# Patient Record
Sex: MALE | Race: WHITE | Employment: OTHER | URBAN - METROPOLITAN AREA
[De-identification: names, ages, dates, MRNs, and addresses within clinical notes are randomized per-mention and may not be internally consistent; named-entity substitution may affect disease eponyms.]

---

## 2017-11-01 ENCOUNTER — HOSPITAL ENCOUNTER (OUTPATIENT)
Dept: OTHER | Age: 77
Discharge: HOME | End: 2017-11-30
Attending: INTERNAL MEDICINE | Admitting: INTERNAL MEDICINE

## 2017-11-15 PROBLEM — I50.20 SYSTOLIC CHF (HCC): Status: ACTIVE | Noted: 2017-11-15

## 2017-11-15 PROBLEM — E11.9 DM2 (DIABETES MELLITUS, TYPE 2) (HCC): Status: ACTIVE | Noted: 2017-11-15

## 2017-12-01 ENCOUNTER — HOSPITAL ENCOUNTER (OUTPATIENT)
Dept: OTHER | Age: 77
Discharge: HOME | End: 2017-12-31
Attending: INTERNAL MEDICINE | Admitting: INTERNAL MEDICINE

## 2017-12-01 ENCOUNTER — HOSPITAL ENCOUNTER (OUTPATIENT)
Dept: OTHER | Age: 77
Discharge: HOME | End: 2017-12-04
Attending: INTERNAL MEDICINE | Admitting: INTERNAL MEDICINE

## 2017-12-06 ENCOUNTER — OFFICE VISIT (OUTPATIENT)
Dept: CARDIOLOGY CLINIC | Age: 77
End: 2017-12-06

## 2017-12-06 VITALS
HEART RATE: 72 BPM | SYSTOLIC BLOOD PRESSURE: 120 MMHG | DIASTOLIC BLOOD PRESSURE: 72 MMHG | BODY MASS INDEX: 32.08 KG/M2 | WEIGHT: 250 LBS | HEIGHT: 74 IN | OXYGEN SATURATION: 94 %

## 2017-12-06 DIAGNOSIS — I10 ESSENTIAL HYPERTENSION: ICD-10-CM

## 2017-12-06 DIAGNOSIS — I25.118 CORONARY ARTERY DISEASE OF NATIVE ARTERY OF NATIVE HEART WITH STABLE ANGINA PECTORIS (HCC): Primary | ICD-10-CM

## 2017-12-06 DIAGNOSIS — I25.5 CARDIOMYOPATHY, ISCHEMIC: ICD-10-CM

## 2017-12-06 DIAGNOSIS — I50.22 CHRONIC SYSTOLIC CONGESTIVE HEART FAILURE (HCC): ICD-10-CM

## 2017-12-06 PROCEDURE — 1111F DSCHRG MED/CURRENT MED MERGE: CPT | Performed by: NURSE PRACTITIONER

## 2017-12-06 PROCEDURE — G8598 ASA/ANTIPLAT THER USED: HCPCS | Performed by: NURSE PRACTITIONER

## 2017-12-06 PROCEDURE — G8484 FLU IMMUNIZE NO ADMIN: HCPCS | Performed by: NURSE PRACTITIONER

## 2017-12-06 PROCEDURE — 4040F PNEUMOC VAC/ADMIN/RCVD: CPT | Performed by: NURSE PRACTITIONER

## 2017-12-06 PROCEDURE — 1036F TOBACCO NON-USER: CPT | Performed by: NURSE PRACTITIONER

## 2017-12-06 PROCEDURE — G8417 CALC BMI ABV UP PARAM F/U: HCPCS | Performed by: NURSE PRACTITIONER

## 2017-12-06 PROCEDURE — 3017F COLORECTAL CA SCREEN DOC REV: CPT | Performed by: NURSE PRACTITIONER

## 2017-12-06 PROCEDURE — 1123F ACP DISCUSS/DSCN MKR DOCD: CPT | Performed by: NURSE PRACTITIONER

## 2017-12-06 PROCEDURE — G8427 DOCREV CUR MEDS BY ELIG CLIN: HCPCS | Performed by: NURSE PRACTITIONER

## 2017-12-06 PROCEDURE — 99213 OFFICE O/P EST LOW 20 MIN: CPT | Performed by: NURSE PRACTITIONER

## 2017-12-06 RX ORDER — ATORVASTATIN CALCIUM 40 MG/1
40 TABLET, FILM COATED ORAL DAILY
Qty: 90 TABLET | Refills: 0 | Status: CANCELLED | OUTPATIENT
Start: 2017-12-06

## 2017-12-06 NOTE — PATIENT INSTRUCTIONS
1. No change in heart medicines  2. Follow up with Micha and the echocardiogram later this month  3. Follow up with Dr. Compa Vick in January  4. Consider a lower dose of Ranexa if have more chest pain instead of increasing amlodipine  5.  Okay to resume cardiac rehab phase III

## 2017-12-06 NOTE — PROGRESS NOTES
Matt Jacob   Cardiac Evaluation    Primary Care Doctor:  Kaleb Leslie MD    Chief Complaint   Patient presents with    Follow-Up from Hospital     chronic angina 11/15-11/16/2017, ekg 11/17/2017    Congestive Heart Failure    Results     cxr 11/15/2017    Discuss Labs     bmp, bnp & cbc 11/15/2017, lipids 07/03/2016    Fatigue     depending on the day        History of Present Illness:   I had the pleasure of seeing Stephon Doss in follow up for recent hospitalization. Stephon Doss presented with chest pain that was felt to be his chronic stable angina. Troponin's were mildly elevated but no rise or fall consistent with ACS. His symptoms improved with addition of calcium channel blocker for angina. He has a known hx of CAD with multiple prior interventions, followed by Dr. Cortez Harrington, as well as cardiomyopathy (ischemic vs hypertension). He remains on ARB, EB BB, ASA, Plavix, statin. Today he reports significant improvement in angina with the amlodipine. He also feels this may be on the \"brink\" of needing more. He wants to return to cardiac rehab but phase 3 maintenance plan. He admits to not being very active since hospitalization. He denies any shortness of breath, no orthopnea or PND. Appetite is good though is overall decreased over past year. His weight is down about 20 lbs in past several months. His insurance sent him a scale for telemonitoring. Stephon Doss describes symptoms including fatigue but denies chest pain, dyspnea, palpitations, orthopnea, PND, early saiety, edema, syncope. Past Medical History:   has a past medical history of Acute MI; Cancer ([address] CHF (congestive heart failure) ([address] Diabetes mellitus ([address] GERD (gastroesophageal reflux disease); Hyperlipidemia; Hypertension; and Neuropathy ([address] Surgical History:   has a past surgical history that includes Splenectomy (2002); Cholecystectomy; Appendectomy; Pilonidal cyst excision;  Tunneled venous port placement; Colonoscopy; Colonoscopy (10/7/13); and Coronary angioplasty with stent. Social History:   reports that he quit smoking about 43 years ago. He has never used smokeless tobacco. He reports that he does not drink alcohol or use drugs. Family History:   History reviewed. No pertinent family history. Home Medications:  Prior to Admission medications    Medication Sig Start Date End Date Taking? Authorizing Provider   amLODIPine (NORVASC) 2.5 MG tablet Take 1 tablet by mouth daily 11/16/17  Yes Lucía Bryant MD   furosemide (LASIX) 40 MG tablet Take 40 mg by mouth daily   Yes Historical Provider, MD   omeprazole (PRILOSEC) 20 MG delayed release capsule Take 20 mg by mouth Daily   Yes Historical Provider, MD   isosorbide mononitrate (IMDUR) 60 MG CR tablet Take 1 tablet by mouth daily 7/5/16  Yes Lucía Bryant MD   nitroGLYCERIN (NITROSTAT) 0.4 MG SL tablet Place 1 tablet under the tongue every 5 minutes as needed for Chest pain 7/5/16  Yes Lucía Bryant MD   clopidogrel (PLAVIX) 75 MG tablet Take 1 tablet by mouth daily 7/3/16  Yes Lucía Bryant MD   carvedilol (COREG) 6.25 MG tablet Take 6.25 mg by mouth 2 times daily (with meals). Yes Historical Provider, MD   citalopram (CELEXA) 40 MG tablet Take 40 mg by mouth daily. Yes Historical Provider, MD   pravastatin (PRAVACHOL) 40 MG tablet Take 40 mg by mouth daily. Yes Historical Provider, MD   losartan (COZAAR) 25 MG tablet Take 25 mg by mouth daily. Yes Historical Provider, MD   aspirin 81 MG tablet Take 81 mg by mouth daily    Yes Historical Provider, MD   metFORMIN (GLUCOPHAGE) 1000 MG tablet Take 1,000 mg by mouth 2 times daily (with meals). Yes Historical Provider, MD        Allergies:  Kiwi extract; Adhesive tape; Diclofenac sodium; Lipitor; Lisinopril; and Lorazepam     Review of Systems:   · Constitutional: there has been no unanticipated weight loss.      · Eyes: No vision changes  · ENT: No Headaches, no nasal congestion. No mouth sores or sore throat. · Cardiovascular: Reviewed in HPI  · Respiratory: No cough or wheezing, no sputum production. · Gastrointestinal: No abdominal pain, no constipation or diarrhea  · Genitourinary: No dysuria, trouble voiding, or hematuria. · Musculoskeletal:  No weakness or joint complaints. · Integumentary: No rash or pruritis. · Neurological: No numbness or tingling. No weakness. No tremor. · Psychiatric: No anxiety, no depression. · Endocrine:  No excessive thirst or urination. · Hematologic/Lymphatic: No abnormal bruising or bleeding, blood clots or swollen lymph nodes. Physical Examination:    Vitals:    12/06/17 1509   BP: 120/72   Pulse: 72   SpO2: 94%   Weight: 250 lb (113.4 kg)   Height: 6' 2\" (1.88 m)        Constitutional and General Appearance: Warm and dry, no apparent distress, normal coloration  HEENT:  Normocephalic, atraumatic  Respiratory:  · Normal excursion and expansion without use of accessory muscles  · Resp Auscultation: Normal breath sounds without dullness  Cardiovascular:  · The apical impulses not displaced  · Heart tones are crisp and normal  · JVP 8 cm H2O  · Regular rate and rhythm, normal S1S2, no m/g/r/c  · Peripheral pulses are symmetrical and full  · There is no clubbing, cyanosis of the extremities.   · No edema  · Pedal Pulses: 2+ and equal     Abdomen:  · No masses or tenderness  · Liver/Spleen: No Abnormalities Noted  Neurological/Psychiatric:  · Alert and oriented in all spheres  · Moves all extremities well  · Exhibits normal gait balance and coordination  · No abnormalities of mood, affect, memory, mentation, or behavior are noted    Lab Data:  CBC:   Lab Results   Component Value Date    WBC 11.1 11/15/2017    WBC 12.7 07/05/2016    WBC 10.0 07/03/2016    RBC 4.31 11/15/2017    RBC 4.30 07/05/2016    RBC 4.29 07/03/2016    HGB 13.0 11/15/2017    HGB 12.8 07/05/2016    HGB 12.7 07/03/2016    HCT 39.6 11/15/2017    HCT 39.4 07/05/2016

## 2017-12-06 NOTE — LETTER
[address]  [address] [facility] [address]  Phone: [phone]  Fax: [phone]    Jose Mcnair NP        December 6, 2017     Patient: Jovan Alvarez   YOB: 1940   Date of Visit: 12/6/2017       To Whom It May Concern: It is my medical opinion that Rebecca Beltrán may return to participation on 12/11/17 to phase III cardiac rehabilitation. 1. Coronary artery disease of native artery of native heart with stable angina pectoris ([address]    2. Chronic systolic congestive heart failure (HCC)    3. Cardiomyopathy, ischemic    4. Essential hypertension         If you have any questions or concerns, please don't hesitate to call.     Sincerely,        Jose Mcnair NP

## 2018-01-01 ENCOUNTER — HOSPITAL ENCOUNTER (OUTPATIENT)
Dept: OTHER | Age: 78
Discharge: HOME | End: 2018-01-31
Attending: INTERNAL MEDICINE | Admitting: INTERNAL MEDICINE

## 2018-02-01 ENCOUNTER — HOSPITAL ENCOUNTER (OUTPATIENT)
Dept: OTHER | Age: 78
Discharge: HOME | End: 2018-02-28
Attending: INTERNAL MEDICINE | Admitting: INTERNAL MEDICINE

## 2018-03-01 ENCOUNTER — HOSPITAL ENCOUNTER (OUTPATIENT)
Dept: OTHER | Age: 78
Discharge: HOME | End: 2018-03-31
Attending: INTERNAL MEDICINE | Admitting: INTERNAL MEDICINE

## 2018-04-01 ENCOUNTER — HOSPITAL ENCOUNTER (OUTPATIENT)
Dept: OTHER | Age: 78
Discharge: HOME | End: 2018-04-30
Attending: INTERNAL MEDICINE | Admitting: INTERNAL MEDICINE

## 2018-05-01 ENCOUNTER — HOSPITAL ENCOUNTER (OUTPATIENT)
Dept: OTHER | Age: 78
Discharge: HOME | End: 2018-05-31
Attending: INTERNAL MEDICINE | Admitting: INTERNAL MEDICINE

## 2018-06-01 ENCOUNTER — HOSPITAL ENCOUNTER (OUTPATIENT)
Dept: OTHER | Age: 78
Discharge: HOME | End: 2018-06-30
Attending: INTERNAL MEDICINE | Admitting: INTERNAL MEDICINE

## 2018-07-01 ENCOUNTER — HOSPITAL ENCOUNTER (OUTPATIENT)
Dept: OTHER | Age: 78
Discharge: HOME | End: 2018-07-01
Attending: INTERNAL MEDICINE | Admitting: INTERNAL MEDICINE

## 2018-08-03 ENCOUNTER — HOSPITAL ENCOUNTER (OUTPATIENT)
Age: 78
Discharge: HOME | End: 2018-08-03

## 2018-09-10 ENCOUNTER — HOSPITAL ENCOUNTER (OUTPATIENT)
Age: 78
Discharge: HOME | End: 2018-09-10
Payer: MEDICARE

## 2018-09-10 PROCEDURE — 9900000038 HC CARDIAC REHAB PHASE 3 - MONTHLY

## 2018-10-01 ENCOUNTER — HOSPITAL ENCOUNTER (OUTPATIENT)
Age: 78
Discharge: HOME | End: 2018-10-01
Payer: MEDICARE

## 2018-10-01 PROCEDURE — 9900000038 HC CARDIAC REHAB PHASE 3 - MONTHLY

## 2018-11-05 ENCOUNTER — HOSPITAL ENCOUNTER (OUTPATIENT)
Age: 78
Setting detail: THERAPIES SERIES
Discharge: HOME | End: 2018-11-05

## 2018-11-05 PROCEDURE — 9900000038 HC CARDIAC REHAB PHASE 3 - MONTHLY

## 2018-12-03 ENCOUNTER — HOSPITAL ENCOUNTER (OUTPATIENT)
Age: 78
Discharge: HOME | End: 2018-12-03
Payer: MEDICARE

## 2018-12-03 PROCEDURE — 9900000038 HC CARDIAC REHAB PHASE 3 - MONTHLY

## 2019-01-28 ENCOUNTER — TELEPHONE (OUTPATIENT)
Dept: PULMONOLOGY | Age: 79
End: 2019-01-28

## 2019-01-28 NOTE — TELEPHONE ENCOUNTER
Pt's wife states that these locations are too far from their home. They are going to check with his primary provider to see if there is somewhere closer to their home.

## 2019-02-15 ENCOUNTER — HOSPITAL ENCOUNTER (OUTPATIENT)
Age: 79
Discharge: HOME | End: 2019-02-15
Payer: MEDICARE

## 2019-02-15 PROCEDURE — 9900000038 HC CARDIAC REHAB PHASE 3 - MONTHLY

## 2019-03-13 ENCOUNTER — HOSPITAL ENCOUNTER (OUTPATIENT)
Age: 79
Discharge: HOME | End: 2019-03-13
Payer: MEDICARE

## 2019-03-13 PROCEDURE — 9900000038 HC CARDIAC REHAB PHASE 3 - MONTHLY

## 2019-07-01 PROCEDURE — 9900000038 HC CARDIAC REHAB PHASE 3 - MONTHLY

## 2019-07-29 ENCOUNTER — HOSPITAL ENCOUNTER (OUTPATIENT)
Dept: CARDIAC REHAB | Age: 79
Discharge: HOME | End: 2019-07-29

## 2019-08-07 ENCOUNTER — ANESTHESIA EVENT (OUTPATIENT)
Dept: OPERATING ROOM | Age: 79
End: 2019-08-07
Payer: MEDICARE

## 2019-08-07 ASSESSMENT — LIFESTYLE VARIABLES: SMOKING_STATUS: 0

## 2019-08-08 ENCOUNTER — ANESTHESIA (OUTPATIENT)
Dept: OPERATING ROOM | Age: 79
End: 2019-08-08
Payer: MEDICARE

## 2019-08-08 ENCOUNTER — HOSPITAL ENCOUNTER (OUTPATIENT)
Age: 79
Setting detail: OUTPATIENT SURGERY
Discharge: HOME | End: 2019-08-08
Attending: OPHTHALMOLOGY | Admitting: OPHTHALMOLOGY
Payer: MEDICARE

## 2019-08-08 VITALS — SYSTOLIC BLOOD PRESSURE: 106 MMHG | OXYGEN SATURATION: 99 % | DIASTOLIC BLOOD PRESSURE: 65 MMHG

## 2019-08-08 VITALS
HEIGHT: 74 IN | HEART RATE: 71 BPM | DIASTOLIC BLOOD PRESSURE: 83 MMHG | OXYGEN SATURATION: 99 % | BODY MASS INDEX: 32.08 KG/M2 | WEIGHT: 250 LBS | TEMPERATURE: 97.1 F | SYSTOLIC BLOOD PRESSURE: 143 MMHG | RESPIRATION RATE: 17 BRPM

## 2019-08-08 LAB
EKG ATRIAL RATE: 65 BPM
EKG DIAGNOSIS: NORMAL
EKG P AXIS: 90 DEGREES
EKG P-R INTERVAL: 176 MS
EKG Q-T INTERVAL: 504 MS
EKG QRS DURATION: 150 MS
EKG QTC CALCULATION (BAZETT): 524 MS
EKG R AXIS: 258 DEGREES
EKG T AXIS: 79 DEGREES
EKG VENTRICULAR RATE: 65 BPM
GLUCOSE BLD-MCNC: 116 MG/DL (ref 70–99)
GLUCOSE BLD-MCNC: 126 MG/DL (ref 70–99)
PERFORMED ON: ABNORMAL
PERFORMED ON: ABNORMAL

## 2019-08-08 PROCEDURE — 6370000000 HC RX 637 (ALT 250 FOR IP): Performed by: OPHTHALMOLOGY

## 2019-08-08 PROCEDURE — 3700000001 HC ADD 15 MINUTES (ANESTHESIA): Performed by: OPHTHALMOLOGY

## 2019-08-08 PROCEDURE — 2500000003 HC RX 250 WO HCPCS: Performed by: ANESTHESIOLOGY

## 2019-08-08 PROCEDURE — 93005 ELECTROCARDIOGRAM TRACING: CPT | Performed by: ANESTHESIOLOGY

## 2019-08-08 PROCEDURE — 2500000003 HC RX 250 WO HCPCS: Performed by: OPHTHALMOLOGY

## 2019-08-08 PROCEDURE — 6360000002 HC RX W HCPCS: Performed by: NURSE ANESTHETIST, CERTIFIED REGISTERED

## 2019-08-08 PROCEDURE — 93010 ELECTROCARDIOGRAM REPORT: CPT | Performed by: INTERNAL MEDICINE

## 2019-08-08 PROCEDURE — 7100000001 HC PACU RECOVERY - ADDTL 15 MIN: Performed by: OPHTHALMOLOGY

## 2019-08-08 PROCEDURE — 2709999900 HC NON-CHARGEABLE SUPPLY: Performed by: OPHTHALMOLOGY

## 2019-08-08 PROCEDURE — 2500000003 HC RX 250 WO HCPCS: Performed by: NURSE ANESTHETIST, CERTIFIED REGISTERED

## 2019-08-08 PROCEDURE — 3700000000 HC ANESTHESIA ATTENDED CARE: Performed by: OPHTHALMOLOGY

## 2019-08-08 PROCEDURE — 2580000003 HC RX 258: Performed by: ANESTHESIOLOGY

## 2019-08-08 PROCEDURE — 7100000011 HC PHASE II RECOVERY - ADDTL 15 MIN: Performed by: OPHTHALMOLOGY

## 2019-08-08 PROCEDURE — 3600000013 HC SURGERY LEVEL 3 ADDTL 15MIN: Performed by: OPHTHALMOLOGY

## 2019-08-08 PROCEDURE — V2632 POST CHMBR INTRAOCULAR LENS: HCPCS | Performed by: OPHTHALMOLOGY

## 2019-08-08 PROCEDURE — 3600000003 HC SURGERY LEVEL 3 BASE: Performed by: OPHTHALMOLOGY

## 2019-08-08 PROCEDURE — 7100000000 HC PACU RECOVERY - FIRST 15 MIN: Performed by: OPHTHALMOLOGY

## 2019-08-08 PROCEDURE — 6360000002 HC RX W HCPCS: Performed by: OPHTHALMOLOGY

## 2019-08-08 PROCEDURE — 7100000010 HC PHASE II RECOVERY - FIRST 15 MIN: Performed by: OPHTHALMOLOGY

## 2019-08-08 DEVICE — ACRYSOF(R) SINGLE-PIECE ACRYLIC FOLDABLE IOL,UV-ABSORBING POSTERIOR CHAMBER LENS (IOL/PC),13.0MM LENGTH, 6.0MM BICONVEX OPTIC, PLANARHAPTICS.
Type: IMPLANTABLE DEVICE | Site: EYE | Status: FUNCTIONAL
Brand: ACRYSOF®

## 2019-08-08 RX ORDER — MORPHINE SULFATE 10 MG/ML
1 INJECTION, SOLUTION INTRAMUSCULAR; INTRAVENOUS EVERY 5 MIN PRN
Status: DISCONTINUED | OUTPATIENT
Start: 2019-08-08 | End: 2019-08-08 | Stop reason: HOSPADM

## 2019-08-08 RX ORDER — OXYCODONE HYDROCHLORIDE AND ACETAMINOPHEN 5; 325 MG/1; MG/1
2 TABLET ORAL PRN
Status: DISCONTINUED | OUTPATIENT
Start: 2019-08-08 | End: 2019-08-08 | Stop reason: HOSPADM

## 2019-08-08 RX ORDER — SODIUM CHLORIDE 0.9 % (FLUSH) 0.9 %
10 SYRINGE (ML) INJECTION PRN
Status: DISCONTINUED | OUTPATIENT
Start: 2019-08-08 | End: 2019-08-08 | Stop reason: HOSPADM

## 2019-08-08 RX ORDER — OXYCODONE HYDROCHLORIDE AND ACETAMINOPHEN 5; 325 MG/1; MG/1
1 TABLET ORAL PRN
Status: DISCONTINUED | OUTPATIENT
Start: 2019-08-08 | End: 2019-08-08 | Stop reason: HOSPADM

## 2019-08-08 RX ORDER — PROPOFOL 10 MG/ML
INJECTION, EMULSION INTRAVENOUS PRN
Status: DISCONTINUED | OUTPATIENT
Start: 2019-08-08 | End: 2019-08-08 | Stop reason: SDUPTHER

## 2019-08-08 RX ORDER — SODIUM CHLORIDE, POTASSIUM CHLORIDE, CALCIUM CHLORIDE, MAGNESIUM CHLORIDE, SODIUM ACETATE, AND SODIUM CITRATE 6.4; .75; .48; .3; 3.9; 1.7 MG/ML; MG/ML; MG/ML; MG/ML; MG/ML; MG/ML
SOLUTION IRRIGATION PRN
Status: DISCONTINUED | OUTPATIENT
Start: 2019-08-08 | End: 2019-08-08 | Stop reason: ALTCHOICE

## 2019-08-08 RX ORDER — FENTANYL CITRATE 50 UG/ML
50 INJECTION, SOLUTION INTRAMUSCULAR; INTRAVENOUS EVERY 5 MIN PRN
Status: DISCONTINUED | OUTPATIENT
Start: 2019-08-08 | End: 2019-08-08 | Stop reason: HOSPADM

## 2019-08-08 RX ORDER — PREDNISOLONE ACETATE 10 MG/ML
SUSPENSION/ DROPS OPHTHALMIC PRN
Status: DISCONTINUED | OUTPATIENT
Start: 2019-08-08 | End: 2019-08-08 | Stop reason: ALTCHOICE

## 2019-08-08 RX ORDER — SODIUM CHLORIDE, SODIUM LACTATE, POTASSIUM CHLORIDE, CALCIUM CHLORIDE 600; 310; 30; 20 MG/100ML; MG/100ML; MG/100ML; MG/100ML
INJECTION, SOLUTION INTRAVENOUS CONTINUOUS
Status: DISCONTINUED | OUTPATIENT
Start: 2019-08-08 | End: 2019-08-08 | Stop reason: HOSPADM

## 2019-08-08 RX ORDER — ONDANSETRON 2 MG/ML
4 INJECTION INTRAMUSCULAR; INTRAVENOUS
Status: DISCONTINUED | OUTPATIENT
Start: 2019-08-08 | End: 2019-08-08 | Stop reason: HOSPADM

## 2019-08-08 RX ORDER — EPHEDRINE SULFATE 50 MG/ML
INJECTION INTRAVENOUS PRN
Status: DISCONTINUED | OUTPATIENT
Start: 2019-08-08 | End: 2019-08-08 | Stop reason: SDUPTHER

## 2019-08-08 RX ORDER — TOBRAMYCIN AND DEXAMETHASONE 3; 1 MG/ML; MG/ML
SUSPENSION/ DROPS OPHTHALMIC PRN
Status: DISCONTINUED | OUTPATIENT
Start: 2019-08-08 | End: 2019-08-08 | Stop reason: ALTCHOICE

## 2019-08-08 RX ORDER — LIDOCAINE HYDROCHLORIDE 10 MG/ML
0.3 INJECTION, SOLUTION EPIDURAL; INFILTRATION; INTRACAUDAL; PERINEURAL
Status: COMPLETED | OUTPATIENT
Start: 2019-08-08 | End: 2019-08-08

## 2019-08-08 RX ORDER — SODIUM CHLORIDE 0.9 % (FLUSH) 0.9 %
10 SYRINGE (ML) INJECTION EVERY 12 HOURS SCHEDULED
Status: DISCONTINUED | OUTPATIENT
Start: 2019-08-08 | End: 2019-08-08 | Stop reason: HOSPADM

## 2019-08-08 RX ORDER — PREDNISOLONE ACETATE 10 MG/ML
1 SUSPENSION/ DROPS OPHTHALMIC SEE ADMIN INSTRUCTIONS
Status: DISCONTINUED | OUTPATIENT
Start: 2019-08-08 | End: 2019-08-08 | Stop reason: HOSPADM

## 2019-08-08 RX ORDER — FENTANYL CITRATE 50 UG/ML
25 INJECTION, SOLUTION INTRAMUSCULAR; INTRAVENOUS EVERY 5 MIN PRN
Status: DISCONTINUED | OUTPATIENT
Start: 2019-08-08 | End: 2019-08-08 | Stop reason: HOSPADM

## 2019-08-08 RX ORDER — TETRACAINE HYDROCHLORIDE 5 MG/ML
SOLUTION OPHTHALMIC PRN
Status: DISCONTINUED | OUTPATIENT
Start: 2019-08-08 | End: 2019-08-08 | Stop reason: ALTCHOICE

## 2019-08-08 RX ORDER — MORPHINE SULFATE 10 MG/ML
2 INJECTION, SOLUTION INTRAMUSCULAR; INTRAVENOUS EVERY 5 MIN PRN
Status: DISCONTINUED | OUTPATIENT
Start: 2019-08-08 | End: 2019-08-08 | Stop reason: HOSPADM

## 2019-08-08 RX ORDER — PILOCARPINE HYDROCHLORIDE 20 MG/ML
SOLUTION/ DROPS OPHTHALMIC PRN
Status: DISCONTINUED | OUTPATIENT
Start: 2019-08-08 | End: 2019-08-08 | Stop reason: ALTCHOICE

## 2019-08-08 RX ORDER — TOBRAMYCIN AND DEXAMETHASONE 3; 1 MG/ML; MG/ML
1 SUSPENSION/ DROPS OPHTHALMIC SEE ADMIN INSTRUCTIONS
Status: DISCONTINUED | OUTPATIENT
Start: 2019-08-08 | End: 2019-08-08 | Stop reason: HOSPADM

## 2019-08-08 RX ADMIN — Medication 0.3 ML: at 09:41

## 2019-08-08 RX ADMIN — Medication 0.3 ML: at 09:30

## 2019-08-08 RX ADMIN — SODIUM CHLORIDE, POTASSIUM CHLORIDE, SODIUM LACTATE AND CALCIUM CHLORIDE: 600; 310; 30; 20 INJECTION, SOLUTION INTRAVENOUS at 09:46

## 2019-08-08 RX ADMIN — EPHEDRINE SULFATE 15 MG: 50 INJECTION INTRAVENOUS at 10:49

## 2019-08-08 RX ADMIN — Medication 0.3 ML: at 09:45

## 2019-08-08 RX ADMIN — BROMFENAC SODIUM 1 DROP: 0.7 SOLUTION/ DROPS OPHTHALMIC at 09:41

## 2019-08-08 RX ADMIN — LIDOCAINE HYDROCHLORIDE 0.3 ML: 10 INJECTION, SOLUTION EPIDURAL; INFILTRATION; INTRACAUDAL; PERINEURAL at 09:46

## 2019-08-08 RX ADMIN — PROPOFOL 150 MG: 10 INJECTION, EMULSION INTRAVENOUS at 10:30

## 2019-08-08 ASSESSMENT — PULMONARY FUNCTION TESTS
PIF_VALUE: 11
PIF_VALUE: 1
PIF_VALUE: 11
PIF_VALUE: 0
PIF_VALUE: 11
PIF_VALUE: 1
PIF_VALUE: 13
PIF_VALUE: 0
PIF_VALUE: 11
PIF_VALUE: 11
PIF_VALUE: 0
PIF_VALUE: 5
PIF_VALUE: 3
PIF_VALUE: 2
PIF_VALUE: 11
PIF_VALUE: 0
PIF_VALUE: 18
PIF_VALUE: 11
PIF_VALUE: 20
PIF_VALUE: 22
PIF_VALUE: 11
PIF_VALUE: 0

## 2019-08-08 ASSESSMENT — PAIN - FUNCTIONAL ASSESSMENT: PAIN_FUNCTIONAL_ASSESSMENT: 0-10

## 2019-08-08 ASSESSMENT — ENCOUNTER SYMPTOMS: SHORTNESS OF BREATH: 0

## 2019-08-08 ASSESSMENT — PAIN SCALES - GENERAL
PAINLEVEL_OUTOF10: 0
PAINLEVEL_OUTOF10: 0

## 2019-08-08 NOTE — ANESTHESIA POSTPROCEDURE EVALUATION
Department of Anesthesiology  Postprocedure Note    Patient: Marcela Allen  MRN: [id number]  YOB: 1940  Date of evaluation: 8/8/2019  Time:  11:49 AM     Procedure Summary     Date:  08/08/19 Room / Location:  [place] [address] / [facility] OR    Anesthesia Start:  1027 Anesthesia Stop:  1107    Procedure:  PHACO EMULSIFICATION OF CATARACT WITH INTRAOCULAR LENS IMPLANT EYE (Left Eye) Diagnosis:  (CATARACT LEFT EYE)    Surgeon:  Caleb Downing MD Responsible Provider:  Soni Wallace MD    Anesthesia Type:  general ASA Status:  3          Anesthesia Type: general    Yasmine Phase I: Yasmine Score: 10    Yasmine Phase II: Yasmine Score: 10    Last vitals: Reviewed and per EMR flowsheets.    Vitals:    08/08/19 1112 08/08/19 1116 08/08/19 1121 08/08/19 1135   BP: (!) 139/96 (!) 140/87 (!) 140/81 (!) 147/85   Pulse: 74 75 72 71   Resp: 14 21 14 18   Temp:   97 °F (36.1 °C) 97.8 °F (36.6 °C)   TempSrc:   Temporal Temporal   SpO2: 96% 96% 96% 97%   Weight:       Height:           Anesthesia Post Evaluation    Patient location during evaluation: bedside  Patient participation: complete - patient participated  Level of consciousness: awake and alert  Airway patency: patent  Nausea & Vomiting: no nausea  Complications: no  Cardiovascular status: hemodynamically stable  Respiratory status: acceptable  Hydration status: euvolemic

## 2019-08-08 NOTE — PROGRESS NOTES
Veronica Downing in to see patient in post-op wants eye drops started at 1:00 PM and may have a drop prior to discharge. Shaan Samson

## 2019-09-03 ENCOUNTER — ANESTHESIA EVENT (OUTPATIENT)
Dept: OPERATING ROOM | Age: 79
End: 2019-09-03
Payer: MEDICARE

## 2019-09-05 ENCOUNTER — ANESTHESIA (OUTPATIENT)
Dept: OPERATING ROOM | Age: 79
End: 2019-09-05
Payer: MEDICARE

## 2019-09-05 ENCOUNTER — HOSPITAL ENCOUNTER (OUTPATIENT)
Age: 79
Setting detail: OUTPATIENT SURGERY
Discharge: HOME | End: 2019-09-05
Attending: OPHTHALMOLOGY | Admitting: OPHTHALMOLOGY
Payer: MEDICARE

## 2019-09-05 VITALS
BODY MASS INDEX: 32.08 KG/M2 | WEIGHT: 250 LBS | HEART RATE: 72 BPM | RESPIRATION RATE: 22 BRPM | TEMPERATURE: 97.5 F | DIASTOLIC BLOOD PRESSURE: 73 MMHG | HEIGHT: 74 IN | SYSTOLIC BLOOD PRESSURE: 121 MMHG | OXYGEN SATURATION: 94 %

## 2019-09-05 VITALS
DIASTOLIC BLOOD PRESSURE: 63 MMHG | SYSTOLIC BLOOD PRESSURE: 97 MMHG | OXYGEN SATURATION: 99 % | RESPIRATION RATE: 15 BRPM

## 2019-09-05 LAB
GLUCOSE BLD-MCNC: 123 MG/DL (ref 70–99)
GLUCOSE BLD-MCNC: 129 MG/DL (ref 70–99)
PERFORMED ON: ABNORMAL
PERFORMED ON: ABNORMAL

## 2019-09-05 PROCEDURE — 3600000013 HC SURGERY LEVEL 3 ADDTL 15MIN: Performed by: OPHTHALMOLOGY

## 2019-09-05 PROCEDURE — 2500000003 HC RX 250 WO HCPCS: Performed by: ANESTHESIOLOGY

## 2019-09-05 PROCEDURE — 3600000003 HC SURGERY LEVEL 3 BASE: Performed by: OPHTHALMOLOGY

## 2019-09-05 PROCEDURE — 2580000003 HC RX 258: Performed by: ANESTHESIOLOGY

## 2019-09-05 PROCEDURE — 6360000002 HC RX W HCPCS: Performed by: NURSE ANESTHETIST, CERTIFIED REGISTERED

## 2019-09-05 PROCEDURE — 7100000011 HC PHASE II RECOVERY - ADDTL 15 MIN: Performed by: OPHTHALMOLOGY

## 2019-09-05 PROCEDURE — 3700000000 HC ANESTHESIA ATTENDED CARE: Performed by: OPHTHALMOLOGY

## 2019-09-05 PROCEDURE — 2709999900 HC NON-CHARGEABLE SUPPLY: Performed by: OPHTHALMOLOGY

## 2019-09-05 PROCEDURE — 6370000000 HC RX 637 (ALT 250 FOR IP): Performed by: OPHTHALMOLOGY

## 2019-09-05 PROCEDURE — 7100000010 HC PHASE II RECOVERY - FIRST 15 MIN: Performed by: OPHTHALMOLOGY

## 2019-09-05 PROCEDURE — 6360000002 HC RX W HCPCS: Performed by: OPHTHALMOLOGY

## 2019-09-05 PROCEDURE — 7100000000 HC PACU RECOVERY - FIRST 15 MIN: Performed by: OPHTHALMOLOGY

## 2019-09-05 PROCEDURE — 2500000003 HC RX 250 WO HCPCS: Performed by: NURSE ANESTHETIST, CERTIFIED REGISTERED

## 2019-09-05 PROCEDURE — V2632 POST CHMBR INTRAOCULAR LENS: HCPCS | Performed by: OPHTHALMOLOGY

## 2019-09-05 PROCEDURE — 3700000001 HC ADD 15 MINUTES (ANESTHESIA): Performed by: OPHTHALMOLOGY

## 2019-09-05 PROCEDURE — 2500000003 HC RX 250 WO HCPCS: Performed by: OPHTHALMOLOGY

## 2019-09-05 PROCEDURE — 7100000001 HC PACU RECOVERY - ADDTL 15 MIN: Performed by: OPHTHALMOLOGY

## 2019-09-05 DEVICE — ACRYSOF(R) SINGLE-PIECE ACRYLIC FOLDABLE IOL,UV-ABSORBING POSTERIOR CHAMBER LENS (IOL/PC),13.0MM LENGTH, 6.0MM BICONVEX OPTIC, PLANARHAPTICS.
Type: IMPLANTABLE DEVICE | Site: EYE | Status: FUNCTIONAL
Brand: ACRYSOF®

## 2019-09-05 RX ORDER — LIDOCAINE HYDROCHLORIDE 20 MG/ML
INJECTION, SOLUTION INFILTRATION; PERINEURAL PRN
Status: DISCONTINUED | OUTPATIENT
Start: 2019-09-05 | End: 2019-09-05 | Stop reason: SDUPTHER

## 2019-09-05 RX ORDER — TOBRAMYCIN AND DEXAMETHASONE 3; 1 MG/ML; MG/ML
SUSPENSION/ DROPS OPHTHALMIC PRN
Status: DISCONTINUED | OUTPATIENT
Start: 2019-09-05 | End: 2019-09-05 | Stop reason: ALTCHOICE

## 2019-09-05 RX ORDER — TETRACAINE HYDROCHLORIDE 5 MG/ML
SOLUTION OPHTHALMIC PRN
Status: DISCONTINUED | OUTPATIENT
Start: 2019-09-05 | End: 2019-09-05 | Stop reason: ALTCHOICE

## 2019-09-05 RX ORDER — SODIUM CHLORIDE 0.9 % (FLUSH) 0.9 %
10 SYRINGE (ML) INJECTION PRN
Status: DISCONTINUED | OUTPATIENT
Start: 2019-09-05 | End: 2019-09-05 | Stop reason: HOSPADM

## 2019-09-05 RX ORDER — LIDOCAINE HYDROCHLORIDE 10 MG/ML
0.3 INJECTION, SOLUTION EPIDURAL; INFILTRATION; INTRACAUDAL; PERINEURAL
Status: COMPLETED | OUTPATIENT
Start: 2019-09-05 | End: 2019-09-05

## 2019-09-05 RX ORDER — SODIUM CHLORIDE, SODIUM LACTATE, POTASSIUM CHLORIDE, CALCIUM CHLORIDE 600; 310; 30; 20 MG/100ML; MG/100ML; MG/100ML; MG/100ML
INJECTION, SOLUTION INTRAVENOUS CONTINUOUS
Status: DISCONTINUED | OUTPATIENT
Start: 2019-09-05 | End: 2019-09-05 | Stop reason: HOSPADM

## 2019-09-05 RX ORDER — SODIUM CHLORIDE 0.9 % (FLUSH) 0.9 %
10 SYRINGE (ML) INJECTION EVERY 12 HOURS SCHEDULED
Status: DISCONTINUED | OUTPATIENT
Start: 2019-09-05 | End: 2019-09-05 | Stop reason: HOSPADM

## 2019-09-05 RX ORDER — ETOMIDATE 2 MG/ML
INJECTION INTRAVENOUS PRN
Status: DISCONTINUED | OUTPATIENT
Start: 2019-09-05 | End: 2019-09-05 | Stop reason: SDUPTHER

## 2019-09-05 RX ORDER — PROPOFOL 10 MG/ML
INJECTION, EMULSION INTRAVENOUS PRN
Status: DISCONTINUED | OUTPATIENT
Start: 2019-09-05 | End: 2019-09-05 | Stop reason: SDUPTHER

## 2019-09-05 RX ORDER — ONDANSETRON 2 MG/ML
INJECTION INTRAMUSCULAR; INTRAVENOUS PRN
Status: DISCONTINUED | OUTPATIENT
Start: 2019-09-05 | End: 2019-09-05 | Stop reason: SDUPTHER

## 2019-09-05 RX ORDER — PILOCARPINE HYDROCHLORIDE 20 MG/ML
SOLUTION/ DROPS OPHTHALMIC PRN
Status: DISCONTINUED | OUTPATIENT
Start: 2019-09-05 | End: 2019-09-05 | Stop reason: ALTCHOICE

## 2019-09-05 RX ORDER — FENTANYL CITRATE 50 UG/ML
INJECTION, SOLUTION INTRAMUSCULAR; INTRAVENOUS PRN
Status: DISCONTINUED | OUTPATIENT
Start: 2019-09-05 | End: 2019-09-05 | Stop reason: SDUPTHER

## 2019-09-05 RX ORDER — SODIUM CHLORIDE, POTASSIUM CHLORIDE, CALCIUM CHLORIDE, MAGNESIUM CHLORIDE, SODIUM ACETATE, AND SODIUM CITRATE 6.4; .75; .48; .3; 3.9; 1.7 MG/ML; MG/ML; MG/ML; MG/ML; MG/ML; MG/ML
SOLUTION IRRIGATION PRN
Status: DISCONTINUED | OUTPATIENT
Start: 2019-09-05 | End: 2019-09-05 | Stop reason: ALTCHOICE

## 2019-09-05 RX ORDER — PREDNISOLONE ACETATE 10 MG/ML
SUSPENSION/ DROPS OPHTHALMIC PRN
Status: DISCONTINUED | OUTPATIENT
Start: 2019-09-05 | End: 2019-09-05 | Stop reason: ALTCHOICE

## 2019-09-05 RX ORDER — TOBRAMYCIN AND DEXAMETHASONE 3; 1 MG/ML; MG/ML
1 SUSPENSION/ DROPS OPHTHALMIC SEE ADMIN INSTRUCTIONS
Status: DISCONTINUED | OUTPATIENT
Start: 2019-09-05 | End: 2019-09-05 | Stop reason: HOSPADM

## 2019-09-05 RX ORDER — PREDNISOLONE ACETATE 10 MG/ML
1 SUSPENSION/ DROPS OPHTHALMIC SEE ADMIN INSTRUCTIONS
Status: DISCONTINUED | OUTPATIENT
Start: 2019-09-05 | End: 2019-09-05 | Stop reason: HOSPADM

## 2019-09-05 RX ADMIN — BROMFENAC SODIUM 1 DROP: 0.7 SOLUTION/ DROPS OPHTHALMIC at 07:12

## 2019-09-05 RX ADMIN — SODIUM CHLORIDE, POTASSIUM CHLORIDE, SODIUM LACTATE AND CALCIUM CHLORIDE: 600; 310; 30; 20 INJECTION, SOLUTION INTRAVENOUS at 08:31

## 2019-09-05 RX ADMIN — PROPOFOL 30 MG: 10 INJECTION, EMULSION INTRAVENOUS at 08:31

## 2019-09-05 RX ADMIN — LIDOCAINE HYDROCHLORIDE 0.1 ML: 10 INJECTION, SOLUTION EPIDURAL; INFILTRATION; INTRACAUDAL; PERINEURAL at 07:16

## 2019-09-05 RX ADMIN — Medication 0.3 ML: at 07:19

## 2019-09-05 RX ADMIN — Medication 0.3 ML: at 07:12

## 2019-09-05 RX ADMIN — FENTANYL CITRATE 25 MCG: 50 INJECTION INTRAMUSCULAR; INTRAVENOUS at 08:31

## 2019-09-05 RX ADMIN — ONDANSETRON 4 MG: 2 INJECTION, SOLUTION INTRAMUSCULAR; INTRAVENOUS at 08:37

## 2019-09-05 RX ADMIN — Medication 0.3 ML: at 07:44

## 2019-09-05 RX ADMIN — PHENYLEPHRINE HYDROCHLORIDE 100 MCG: 10 INJECTION INTRAVENOUS at 08:48

## 2019-09-05 RX ADMIN — TOBRAMYCIN AND DEXAMETHASONE 1 DROP: 3; 1 SUSPENSION/ DROPS OPHTHALMIC at 10:15

## 2019-09-05 RX ADMIN — SODIUM CHLORIDE, POTASSIUM CHLORIDE, SODIUM LACTATE AND CALCIUM CHLORIDE: 600; 310; 30; 20 INJECTION, SOLUTION INTRAVENOUS at 07:15

## 2019-09-05 RX ADMIN — LIDOCAINE HYDROCHLORIDE 40 MG: 20 INJECTION, SOLUTION INFILTRATION; PERINEURAL at 08:31

## 2019-09-05 RX ADMIN — ETOMIDATE 20 MG: 2 INJECTION, SOLUTION INTRAVENOUS at 08:31

## 2019-09-05 ASSESSMENT — PULMONARY FUNCTION TESTS
PIF_VALUE: 2
PIF_VALUE: 3
PIF_VALUE: 2
PIF_VALUE: 3
PIF_VALUE: 2
PIF_VALUE: 0
PIF_VALUE: 0
PIF_VALUE: 2
PIF_VALUE: 2
PIF_VALUE: 1
PIF_VALUE: 2
PIF_VALUE: 3
PIF_VALUE: 2
PIF_VALUE: 2
PIF_VALUE: 0
PIF_VALUE: 4
PIF_VALUE: 0
PIF_VALUE: 2
PIF_VALUE: 0
PIF_VALUE: 1
PIF_VALUE: 4

## 2019-09-05 ASSESSMENT — PAIN SCALES - GENERAL
PAINLEVEL_OUTOF10: 0

## 2019-09-05 ASSESSMENT — PAIN - FUNCTIONAL ASSESSMENT: PAIN_FUNCTIONAL_ASSESSMENT: 0-10

## 2019-09-05 NOTE — PROGRESS NOTES
Pt is alert and oriented, pt's wife is at bedside, pt is stable for phase2 of care, after he is seen by dr Jeremiah Le
Pt is alert and oriented. Eye drops have been started for post op care, per Dr Evelyn Moore orders. Pt and family member received printed and verbal education for post op care r/t Dr Evelyn Moore cataract removal with implant surgery both verbalized understanding and now further questions at this time. Took iv site out, applied pressure and dressed with dry gauze, pt tolerated well. Pt was taken out of department via wheelchair and family is taking pt home. Pt is stable for discharge.
Signed in. Side rails up x 2.
you have a Living Will and Durable Power of  for Healthcare, please bring in a copy. 13.  Notify your Surgeon if you develop any illness between now and surgery time; cough, cold, fever, sore throat, nausea, vomiting, etc.  Please notify your surgeon if you experience dizziness, shortness of breath or blurred vision between now and the time of your surgery. 16.  DO NOT shave your operative site 96 hours (4 days) prior to surgery. For face and neck surgery, men may use an electric razor 48 hours (2 days) prior to surgery. 17. Shower the night before surgery and the morning of surgery with  an antibacterial soap   or  Chlorhexidine gluconate (for total joint replacement). To provide excellent care, visitors will be limited to two in a room at any given time. Please no children under the age of 15 in the surgical department.

## 2019-09-09 NOTE — ANESTHESIA POSTPROCEDURE EVALUATION
Department of Anesthesiology  Postprocedure Note    Patient: Dominique Atkins  MRN: [id number]  YOB: 1940  Date of evaluation: 9/9/2019  Time:  10:32 AM     Procedure Summary     Date:  09/05/19 Room / Location:  [place] 01 / [facility] OR    Anesthesia Start:  0826 Anesthesia Stop:  6933    Procedure:  PHACO EMULSIFICATION OF CATARACT WITH INTRAOCULAR LENS IMPLANT EYE (Right Eye) Diagnosis:  (CATARACT RIGHT EYE)    Surgeon:  Matthew Wing MD Responsible Provider:  Charles Christopher MD    Anesthesia Type:  general ASA Status:  3          Anesthesia Type: general    Yasmine Phase I: Yasmine Score: 10    Yasmine Phase II: Yasmine Score: 10    Last vitals: Reviewed and per EMR flowsheets. Anesthesia Post Evaluation    Comments: Postoperative Anesthesia Note    Name:    Dominique Atkins  MRN:      [id number]    Empty flowsheet group.        LABS:    CBC  Lab Results       Component                Value               Date/Time                  WBC                      8.8                 05/25/2018 11:01 AM        HGB                      13.0 (L)            05/25/2018 11:01 AM        HCT                      39.1 (L)            05/25/2018 11:01 AM        PLT                      269                 05/25/2018 11:01 AM   RENAL  Lab Results       Component                Value               Date/Time                  NA                       139                 05/25/2018 11:01 AM        K                        5.2 (H)             05/25/2018 11:01 AM        CL                       102                 05/25/2018 11:01 AM        CO2                      27                  05/25/2018 11:01 AM        BUN                      26 (H)              05/25/2018 11:01 AM        CREATININE               1.0                 05/25/2018 11:01 AM        GLUCOSE                  122 (H)             05/25/2018 11:01 AM   COAGS  Lab Results       Component                Value               Date/Time

## 2019-10-07 PROCEDURE — 9900000038 HC CARDIAC REHAB PHASE 3 - MONTHLY

## 2019-10-28 ENCOUNTER — HOSPITAL ENCOUNTER (OUTPATIENT)
Dept: CARDIAC REHAB | Age: 79
Discharge: HOME | End: 2019-10-28

## 2019-12-02 PROCEDURE — 9900000038 HC CARDIAC REHAB PHASE 3 - MONTHLY

## 2019-12-30 ENCOUNTER — HOSPITAL ENCOUNTER (OUTPATIENT)
Dept: CARDIAC REHAB | Age: 79
Discharge: HOME | End: 2019-12-30

## 2020-01-06 PROCEDURE — 9900000038 HC CARDIAC REHAB PHASE 3 - MONTHLY

## 2020-01-27 ENCOUNTER — HOSPITAL ENCOUNTER (OUTPATIENT)
Dept: CARDIAC REHAB | Age: 80
Discharge: HOME | End: 2020-01-27

## 2020-02-03 PROCEDURE — 9900000038 HC CARDIAC REHAB PHASE 3 - MONTHLY

## 2020-02-24 ENCOUNTER — HOSPITAL ENCOUNTER (OUTPATIENT)
Dept: CARDIAC REHAB | Age: 80
Discharge: HOME | End: 2020-02-24

## 2020-03-13 PROCEDURE — 9900000038 HC CARDIAC REHAB PHASE 3 - MONTHLY

## 2020-03-30 ENCOUNTER — HOSPITAL ENCOUNTER (OUTPATIENT)
Dept: CARDIAC REHAB | Age: 80
Discharge: HOME | End: 2020-03-30

## 2021-06-02 ENCOUNTER — CLINICAL DOCUMENTATION (OUTPATIENT)
Dept: OTHER | Age: 81
End: 2021-06-02

## 2021-09-01 ENCOUNTER — HOSPITAL ENCOUNTER (INPATIENT)
Age: 81
LOS: 3 days | Discharge: HOME HEALTH | DRG: 291 | End: 2021-09-04
Attending: EMERGENCY MEDICINE | Admitting: HOSPITALIST
Payer: MEDICARE

## 2021-09-01 ENCOUNTER — APPOINTMENT (OUTPATIENT)
Dept: GENERAL RADIOLOGY | Age: 81
DRG: 291 | End: 2021-09-01
Payer: MEDICARE

## 2021-09-01 DIAGNOSIS — R07.9 CHEST PAIN, UNSPECIFIED TYPE: Primary | ICD-10-CM

## 2021-09-01 DIAGNOSIS — I50.23 ACUTE ON CHRONIC SYSTOLIC CONGESTIVE HEART FAILURE (HCC): ICD-10-CM

## 2021-09-01 PROBLEM — N17.9 ACUTE KIDNEY INJURY SUPERIMPOSED ON CKD (HCC): Status: ACTIVE | Noted: 2021-09-01

## 2021-09-01 PROBLEM — R09.89 PULMONARY VASCULAR CONGESTION: Status: ACTIVE | Noted: 2021-09-01

## 2021-09-01 PROBLEM — N18.9 ACUTE KIDNEY INJURY SUPERIMPOSED ON CKD (HCC): Status: ACTIVE | Noted: 2021-09-01

## 2021-09-01 PROBLEM — I48.0 PAF (PAROXYSMAL ATRIAL FIBRILLATION) (HCC): Status: ACTIVE | Noted: 2021-09-01

## 2021-09-01 PROBLEM — I10 HTN (HYPERTENSION): Status: ACTIVE | Noted: 2021-09-01

## 2021-09-01 PROBLEM — I50.9 ACUTE DECOMPENSATED HEART FAILURE (HCC): Status: ACTIVE | Noted: 2021-09-01

## 2021-09-01 LAB
A/G RATIO: 1.1 (ref 1.1–2.2)
ALBUMIN SERPL-MCNC: 4.1 G/DL (ref 3.4–5)
ALP BLD-CCNC: 49 U/L (ref 40–129)
ALT SERPL-CCNC: 6 U/L (ref 10–40)
ANION GAP SERPL CALCULATED.3IONS-SCNC: 13 MMOL/L (ref 3–16)
APTT: 35.5 SEC (ref 26.2–38.6)
AST SERPL-CCNC: 18 U/L (ref 15–37)
BASOPHILS ABSOLUTE: 0.2 K/UL (ref 0–0.2)
BASOPHILS RELATIVE PERCENT: 2.5 %
BILIRUB SERPL-MCNC: 0.9 MG/DL (ref 0–1)
BUN BLDV-MCNC: 36 MG/DL (ref 7–20)
CALCIUM SERPL-MCNC: 9.8 MG/DL (ref 8.3–10.6)
CHLORIDE BLD-SCNC: 98 MMOL/L (ref 99–110)
CO2: 27 MMOL/L (ref 21–32)
CREAT SERPL-MCNC: 1.6 MG/DL (ref 0.8–1.3)
EKG ATRIAL RATE: 50 BPM
EKG DIAGNOSIS: NORMAL
EKG Q-T INTERVAL: 454 MS
EKG QRS DURATION: 156 MS
EKG QTC CALCULATION (BAZETT): 503 MS
EKG R AXIS: 243 DEGREES
EKG T AXIS: 74 DEGREES
EKG VENTRICULAR RATE: 74 BPM
EOSINOPHILS ABSOLUTE: 0.4 K/UL (ref 0–0.6)
EOSINOPHILS RELATIVE PERCENT: 5.1 %
GFR AFRICAN AMERICAN: 51
GFR NON-AFRICAN AMERICAN: 42
GLOBULIN: 3.8 G/DL
GLUCOSE BLD-MCNC: 103 MG/DL (ref 70–99)
HCT VFR BLD CALC: 37.7 % (ref 40.5–52.5)
HEMOGLOBIN: 11.8 G/DL (ref 13.5–17.5)
INR BLD: 2.26 (ref 0.88–1.12)
LYMPHOCYTES ABSOLUTE: 1.9 K/UL (ref 1–5.1)
LYMPHOCYTES RELATIVE PERCENT: 23.7 %
MCH RBC QN AUTO: 26 PG (ref 26–34)
MCHC RBC AUTO-ENTMCNC: 31.1 G/DL (ref 31–36)
MCV RBC AUTO: 83.4 FL (ref 80–100)
MONOCYTES ABSOLUTE: 0.5 K/UL (ref 0–1.3)
MONOCYTES RELATIVE PERCENT: 6 %
NEUTROPHILS ABSOLUTE: 5 K/UL (ref 1.7–7.7)
NEUTROPHILS RELATIVE PERCENT: 62.7 %
PDW BLD-RTO: 16.8 % (ref 12.4–15.4)
PLATELET # BLD: 322 K/UL (ref 135–450)
PMV BLD AUTO: 8.7 FL (ref 5–10.5)
POTASSIUM REFLEX MAGNESIUM: 4.3 MMOL/L (ref 3.5–5.1)
PRO-BNP: 9814 PG/ML (ref 0–449)
PROTHROMBIN TIME: 26.4 SEC (ref 9.9–12.7)
RBC # BLD: 4.52 M/UL (ref 4.2–5.9)
SODIUM BLD-SCNC: 138 MMOL/L (ref 136–145)
TOTAL PROTEIN: 7.9 G/DL (ref 6.4–8.2)
TROPONIN: 0.06 NG/ML
TROPONIN: 0.06 NG/ML
WBC # BLD: 8 K/UL (ref 4–11)

## 2021-09-01 PROCEDURE — 84443 ASSAY THYROID STIM HORMONE: CPT

## 2021-09-01 PROCEDURE — 93010 ELECTROCARDIOGRAM REPORT: CPT | Performed by: INTERNAL MEDICINE

## 2021-09-01 PROCEDURE — 96374 THER/PROPH/DIAG INJ IV PUSH: CPT

## 2021-09-01 PROCEDURE — 80053 COMPREHEN METABOLIC PANEL: CPT

## 2021-09-01 PROCEDURE — 2700000000 HC OXYGEN THERAPY PER DAY

## 2021-09-01 PROCEDURE — 83880 ASSAY OF NATRIURETIC PEPTIDE: CPT

## 2021-09-01 PROCEDURE — 85730 THROMBOPLASTIN TIME PARTIAL: CPT

## 2021-09-01 PROCEDURE — 2060000000 HC ICU INTERMEDIATE R&B

## 2021-09-01 PROCEDURE — 80162 ASSAY OF DIGOXIN TOTAL: CPT

## 2021-09-01 PROCEDURE — 84484 ASSAY OF TROPONIN QUANT: CPT

## 2021-09-01 PROCEDURE — 85610 PROTHROMBIN TIME: CPT

## 2021-09-01 PROCEDURE — 83036 HEMOGLOBIN GLYCOSYLATED A1C: CPT

## 2021-09-01 PROCEDURE — 2580000003 HC RX 258: Performed by: HOSPITALIST

## 2021-09-01 PROCEDURE — 6360000002 HC RX W HCPCS: Performed by: EMERGENCY MEDICINE

## 2021-09-01 PROCEDURE — 99285 EMERGENCY DEPT VISIT HI MDM: CPT

## 2021-09-01 PROCEDURE — 84439 ASSAY OF FREE THYROXINE: CPT

## 2021-09-01 PROCEDURE — 6370000000 HC RX 637 (ALT 250 FOR IP): Performed by: EMERGENCY MEDICINE

## 2021-09-01 PROCEDURE — 94761 N-INVAS EAR/PLS OXIMETRY MLT: CPT

## 2021-09-01 PROCEDURE — 6370000000 HC RX 637 (ALT 250 FOR IP): Performed by: HOSPITALIST

## 2021-09-01 PROCEDURE — 93005 ELECTROCARDIOGRAM TRACING: CPT | Performed by: EMERGENCY MEDICINE

## 2021-09-01 PROCEDURE — 71045 X-RAY EXAM CHEST 1 VIEW: CPT

## 2021-09-01 PROCEDURE — 85025 COMPLETE CBC W/AUTO DIFF WBC: CPT

## 2021-09-01 PROCEDURE — 83550 IRON BINDING TEST: CPT

## 2021-09-01 PROCEDURE — 83540 ASSAY OF IRON: CPT

## 2021-09-01 PROCEDURE — 6360000002 HC RX W HCPCS: Performed by: HOSPITALIST

## 2021-09-01 RX ORDER — CARVEDILOL 12.5 MG/1
TABLET ORAL
Status: ON HOLD | COMMUNITY
Start: 2020-11-18 | End: 2021-09-04 | Stop reason: HOSPADM

## 2021-09-01 RX ORDER — DEXTROSE MONOHYDRATE 50 MG/ML
100 INJECTION, SOLUTION INTRAVENOUS PRN
Status: DISCONTINUED | OUTPATIENT
Start: 2021-09-01 | End: 2021-09-04 | Stop reason: HOSPADM

## 2021-09-01 RX ORDER — PRAVASTATIN SODIUM 40 MG
40 TABLET ORAL NIGHTLY
Status: DISCONTINUED | OUTPATIENT
Start: 2021-09-01 | End: 2021-09-04 | Stop reason: HOSPADM

## 2021-09-01 RX ORDER — SPIRONOLACTONE 25 MG/1
25 TABLET ORAL DAILY
Status: DISCONTINUED | OUTPATIENT
Start: 2021-09-01 | End: 2021-09-02

## 2021-09-01 RX ORDER — MAGNESIUM SULFATE IN WATER 40 MG/ML
2000 INJECTION, SOLUTION INTRAVENOUS PRN
Status: DISCONTINUED | OUTPATIENT
Start: 2021-09-01 | End: 2021-09-04 | Stop reason: HOSPADM

## 2021-09-01 RX ORDER — LOSARTAN POTASSIUM 25 MG/1
TABLET ORAL
COMMUNITY
Start: 2020-11-20

## 2021-09-01 RX ORDER — ASPIRIN 81 MG/1
324 TABLET, CHEWABLE ORAL ONCE
Status: COMPLETED | OUTPATIENT
Start: 2021-09-01 | End: 2021-09-01

## 2021-09-01 RX ORDER — DEXTROSE MONOHYDRATE 25 G/50ML
12.5 INJECTION, SOLUTION INTRAVENOUS PRN
Status: DISCONTINUED | OUTPATIENT
Start: 2021-09-01 | End: 2021-09-04 | Stop reason: HOSPADM

## 2021-09-01 RX ORDER — PRAVASTATIN SODIUM 40 MG
TABLET ORAL
COMMUNITY
Start: 2021-01-29

## 2021-09-01 RX ORDER — ACETAMINOPHEN 325 MG/1
650 TABLET ORAL EVERY 6 HOURS PRN
Status: DISCONTINUED | OUTPATIENT
Start: 2021-09-01 | End: 2021-09-04 | Stop reason: HOSPADM

## 2021-09-01 RX ORDER — FUROSEMIDE 10 MG/ML
40 INJECTION INTRAMUSCULAR; INTRAVENOUS ONCE
Status: COMPLETED | OUTPATIENT
Start: 2021-09-01 | End: 2021-09-01

## 2021-09-01 RX ORDER — ACETAMINOPHEN 650 MG/1
650 SUPPOSITORY RECTAL EVERY 6 HOURS PRN
Status: DISCONTINUED | OUTPATIENT
Start: 2021-09-01 | End: 2021-09-04 | Stop reason: HOSPADM

## 2021-09-01 RX ORDER — LOSARTAN POTASSIUM 25 MG/1
25 TABLET ORAL DAILY
Status: DISCONTINUED | OUTPATIENT
Start: 2021-09-02 | End: 2021-09-02

## 2021-09-01 RX ORDER — POTASSIUM CHLORIDE 20 MEQ/1
40 TABLET, EXTENDED RELEASE ORAL PRN
Status: DISCONTINUED | OUTPATIENT
Start: 2021-09-01 | End: 2021-09-04 | Stop reason: HOSPADM

## 2021-09-01 RX ORDER — ASPIRIN 81 MG/1
81 TABLET ORAL DAILY
Status: DISCONTINUED | OUTPATIENT
Start: 2021-09-02 | End: 2021-09-04 | Stop reason: HOSPADM

## 2021-09-01 RX ORDER — CITALOPRAM 20 MG/1
20 TABLET ORAL DAILY
Status: DISCONTINUED | OUTPATIENT
Start: 2021-09-02 | End: 2021-09-04 | Stop reason: HOSPADM

## 2021-09-01 RX ORDER — SPIRONOLACTONE 25 MG/1
TABLET ORAL
Status: ON HOLD | COMMUNITY
Start: 2021-03-30 | End: 2021-09-04 | Stop reason: HOSPADM

## 2021-09-01 RX ORDER — WARFARIN SODIUM 5 MG/1
5 TABLET ORAL ONCE
Status: COMPLETED | OUTPATIENT
Start: 2021-09-01 | End: 2021-09-01

## 2021-09-01 RX ORDER — POTASSIUM CHLORIDE 7.45 MG/ML
10 INJECTION INTRAVENOUS PRN
Status: DISCONTINUED | OUTPATIENT
Start: 2021-09-01 | End: 2021-09-04 | Stop reason: HOSPADM

## 2021-09-01 RX ORDER — FUROSEMIDE 40 MG/1
TABLET ORAL
Status: ON HOLD | COMMUNITY
Start: 2020-11-17 | End: 2021-09-04 | Stop reason: HOSPADM

## 2021-09-01 RX ORDER — ONDANSETRON 4 MG/1
4 TABLET, ORALLY DISINTEGRATING ORAL EVERY 8 HOURS PRN
Status: DISCONTINUED | OUTPATIENT
Start: 2021-09-01 | End: 2021-09-04 | Stop reason: HOSPADM

## 2021-09-01 RX ORDER — ONDANSETRON 2 MG/ML
4 INJECTION INTRAMUSCULAR; INTRAVENOUS EVERY 6 HOURS PRN
Status: DISCONTINUED | OUTPATIENT
Start: 2021-09-01 | End: 2021-09-04 | Stop reason: HOSPADM

## 2021-09-01 RX ORDER — PANTOPRAZOLE SODIUM 40 MG/1
40 TABLET, DELAYED RELEASE ORAL
Status: DISCONTINUED | OUTPATIENT
Start: 2021-09-02 | End: 2021-09-04 | Stop reason: HOSPADM

## 2021-09-01 RX ORDER — SENNA PLUS 8.6 MG/1
1 TABLET ORAL DAILY PRN
Status: DISCONTINUED | OUTPATIENT
Start: 2021-09-01 | End: 2021-09-04 | Stop reason: HOSPADM

## 2021-09-01 RX ORDER — CARVEDILOL 3.12 MG/1
6.25 TABLET ORAL 2 TIMES DAILY WITH MEALS
Status: DISCONTINUED | OUTPATIENT
Start: 2021-09-01 | End: 2021-09-04 | Stop reason: HOSPADM

## 2021-09-01 RX ORDER — SODIUM CHLORIDE 9 MG/ML
25 INJECTION, SOLUTION INTRAVENOUS PRN
Status: DISCONTINUED | OUTPATIENT
Start: 2021-09-01 | End: 2021-09-04 | Stop reason: HOSPADM

## 2021-09-01 RX ORDER — NICOTINE POLACRILEX 4 MG
15 LOZENGE BUCCAL PRN
Status: DISCONTINUED | OUTPATIENT
Start: 2021-09-01 | End: 2021-09-04 | Stop reason: HOSPADM

## 2021-09-01 RX ORDER — SODIUM CHLORIDE 0.9 % (FLUSH) 0.9 %
10 SYRINGE (ML) INJECTION PRN
Status: DISCONTINUED | OUTPATIENT
Start: 2021-09-01 | End: 2021-09-04 | Stop reason: HOSPADM

## 2021-09-01 RX ORDER — CARVEDILOL 3.12 MG/1
6.25 TABLET ORAL 2 TIMES DAILY WITH MEALS
Status: DISCONTINUED | OUTPATIENT
Start: 2021-09-02 | End: 2021-09-01

## 2021-09-01 RX ORDER — SODIUM CHLORIDE 0.9 % (FLUSH) 0.9 %
10 SYRINGE (ML) INJECTION EVERY 12 HOURS SCHEDULED
Status: DISCONTINUED | OUTPATIENT
Start: 2021-09-01 | End: 2021-09-04 | Stop reason: HOSPADM

## 2021-09-01 RX ORDER — OMEPRAZOLE 20 MG/1
CAPSULE, DELAYED RELEASE ORAL
COMMUNITY
Start: 2020-11-17

## 2021-09-01 RX ORDER — CITALOPRAM 40 MG/1
TABLET ORAL
COMMUNITY
Start: 2020-11-17 | End: 2021-09-01

## 2021-09-01 RX ORDER — INSULIN GLARGINE 100 [IU]/ML
0.15 INJECTION, SOLUTION SUBCUTANEOUS NIGHTLY
Status: DISCONTINUED | OUTPATIENT
Start: 2021-09-02 | End: 2021-09-03

## 2021-09-01 RX ORDER — WARFARIN SODIUM 2.5 MG/1
TABLET ORAL
COMMUNITY
Start: 2021-04-01

## 2021-09-01 RX ORDER — DIGOXIN 125 MCG
125 TABLET ORAL DAILY
Status: DISCONTINUED | OUTPATIENT
Start: 2021-09-02 | End: 2021-09-04 | Stop reason: HOSPADM

## 2021-09-01 RX ORDER — DIGOXIN 125 MCG
TABLET ORAL
COMMUNITY
Start: 2021-05-13

## 2021-09-01 RX ORDER — WARFARIN SODIUM 2.5 MG/1
TABLET ORAL
COMMUNITY
Start: 2021-06-24 | End: 2021-09-01

## 2021-09-01 RX ADMIN — ASPIRIN 324 MG: 81 TABLET, CHEWABLE ORAL at 14:55

## 2021-09-01 RX ADMIN — SPIRONOLACTONE 25 MG: 25 TABLET ORAL at 22:55

## 2021-09-01 RX ADMIN — FUROSEMIDE 5 MG/HR: 10 INJECTION, SOLUTION INTRAMUSCULAR; INTRAVENOUS at 19:03

## 2021-09-01 RX ADMIN — CARVEDILOL 6.25 MG: 3.12 TABLET, FILM COATED ORAL at 22:54

## 2021-09-01 RX ADMIN — PRAVASTATIN SODIUM 40 MG: 40 TABLET ORAL at 22:55

## 2021-09-01 RX ADMIN — WARFARIN SODIUM 5 MG: 5 TABLET ORAL at 22:55

## 2021-09-01 RX ADMIN — FUROSEMIDE 40 MG: 10 INJECTION, SOLUTION INTRAMUSCULAR; INTRAVENOUS at 14:26

## 2021-09-01 NOTE — H&P
HOSPITALISTS HISTORY AND PHYSICAL    9/1/2021 4:18 PM    Patient Information:  Nancy Greenfield is a 80 y.o. male [id number]  PCP:  Hao Miller MD (Tel: [phone] )    Chief complaint:    Chief Complaint   Patient presents with    Shortness of Breath     started a couple days ago,     Chest Pain     started a couple days ago, took two of nitro, hx of chf,         History of Present Illness:  Jamilah Dee is a 80 y.o. male who presented to the ED to be evaluated for a 2-day history of increased dyspnea accompanied with intermittent chest pain. He endorses an 11 pound weight gain, orthopnea, and hypoxia on room air at 85%. Patient has an extensive cardiac history including ischemic cardiomyopathy s/p Biotronik BiV ICD implantation, PAF, CAD s/p BMS to D1 and ELICIA to P RCA, HTN, HLD. Review of epic chart reveals that patient was initiated on digoxin therapy 2 months earlier, and he reports that his Aldactone was discontinued 1 month ago. Upon arrival to the ED EKG was obtained with a rate of 74, electronic ventricular pacemaker with QTC prolongation. CXR revealed cardiomegaly with pulmonary vascular congestion; no evidence of pneumonia. Notable labs include OLIVIER with BUN/CR of 36/1.6, BNP 9814, troponin 0.06, and hemoglobin 11.8. Patient received ASA and IV Lasix 40 mg x 1 in ED. Hospitalist consulted to admit for decompensated CHF, as well as ACS rule out. History obtained from patient and review of Epic chart:    ECHO 05/26/2020  - Left ventricle: The cavity size is mildly dilated. Wall thickness was increased in a pattern of     mild LVH. Systolic function was moderately to severely reduced. The estimated ejection fraction     was in the range of 30% to 35%. Diffuse hypokinesis.  Features are consistent with a pseudonormal     left ventricular filling pattern, with concomitant abnormal relaxation and increased filling     pressure (grade 2 diastolic dysfunction). - Regional wall motion abnormality: Severe hypokinesis of the basal inferior and basal inferolateral     myocardium.   - Aortic valve: Mild thickening. Mild calcification.   - Mitral valve: Mild regurgitation.   - Right ventricle: The cavity size is normal. Pacer wire in right ventricle. Systolic function was     normal by objective interpretation. TAPSE: 1.7cm.   - Right atrium: Pacer wire or catheter noted in right atrium.   - Pulmonary arteries: Systolic pressure could not be accurately estimated. - Inferior vena cava: Poorly visualized. REVIEW OF SYSTEMS:   Constitutional: Negative for fever,chills; +11 pound weight gain and and generalized weakness  ENT: Negative for headache, rhinorrhea, and sore throat. Respiratory: Positive for exertional dyspnea and cough  Cardiovascular: Positive for substernal chest pressure with increased peripheral edema; positive orthopnea  Gastrointestinal: Negative for N/V/D and abdominal pain; no hematemesis, hematochezia, or melena; no anorexia  Genitourinary: Negative for dysuria, frequency, retention; no incontinence  Hematologic/Lymphatic: Positive for bleeding tendency/excessive bruising on chronic Coumadin anticoagulation  Musculoskeletal: Negative for myalgias and arthalgias; able to ambulate without difficulty  Neurologic: Negative for LOC, seizure activity, paresthesias, dysarthria, vertigo, and gait disturbance  Skin: Negative for itching,rash, decubitus  Psychiatric: Negative for depression,anxiety, and agitation; no hallucinations; denies SI/HI  Endocrine: Negative for polyuria/polydipsia/polyphagia; no heat/cold intolerance    Past Medical History:   has a past medical history of Acute MI ([facility] Cancer ([facility] CHF (congestive heart failure) ([facility] Diabetes mellitus ([facility] GERD (gastroesophageal reflux disease), Hyperlipidemia, Hypertension, and Neuropathy.      Past Surgical History:   has a past surgical history that includes Splenectomy (2002); Cholecystectomy; Appendectomy; Pilonidal cyst excision; Tunneled venous port placement; Colonoscopy; Colonoscopy (10/7/13); Coronary angioplasty with stent; other surgical history (Left, 04/05/2019); Cataract removal with implant (Left, 08/08/2019); Intracapsular cataract extraction (Left, 8/8/2019); Cataract removal with implant (Right, 09/05/2019); and Intracapsular cataract extraction (Right, 9/5/2019). Medications:  No current facility-administered medications on file prior to encounter. Current Outpatient Medications on File Prior to Encounter   Medication Sig Dispense Refill    warfarin (COUMADIN) 2.5 MG tablet       furosemide (LASIX) 40 MG tablet Take 40 mg by mouth as needed       omeprazole (PRILOSEC) 20 MG delayed release capsule Take 20 mg by mouth Daily      nitroGLYCERIN (NITROSTAT) 0.4 MG SL tablet Place 1 tablet under the tongue every 5 minutes as needed for Chest pain 25 tablet 1    carvedilol (COREG) 6.25 MG tablet Take 6.25 mg by mouth 2 times daily (with meals).  citalopram (CELEXA) 40 MG tablet Take 40 mg by mouth daily.  pravastatin (PRAVACHOL) 40 MG tablet Take 40 mg by mouth daily.  losartan (COZAAR) 25 MG tablet Take 25 mg by mouth daily.  aspirin 81 MG tablet Take 81 mg by mouth daily          Allergies: Allergies   Allergen Reactions    Diclofenac Sodium Anaphylaxis     Anaphylaxis entered at [facility], date unknown. Patient currently takes Aspirin, tolerates oral Naproxen (2016) and BromSite (bromfenac) eye drops (2019).  Kiwi Extract Anaphylaxis    Adhesive Tape     Lipitor     Lisinopril     Lorazepam     Zocor [Simvastatin]         Social History:   reports that he quit smoking about 46 years ago. He has never used smokeless tobacco. He reports that he does not drink alcohol and does not use drugs. Family History:  family history is not on file.      Physical Exam:  /88   Pulse 70   Temp 98 °F (36.7 °C) (Oral)   Resp 21   Ht 6' 2\" (1.88 m)   Wt 236 lb (107 kg)   SpO2 94%   BMI 30.30 kg/m²     General appearance: Elderly male with tachypnea and abdominal breathing  Eyes: Sclera clear without conjunctival injection; PERRLA; EOMI  ENT: Mucous membranes moist without thrush; normal dentition  Neck: Supple without meningismus; no goiter; no carotid bruit bilaterally  Cardiovascular: Regular rhythm without ectopy; normal S1-S2 with no murmurs; 2+ pitting BLE peripheral edema; no JVD  Respiratory: Positive tachypnea; bibasilar rales without wheezing or rhonchi  Gastrointestinal: Abdomen soft, non-tender, not distended; bowel sounds normal; no masses/organomegaly appreciated  Musculoskeletal: FROM spine and extremities x4; no gross deformity  Neurology: A&O x3; cranial nerves 2-12 grossly intact; motor 5/5  BUE/BLE; no seizure activity; finger-to-nose/heel-to-shin intact; no pronator drift  Psychiatry: Well-groomed with good eye contact; appropriate affect; no visual/auditory hallucination  Skin: Warm, dry, normal turgor, no rash  PV: 2/4 radial and dorsalis pedis bilaterally; brisk capillary refill    Labs:  CBC:   Lab Results   Component Value Date    WBC 8.0 09/01/2021    RBC 4.52 09/01/2021    HGB 11.8 09/01/2021    HCT 37.7 09/01/2021    MCV 83.4 09/01/2021    MCH 26.0 09/01/2021    MCHC 31.1 09/01/2021    RDW 16.8 09/01/2021     09/01/2021    MPV 8.7 09/01/2021     BMP:    Lab Results   Component Value Date     09/01/2021    K 4.3 09/01/2021    CL 98 09/01/2021    CO2 27 09/01/2021    BUN 36 09/01/2021    CREATININE 1.6 09/01/2021    CALCIUM 9.8 09/01/2021    GFRAA 51 09/01/2021    LABGLOM 42 09/01/2021    GLUCOSE 103 09/01/2021     XR CHEST PORTABLE   Final Result   Cardiomegaly with pulmonary vascular congestion. No evidence of pulmonary   edema. No radiographic findings of pneumonia               EKG:     Ventricular Rate 74 BPM QTc Calculation (Bazett) 503 ms   Atrial Rate 50 BPM R Axis 243 degrees   QRS Duration 156 ms T Axis 74 degrees   Q-T Interval 454 ms Diagnosis Electronic ventricular pacemakerConfirmed        I visualized CXR images and EKG strips personally and agree with documented interpretation    Discussed case  with ED provider    Problem List  Active Problems:    Chest pain with moderate risk of acute coronary syndrome    Heart failure, systolic, with acute decompensation ([facility])    Pulmonary vascular congestion    Elevated troponin I level    DM2 (diabetes mellitus, type 2) (HCC)    Acute kidney injury superimposed on CKD (HCC)    HTN (hypertension)    PAF (paroxysmal atrial fibrillation) ([facility]  Resolved Problems:    * No resolved hospital problems.  *        Assessment/Plan:     Acute chest pain with CAD  -Admit to telemetry floor with serial cardiac enzymes to be obtained overnight  -ASA administered in ED and to be continued at 81 mg PO QAM  -High dose statin therapy initiated overnight; obtain FLP in a.m.  -Nitropaste applied to chest wall (BP permitting)  -ECHO scheduled for a.m.  -Exercise treadmill vs Lexiscan nuclear stress testing scheduled for a.m.  -Therapeutic anticoagulation not indicated at this time  -Consultation placed to cardiologist for further recommendations    Decompensated systolic heart failure  -Admit to floor for continuous telemetry monitoring and strict I's & O's  -Lasix 40 mg IV x1 followed by Lasix GTT at 5 cc/H overnight  -Continue home doses of digoxin, Coreg, Aldactone, Cozaar, and ASA  -ECHO scheduled to further assess cardiac structure and function  -2G Na and fluid restriction dietary modifications in place  -Consult placed to Cardiology for further recommendations    PAF  -Patient currently rate controlled on Coreg and digoxin  -Continue Coumadin anticoagulation with consultation to pharmacy for dosing recommendations    OLIVIER with CKD  -Concern for cardiorenal syndrome  -Patient admitted to telemetry floor with strict I's and O's during stay  -Straight cath for urinary retention greater than 300 cc  -Urine spot protein, microalbumin, FENa studies pending  -Complete renal ultrasound scheduled in a.m. to assess for renal atrophy/LUTS  -Consultation placed to [facility] for further recommendations    Type II DM  -A1c ordered with results pending at time of dictation  -Home hypoglycemic medications placed on temporary hold  -Weight-based basal insulin initiated QHS  -Humalog medium dose SSI scheduled before meals and at bedtime based on POC glucose  -Carbohydrate restriction placed on diet    DVT prophylaxis-continue Coumadin with pharmacy consulted for dosage  Code status-full code  Diet-cardiac 2 g sodium with fluid and carb restriction  IV access-PIV established in ED      Admit as inpatient. I anticipate hospitalization spanning more than two midnights for investigation and treatment of the above medically necessary diagnoses. Please note that some part of this chart was generated using Dragon dictation software. Although every effort was made to ensure the accuracy of this automated transcription, some errors in transcription may have occurred inadvertently. If you may need any clarification, please do not hesitate to contact me through [facility].        Melissa Coleman MD    9/1/2021 4:18 PM

## 2021-09-02 ENCOUNTER — APPOINTMENT (OUTPATIENT)
Dept: ULTRASOUND IMAGING | Age: 81
DRG: 291 | End: 2021-09-02
Payer: MEDICARE

## 2021-09-02 LAB
ANION GAP SERPL CALCULATED.3IONS-SCNC: 12 MMOL/L (ref 3–16)
BASOPHILS ABSOLUTE: 0 K/UL (ref 0–0.2)
BASOPHILS RELATIVE PERCENT: 0 %
BILIRUBIN URINE: NEGATIVE
BLOOD, URINE: NEGATIVE
BUN BLDV-MCNC: 34 MG/DL (ref 7–20)
CALCIUM SERPL-MCNC: 9.8 MG/DL (ref 8.3–10.6)
CHLORIDE BLD-SCNC: 96 MMOL/L (ref 99–110)
CHOLESTEROL, TOTAL: 113 MG/DL (ref 0–199)
CLARITY: CLEAR
CO2: 32 MMOL/L (ref 21–32)
COLOR: YELLOW
CREAT SERPL-MCNC: 1.6 MG/DL (ref 0.8–1.3)
CREATININE URINE: 19.5 MG/DL (ref 39–259)
DIGOXIN LEVEL: 0.7 NG/ML (ref 0.8–2)
EOSINOPHILS ABSOLUTE: 0.3 K/UL (ref 0–0.6)
EOSINOPHILS RELATIVE PERCENT: 3.8 %
ESTIMATED AVERAGE GLUCOSE: 139.9 MG/DL
ESTIMATED AVERAGE GLUCOSE: 142.7 MG/DL
GFR AFRICAN AMERICAN: 51
GFR NON-AFRICAN AMERICAN: 42
GLUCOSE BLD-MCNC: 111 MG/DL (ref 70–99)
GLUCOSE BLD-MCNC: 111 MG/DL (ref 70–99)
GLUCOSE BLD-MCNC: 119 MG/DL (ref 70–99)
GLUCOSE BLD-MCNC: 134 MG/DL (ref 70–99)
GLUCOSE BLD-MCNC: 140 MG/DL (ref 70–99)
GLUCOSE URINE: NEGATIVE MG/DL
HBA1C MFR BLD: 6.5 %
HBA1C MFR BLD: 6.6 %
HCT VFR BLD CALC: 36.2 % (ref 40.5–52.5)
HDLC SERPL-MCNC: 34 MG/DL (ref 40–60)
HEMOGLOBIN: 11.4 G/DL (ref 13.5–17.5)
INR BLD: 2.28 (ref 0.88–1.12)
IRON SATURATION: 8 % (ref 20–50)
IRON: 37 UG/DL (ref 59–158)
KETONES, URINE: NEGATIVE MG/DL
LDL CHOLESTEROL CALCULATED: 56 MG/DL
LEUKOCYTE ESTERASE, URINE: NEGATIVE
LV EF: 28 %
LVEF MODALITY: NORMAL
LYMPHOCYTES ABSOLUTE: 1.5 K/UL (ref 1–5.1)
LYMPHOCYTES RELATIVE PERCENT: 18.6 %
MAGNESIUM: 1.8 MG/DL (ref 1.8–2.4)
MCH RBC QN AUTO: 25.9 PG (ref 26–34)
MCHC RBC AUTO-ENTMCNC: 31.5 G/DL (ref 31–36)
MCV RBC AUTO: 82.2 FL (ref 80–100)
MICROALBUMIN UR-MCNC: 3.7 MG/DL
MICROALBUMIN/CREAT UR-RTO: 189.7 MG/G (ref 0–30)
MICROSCOPIC EXAMINATION: NORMAL
MONOCYTES ABSOLUTE: 0.4 K/UL (ref 0–1.3)
MONOCYTES RELATIVE PERCENT: 4.4 %
NEUTROPHILS ABSOLUTE: 6 K/UL (ref 1.7–7.7)
NEUTROPHILS RELATIVE PERCENT: 73.2 %
NITRITE, URINE: NEGATIVE
PDW BLD-RTO: 17.1 % (ref 12.4–15.4)
PERFORMED ON: ABNORMAL
PH UA: 7.5 (ref 5–8)
PLATELET # BLD: 318 K/UL (ref 135–450)
PMV BLD AUTO: 7.9 FL (ref 5–10.5)
POTASSIUM SERPL-SCNC: 4.6 MMOL/L (ref 3.5–5.1)
PROTEIN PROTEIN: 7 MG/DL
PROTEIN UA: NEGATIVE MG/DL
PROTHROMBIN TIME: 26.7 SEC (ref 9.9–12.7)
RBC # BLD: 4.4 M/UL (ref 4.2–5.9)
SODIUM BLD-SCNC: 140 MMOL/L (ref 136–145)
SPECIFIC GRAVITY UA: 1.01 (ref 1–1.03)
T4 FREE: 1.4 NG/DL (ref 0.9–1.8)
TOTAL IRON BINDING CAPACITY: 439 UG/DL (ref 260–445)
TRIGL SERPL-MCNC: 115 MG/DL (ref 0–150)
TROPONIN: 0.06 NG/ML
TROPONIN: 0.06 NG/ML
TSH SERPL DL<=0.05 MIU/L-ACNC: 1.68 UIU/ML (ref 0.27–4.2)
URINE TYPE: NORMAL
UROBILINOGEN, URINE: 1 E.U./DL
VLDLC SERPL CALC-MCNC: 23 MG/DL
WBC # BLD: 8.2 K/UL (ref 4–11)

## 2021-09-02 PROCEDURE — 83036 HEMOGLOBIN GLYCOSYLATED A1C: CPT

## 2021-09-02 PROCEDURE — 97161 PT EVAL LOW COMPLEX 20 MIN: CPT

## 2021-09-02 PROCEDURE — 2700000000 HC OXYGEN THERAPY PER DAY

## 2021-09-02 PROCEDURE — 81003 URINALYSIS AUTO W/O SCOPE: CPT

## 2021-09-02 PROCEDURE — 97530 THERAPEUTIC ACTIVITIES: CPT

## 2021-09-02 PROCEDURE — 84484 ASSAY OF TROPONIN QUANT: CPT

## 2021-09-02 PROCEDURE — 80048 BASIC METABOLIC PNL TOTAL CA: CPT

## 2021-09-02 PROCEDURE — 97116 GAIT TRAINING THERAPY: CPT

## 2021-09-02 PROCEDURE — 97166 OT EVAL MOD COMPLEX 45 MIN: CPT

## 2021-09-02 PROCEDURE — 2580000003 HC RX 258: Performed by: HOSPITALIST

## 2021-09-02 PROCEDURE — 94761 N-INVAS EAR/PLS OXIMETRY MLT: CPT

## 2021-09-02 PROCEDURE — C8929 TTE W OR WO FOL WCON,DOPPLER: HCPCS

## 2021-09-02 PROCEDURE — 76770 US EXAM ABDO BACK WALL COMP: CPT

## 2021-09-02 PROCEDURE — 2060000000 HC ICU INTERMEDIATE R&B

## 2021-09-02 PROCEDURE — 85025 COMPLETE CBC W/AUTO DIFF WBC: CPT

## 2021-09-02 PROCEDURE — 6370000000 HC RX 637 (ALT 250 FOR IP): Performed by: INTERNAL MEDICINE

## 2021-09-02 PROCEDURE — 6370000000 HC RX 637 (ALT 250 FOR IP): Performed by: HOSPITALIST

## 2021-09-02 PROCEDURE — 83735 ASSAY OF MAGNESIUM: CPT

## 2021-09-02 PROCEDURE — 36415 COLL VENOUS BLD VENIPUNCTURE: CPT

## 2021-09-02 PROCEDURE — 97535 SELF CARE MNGMENT TRAINING: CPT

## 2021-09-02 PROCEDURE — 99222 1ST HOSP IP/OBS MODERATE 55: CPT | Performed by: INTERNAL MEDICINE

## 2021-09-02 PROCEDURE — 97110 THERAPEUTIC EXERCISES: CPT

## 2021-09-02 PROCEDURE — 80061 LIPID PANEL: CPT

## 2021-09-02 PROCEDURE — 82570 ASSAY OF URINE CREATININE: CPT

## 2021-09-02 PROCEDURE — 82043 UR ALBUMIN QUANTITATIVE: CPT

## 2021-09-02 PROCEDURE — 6360000002 HC RX W HCPCS: Performed by: HOSPITALIST

## 2021-09-02 PROCEDURE — 85610 PROTHROMBIN TIME: CPT

## 2021-09-02 PROCEDURE — 84156 ASSAY OF PROTEIN URINE: CPT

## 2021-09-02 RX ORDER — WARFARIN SODIUM 2.5 MG/1
2.5 TABLET ORAL
Status: COMPLETED | OUTPATIENT
Start: 2021-09-02 | End: 2021-09-02

## 2021-09-02 RX ADMIN — PRAVASTATIN SODIUM 40 MG: 40 TABLET ORAL at 21:10

## 2021-09-02 RX ADMIN — ASPIRIN 81 MG: 81 TABLET, COATED ORAL at 10:04

## 2021-09-02 RX ADMIN — FUROSEMIDE 5 MG/HR: 10 INJECTION, SOLUTION INTRAMUSCULAR; INTRAVENOUS at 10:03

## 2021-09-02 RX ADMIN — CITALOPRAM HYDROBROMIDE 20 MG: 20 TABLET ORAL at 10:03

## 2021-09-02 RX ADMIN — PANTOPRAZOLE SODIUM 40 MG: 40 TABLET, DELAYED RELEASE ORAL at 06:05

## 2021-09-02 RX ADMIN — CARVEDILOL 6.25 MG: 3.12 TABLET, FILM COATED ORAL at 18:25

## 2021-09-02 RX ADMIN — DIGOXIN 125 MCG: 125 TABLET ORAL at 10:03

## 2021-09-02 RX ADMIN — WARFARIN SODIUM 2.5 MG: 2.5 TABLET ORAL at 18:25

## 2021-09-02 RX ADMIN — CARVEDILOL 6.25 MG: 3.12 TABLET, FILM COATED ORAL at 10:05

## 2021-09-02 ASSESSMENT — ENCOUNTER SYMPTOMS
NAUSEA: 0
SHORTNESS OF BREATH: 1
SORE THROAT: 0
COUGH: 0
RHINORRHEA: 0
CONSTIPATION: 0
BLOOD IN STOOL: 0
DIARRHEA: 0
ABDOMINAL PAIN: 0
VOMITING: 0
EYE PAIN: 0
PHOTOPHOBIA: 0

## 2021-09-02 ASSESSMENT — PAIN SCALES - GENERAL: PAINLEVEL_OUTOF10: 0

## 2021-09-02 NOTE — CONSULTS
[address] [place] [address]  [phone]      Attending Physician: Gerardo Monae MD  Reason for Consultation/Chief Complaint: Acute heart failure, chest pain    Subjective   History of Present Illness:  Arnol Goff is a 72-year-old male with a history of CAD s/p BMS to D1 in 4/2016 and ELICIA to proximal RCA in 7/2017, chronic LV systolic heart failure likely secondary to mixed ischemic and non-ischemic etiologies s/p Biotronik BiV-ICD, persistent atrial fibrillation, DVT/PE, Klinefelter's syndrome, type II DM, essential hypertension, hyperlipidemia, and GERD admitted with acute on chronic LV systolic heart failure. The patient reports that over the last several weeks, he has had progressively worsening congestion and shortness of breath with relatively minimal exertion. He initially thought that his symptoms may be due to allergies, but he did not experience any improvement after taking antihistamines. He also describes chest discomfort that feels like congestion or having a weight on his chest at times. He has also had worsening lower extremity edema and believes that he has gained 11 lbs. His troponins appear to be chronically mildly elevated and have trended 0.06-->0.06-->0.06 since admission. ECG shows atrial fibrillation with a V-paced rhythm. Pro-BNP was 9,814. Chest x-ray showed pulmonary vascular congestion. Creatinine was elevated at 1.6 from previously normal baseline. Past Medical History:   has a past medical history of Acute MI ([facility] [address] Cancer ([facility] [address] CHF (congestive heart failure) ([facility] [address] Diabetes mellitus ([facility] [address] GERD (gastroesophageal reflux disease), Hyperlipidemia, Hypertension, and Neuropathy. Surgical History:   has a past surgical history that includes Splenectomy (2002); Cholecystectomy; Appendectomy; Pilonidal cyst excision; Tunneled venous port placement; Colonoscopy; Colonoscopy (10/7/13);  Coronary angioplasty with stent; other surgical history (Left, 04/05/2019); Cataract removal with implant (Left, 08/08/2019); Intracapsular cataract extraction (Left, 8/8/2019); Cataract removal with implant (Right, 09/05/2019); and Intracapsular cataract extraction (Right, 9/5/2019). Social History:   reports that he quit smoking about 46 years ago. He has never used smokeless tobacco. He reports that he does not drink alcohol and does not use drugs. Family History:  No family history of early onset CAD or sudden cardiac death. Home Medications:  Were reviewed and are listed in nursing record and/or below  Prior to Admission medications    Medication Sig Start Date End Date Taking? Authorizing Provider   digoxin (LANOXIN) 125 MCG tablet TAKE 1 TABLET BY MOUTH EVERY DAY 5/13/21  Yes Historical Provider, MD   metFORMIN (GLUCOPHAGE) 1000 MG tablet  8/3/21  Yes Historical Provider, MD   carvedilol (COREG) 12.5 MG tablet TAKE 1 TABLET BY MOUTH  TWICE DAILY WITH MEALS 11/18/20  Yes Historical Provider, MD   spironolactone (ALDACTONE) 25 MG tablet TAKE ONE-HALF TABLET BY  MOUTH DAILY FOR CHRONIC  HEART FAILURE 3/30/21  Yes Historical Provider, MD   furosemide (LASIX) 40 MG tablet TAKE 1 TABLET BY MOUTH  DAILY 11/17/20  Yes Historical Provider, MD   losartan (COZAAR) 25 MG tablet TAKE 1 TABLET BY MOUTH  DAILY FOR CHRONIC HEART  FAILURE, HYPERTENSION 11/20/20  Yes Historical Provider, MD   omeprazole (PRILOSEC) 20 MG delayed release capsule TAKE 1 CAPSULE BY MOUTH  DAILY FOR GASTROESOPHAGEAL  REFLUX DISEASE 11/17/20  Yes Historical Provider, MD   pravastatin (PRAVACHOL) 40 MG tablet TAKE 1 TABLET BY MOUTH  DAILY 1/29/21  Yes Historical Provider, MD   warfarin (COUMADIN) 2.5 MG tablet Take as directed by [facility]. Current warfarin dose 5 mg Mon/Wed/Sat; 2.5 mg all other days of the week.  4/1/21  Yes Historical Provider, MD   vitamin D (CHOLECALCIFEROL) 25 MCG (1000 UT) TABS tablet Take 1,000 Units by mouth daily   Yes Historical Provider, MD   Omega-3 tablet 6.25 mg, BID         Allergies:  Diclofenac sodium, Kiwi extract, Adhesive tape, Lipitor, Lisinopril, Lorazepam, and Zocor [simvastatin]     Review of Systems:   Review of Systems   Constitutional: Negative for chills and fever. HENT: Positive for congestion. Negative for rhinorrhea and sore throat. Eyes: Negative for photophobia, pain and visual disturbance. Respiratory: Positive for shortness of breath. Negative for cough. Cardiovascular: Positive for chest pain and leg swelling. Negative for palpitations. Gastrointestinal: Negative for abdominal pain, blood in stool, constipation, diarrhea, nausea and vomiting. Endocrine: Negative for cold intolerance and heat intolerance. Genitourinary: Negative for difficulty urinating, dysuria and hematuria. Musculoskeletal: Negative for arthralgias and myalgias. Skin: Negative for rash and wound. Allergic/Immunologic: Positive for environmental allergies. Negative for food allergies. Neurological: Negative for dizziness, syncope and light-headedness. Hematological: Negative for adenopathy. Does not bruise/bleed easily. Psychiatric/Behavioral: Negative for dysphoric mood. The patient is not nervous/anxious. Objective   PHYSICAL EXAM:    Vitals:    09/02/21 0428   BP: 119/77   Pulse: 70   Resp: 16   Temp: 98 °F (36.7 °C)   SpO2: 96%    Weight: 229 lb 3.2 oz (104 kg)       General: Elderly male resting in bed in no acute distress. Pleasant and interactive on exam.  HEENT: Normocephalic, atraumatic, non-icteric, hearing intact, nares normal, mucous membranes moist.  Neck: Supple, trachea midline. No adenopathy. No thyromegaly. No carotid bruits. JVP elevated. Heart: Regular rate and rhythm. Normal S1 and S2. Grade II/VI holosystolic murmur. No rubs or gallops. Lungs: Normal respiratory effort. Breath sounds diminished bilaterally. Faint bibasilar crackles. No wheezes or rhonchi. Abdomen: Soft, non-tender. Normoactive bowel sounds. No masses or organomegaly. Skin: No rashes, wounds, or lesions. Pulses: 2+ and symmetric. Extremities: 1+ bilateral pitting LE edema. No clubbing or cyanosis. Musculoskeletal: Spontaneously moves all four extremities. Psych: Normal mood and affect. Neuro: Alert and oriented to person, place, and time. No focal deficits noted. Labs   CBC:   Lab Results   Component Value Date    WBC 8.2 09/02/2021    RBC 4.40 09/02/2021    HGB 11.4 09/02/2021    HCT 36.2 09/02/2021    MCV 82.2 09/02/2021    RDW 17.1 09/02/2021     09/02/2021     CMP:  Lab Results   Component Value Date     09/02/2021    K 4.6 09/02/2021    K 4.3 09/01/2021    CL 96 09/02/2021    CO2 32 09/02/2021    BUN 34 09/02/2021    CREATININE 1.6 09/02/2021    GFRAA 51 09/02/2021    AGRATIO 1.1 09/01/2021    LABGLOM 42 09/02/2021    GLUCOSE 111 09/02/2021    PROT 7.9 09/01/2021    CALCIUM 9.8 09/02/2021    BILITOT 0.9 09/01/2021    ALKPHOS 49 09/01/2021    AST 18 09/01/2021    ALT 6 09/01/2021     PT/INR:  No results found for: PTINR  HgBA1c:No results found for: LABA1C  Lab Results   Component Value Date    TROPONINI 0.06 (H) 09/02/2021         Cardiac Data     Last EKG: Atrial fibrillation, V-paced rhythm. Echo:  TTE 12/20/17:  Study Conclusions   - Left ventricle: The cavity size was normal. Wall thickness was     normal. Systolic function was moderately reduced. The estimated     ejection fraction was in the range of 35% to 40%. Moderate diffuse     hypokinesis with regional variations and a small akinetic segment     in the apex. - Right ventricle: Systolic function was mildly reduced by objective     interpretation. TAPSE: 1.5cm. Tricuspid annular systolic velocity:     7CU/X.   - Atrial septum: Agitated saline contrast study showed no     right-to-left atrial level shunt. Impressions:  Technically difficult study, IV contrast was used,   likely no change from previous report.      TTE 12/13/18:  Study Conclusions   - Left interpretation. TAPSE: 1.7cm.   - Right atrium: Pacer wire or catheter noted in right atrium.   - Pulmonary arteries: Systolic pressure could not be accurately estimated. - Inferior vena cava: Poorly visualized. Stress Test:  Nuclear PET Stress 4/113/17:  FINAL INTERPRETATION   Abnormal study with evidence of ischemia and scar. There is moderate left ventricular enlargement with severely reduced global left ventricular wall motion at stress. The right ventricle is dilated.  Overall study quality is good.  There is study artifact related to subdiaphragmatic activity. Scan significance indicates high cardiac risk. Findings were conveyed to Dr. Lauro Call via [facility] at 12 00. PERFUSION FINDINGS   There is a medium size area of mildly reduced perfusion in the mid inferolateral, apical lateral, apical inferior and true apical segments at stress with partial improvement at rest, consistent with predominant ischemia. There is a second, small area of mildly reduced perfusion in the mid anterior and apical anterior segments at stress with partial improvement at rest.   The sum stress score is 4 (normal: less than 4, mildly abnormal: 4-8, severely abnormal: greater than 8). CORONARY FLOW RESERVE   There is moderate, globally impaired coronary flow reserve with severe inferior impairment. Cath:  [facility] 10/27/17: IMPRESSIONS:  The previously placed stents in the ostial RCA and D1   are patent. The prox-mid LAD lesion is not hemodynamically significan   t by FFR (0.86). The LV gram showed low EF (30%) with diffuse hypokin   esis and mild MR.   SUMMARY:     1. Coronary arteries: The coronary circulation is right dominant.      The left main bifurcates normally into the LAD and circumflex.      The left anterior descending gives rise to 1 diagonal. The left      circumflex gives rise to 3 obtuse marginals. The right coronary      gives rise to the posterior descending artery and 2      posterolaterals. 2. Left main: Patent. 3. LAD: Mid-vessel lesion: There is a 50% stenosis. 4. 1st diagonal: Well visualized. Medium-large. Prior intervention:      stent in the mid D1. The stented segment is patent. 5. 1st septal: Lesion: There is a 90% stenosis. 6. Left circumflex: Mid-vessel lesion: There is a 30% stenosis. 7. Right coronary: Prior intervention: stent in the proximal RCA.    Proximal vessel lesion: There is a 30% in-stent restenosis. 8. RCA posterolateral extension: Lesion: There is a 20% stenosis. RECOMMENDATIONS:     1. Intensive medical therapy for CAD. 2. Since the degree of cardiomyopathy is disproportionate to the      CAD, further evaluation for non-ischemic etiologies is      recommended. Other Studies:   Chest X-ray 9/1/21:  Cardiomegaly with pulmonary vascular congestion.  No evidence of pulmonary   edema.  No radiographic findings of pneumonia         Assessment:      Jocelynn Dumont is a 79-year-old male with the above past medical history admitted with acute on chronic LV systolic heart failure and acute kidney injury likely secondary to venous congestion and cardiorenal syndrome. He appears to have a mild chronic troponin elevation with recent slight increase from baseline likely attributable to his OLIVIER. His overall presentation and troponin trend do not appear to be consistent with acute coronary syndrome and it is suspected that his atypical chest pain, which he describes as much different from his previous angina, is likely due to his acute heart failure elevated cardiac filling pressures. However, if his symptoms persist after he has been adequately diuresed and his kidney function has stabilized, we can consider risks/benefits of repeat invasive coronary angiography at that time. 1. Acute on chronic LV systolic heart failure  2. Mild troponin T elevation  3. Atypical chest pain  4. Acute kidney injury, likely secondary to cardiorenal syndrome  5.  CAD s/p BMS to D1 in 4/2016 and ELICIA to proximal RCA in 7/2017  6. 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 S/p Biotronik BiV-ICD  8. Persistent atrial fibrillation  9. H/o DVT/PE  10. Klinefelter's syndrome  11. Type II DM  12. Essential hypertension  13. Hyperlipidemia  14. GERD        Plan:     1. Will continue to diurese with IV lasix infusion at 5 mg/hr and aim to make patient goal net negative 2-3L over the next 24 hours. 2. Hold losartan and aldactone in setting of OLIVIER. 3. Continue aspirin 81 mg daily, pravastatin 40 mg daily, coreg 6.25 mg BID, and digoxin 125 mcg daily (dig level 0.7). 4. Continue coumadin for goal INR 2-3.  5. Obtain TTE to re-evaluate cardiac structure and function. 6. Will arrange for patient's ICD to be interrogated. 7. Monitor strict I/Os, obtain daily standing weights. 8. 2L per day fluid restriction, low sodium diet. 9. Trend renal panels and and magnesium levels and replete electrolytes as needed to maintain goal K>4 and Mg>2.  10. Wean supplemental O2 as tolerated to maintain oxygen saturation >90%. 11. Monitor on telemetry. 12. If continues to have shortness of breath or develops recurrent chest pain after he has been adequately diuresed and kidney function has stabilized, can re-evaluate risks/benefits of repeat invasive coronary angiography at that time. Thank you for allowing us to participate in the care of Josiah Culver. Please call me with any questions [phone]. Vinny Murray, DO  [facility]  [phone] [facility]  [phone] [address] [place] [address]  9/2/2021 8:02 AM      I will address the patient's cardiac risk factors and adjusted pharmacologic treatment as needed. In addition, I have reinforced the need for patient directed risk factor modification. All questions and concerns were addressed to the patient/family. Alternatives to my treatment were discussed. The note was completed using EMR.  Every effort was made to

## 2021-09-02 NOTE — CONSULTS
Nutrition Education    Pt seen per [facility] for CHF diet education. Provided pt with written and verbal instruction on HF nutrition therapy. Discussed low sodium diet, daily weights, and fluid restriction. Pt voiced understanding. Pt likely consuming greater than 64 ounces daily. Discussed what is considered a fluid. Time spent: 10 minutes    Pt and family deny questions on carb control diet. Pt with stable blood sugars this admission and A1c of 6.6% on 9/1/21. RD modified diet to carb control, 2 gm sodium. Will continue to monitor. · Verbally reviewed information with Patient and Family  · Educated on CHF  · Written educational materials provided. · Contact name and number provided. · Refer to Patient Education activity for more details.     Electronically signed by Chetan Tucker MS, RD, LD on 9/2/21 at 2:11 PM EDT    Contact: [phone]

## 2021-09-02 NOTE — PROGRESS NOTES
4 Eyes Skin Assessment     NAME:  Ozzie Cao OF BIRTH:  1940  MEDICAL RECORD NUMBER:  [id number]    The patient is being assess for  Admission    I agree that 2 RN's have performed a thorough Head to Toe Skin Assessment on the patient. ALL assessment sites listed below have been assessed. Areas assessed by both nurses:    Head, Face, Ears, Shoulders, Back, Chest, Arms, Elbows, Hands, Sacrum. Buttock, Coccyx, Ischium and Legs. Feet and Heels        Does the Patient have a Wound?  No noted wound(s)       [place] Prevention initiated:  Yes   Wound Care Orders initiated:  No    Pressure Injury (Stage 3,4, Unstageable, DTI, NWPT, and Complex wounds) if present place consult order under [de-identified] No    New and Established Ostomies if present place consult order under : No      Nurse 1 eSignature: Electronically signed by Tamica Ho RN on 9/2/21 at 4:55 AM EDT    **SHARE this note so that the co-signing nurse is able to place an eSignature**    Nurse 2 eSignature: {Esignature:[id number]}

## 2021-09-02 NOTE — PROGRESS NOTES
Hospitalist Progress Note      PCP: Natividad Rees MD    Date of Admission: 9/1/2021    Chief Complaint: SOB , Chest Pain     Hospital Course: Reviewed H&P     Subjective: Patient admitted overnight with acute decompensated CHF and placed on IV Lasix gtt. Patient seen and examined this morning. Reports feeling slightly better. Denies any chest pain, palpitations, nausea, vomiting, diarrhea. Cardiology consulted-we will follow recommendations.    -D/w Patient , RN and with SW during interdisciplinary rounds      Medications:  Reviewed    Infusion Medications    dextrose      sodium chloride       Scheduled Medications    warfarin  2.5 mg Oral Once    citalopram  20 mg Oral Daily    digoxin  125 mcg Oral Daily    pantoprazole  40 mg Oral QAM AC    pravastatin  40 mg Oral Nightly    aspirin  81 mg Oral Daily    insulin glargine  0.15 Units/kg SubCUTAneous Nightly    insulin lispro  0-12 Units SubCUTAneous TID WC    insulin lispro  0-6 Units SubCUTAneous Nightly    sodium chloride flush  10 mL IntraVENous 2 times per day    warfarin (COUMADIN) daily dosing (placeholder)   Other RX Placeholder    carvedilol  6.25 mg Oral BID WC     PRN Meds: glucose, dextrose, glucagon (rDNA), dextrose, perflutren lipid microspheres, sodium chloride flush, sodium chloride, senna, acetaminophen **OR** acetaminophen, potassium chloride **OR** potassium alternative oral replacement **OR** potassium chloride, magnesium sulfate, ondansetron **OR** ondansetron      Intake/Output Summary (Last 24 hours) at 9/2/2021 1539  Last data filed at 9/2/2021 1215  Gross per 24 hour   Intake 237.74 ml   Output 2195 ml   Net -1957.26 ml       Physical Exam Performed:  /75   Pulse 67   Temp 98.4 °F (36.9 °C) (Oral)   Resp 20   Ht 6' 3\" (1.905 m)   Wt 229 lb 3.2 oz (104 kg)   SpO2 95%   BMI 28.65 kg/m²     General appearance: Elderly male in no apparent distress, stated age, cooperative  Eyes: Sclera clear without conjunctival injection; PERRLA; EOMI  ENT: Mucous membranes moist without thrush; normal dentition  Neck: Supple; no goiter; no carotid bruit bilaterally  Cardiovascular: Regular rhythm without ectopy; normal S1-S2 with no murmurs; 2+ pitting BLE peripheral edema; no JVD  Respiratory:  Normal respiratory rate; bibasilar rales without wheezing or rhonchi; diminished breath sounds at bases  Gastrointestinal: Abdomen soft, non-tender, not distended; bowel sounds normal; no masses/organomegaly appreciated  Musculoskeletal: FROM spine and extremities x4; no gross deformity  Neurology: A&O x3; cranial nerves 2-12 grossly intact; motor 5/5  BUE/BLE; finger-to-nose/heel-to-shin intact; no pronator drift  Psychiatry: Well-groomed with good eye contact; appropriate affect; no visual/auditory hallucination  Skin: Warm, dry, normal turgor, no rash  PV: 2/4 radial and dorsalis pedis bilaterally; brisk capillary refill         Labs:   Recent Labs     09/01/21  1257 09/02/21  0451   WBC 8.0 8.2   HGB 11.8* 11.4*   HCT 37.7* 36.2*    318     Recent Labs     09/01/21  1257 09/02/21  0451    140   K 4.3 4.6   CL 98* 96*   CO2 27 32   BUN 36* 34*   CREATININE 1.6* 1.6*   CALCIUM 9.8 9.8     Recent Labs     09/01/21  1257   AST 18   ALT 6*   BILITOT 0.9   ALKPHOS 49     Recent Labs     09/01/21  1452 09/02/21  0451   INR 2.26* 2.28*     Recent Labs     09/01/21  2310 09/02/21  0451 09/02/21  1035   TROPONINI 0.06* 0.06* 0.06*       Urinalysis:   Lab Results   Component Value Date    NITRU Negative 09/02/2021    BLOODU Negative 09/02/2021    SPECGRAV 1.010 09/02/2021    GLUCOSEU Negative 09/02/2021       Radiology:  US RENAL COMPLETE   Final Result   No gross hydronephrosis. XR CHEST PORTABLE   Final Result   Cardiomegaly with pulmonary vascular congestion. No evidence of pulmonary   edema.   No radiographic findings of pneumonia                 Assessment/Plan:    Active Hospital Problems    Diagnosis Date Noted  Acute kidney injury superimposed on CKD ([facility] [N17.9, N18.9] 09/01/2021    Pulmonary vascular congestion [R09.89] 09/01/2021    HTN (hypertension) [I10] 09/01/2021    PAF (paroxysmal atrial fibrillation) ([facility] [I48.0] 09/01/2021    Acute decompensated heart failure ([place] [address].) [I50.9] 09/01/2021    DM2 (diabetes mellitus, type 2) ([place] [address] [E11.9] 11/15/2017    Elevated troponin I level [R77.8]     Chest pain with moderate risk of acute coronary syndrome [R07.9] 07/02/2016     1. Acute chest pain with Hx of CAD  -Admitted to [facility] with telemetry; serial troponin trended -flat  -Patient denies any chest pain currently. Continued home dose of aspirin, statin  -Cardiology consulted to assist with management. Echocardiogram ordered and pending  -Defer further ischemic work-up to cardiology. 2.  Acute on chronic combined systolic and diastolic CHF  -Initiated on IV Lasix GTT at 5 cc/h. -Reviewed last echocardiogram dated 5/28/2020 which showed LVEF 30 to 35% with diffuse hypokinesis. Grade 2 diastolic dysfunction.    -Strict I's/O monitoring and daily weights  -Follow on echocardiogram.    3.  Hypertension -optimal control on home medication; continue    4. OLIVIER-creatinine 1.6 on admission; baseline within normal limits.  -Concern for cardiorenal syndrome. -Nephrology consulted to assist with management especially in setting of IV diuresis. Monitor BMP  -Hold ARB in the setting of OLIVIER. 5.  Type 2 diabetes mellitus --check HbA1c; sliding scale insulin and diabetic diet. 6. PAF  -Patient currently rate controlled on Coreg and digoxin  -Continue Coumadin anticoagulation with consultation to pharmacy for dosing recommendations       DVT Prophylaxis: Coumadin   diet: ADULT DIET; Regular; 5 carb choices (75 gm/meal); Low Sodium (2 gm); 1800 ml  Code Status: Full Code    PT/OT Eval Status: Not yet ordered    Dispo -likely 2 to 3 days pending clinical improvement.        The note was completed using Dragon -speech recognition software & EMR  . Every effort was made to ensure accuracy; however, inadvertent computerized transcription errors may be present.     Robert Garduno MD

## 2021-09-02 NOTE — PLAN OF CARE
Up to Toilet/Shower, Up in Room, and Up in [address] [facility]? Nurse, PCA, PT, OT, and Self                 Level of Difficulty/Assistance: 2x Assist     Pt sitting in bed at this time on  2 L O2. Pt denies shortness of breath. Pt with pitting lower extremity edema.      Patient and/or Family's stated Goal of Care this Admission: reduce shortness of breath, increase activity tolerance, better understand heart failure and disease management, be more comfortable, and reduce lower extremity edema prior to discharge        :

## 2021-09-02 NOTE — CONSULTS
[address] FAILURE PROGRAM      Nicolle Forbes 1940    History:  Past Medical History:   Diagnosis Date    Acute MI ([facility] 2016    Cancer ([facility] 2002    non-hodgkins lymphoma    CHF (congestive heart failure) ([facility]     Diabetes mellitus ([facility]     GERD (gastroesophageal reflux disease)     Hyperlipidemia     Hypertension     Neuropathy        ECHO: 9-2-21  EF 25-30%    ACE/ARB: Losartan 25 mg daily-discontinued    Spironolactone 25mg daily-discontinued. BB: Carvedilol 6.25 mg BID    Last Hospital Admission: 9/5/2019      Discharge plans: plans to return home with wife. Family Present: wife  Advanced Directives: patient full code  Patient's stated goal of care: reduce lower extremity edema prior to discharge; long term goals include symptom management, sleep study  Lifestyle goal discussed: low sodium diet, fluid restrictions    Patient up in chair at this time on room air. Pt without oxygen for entirety of visit. SpO2 93% on room air. Pt denies shortness of breath; no complaints of chest pain. Pt states he lives at home with wife. Mentioned his diet largely consists of home cooked meals from wife, no salt. Pt states he drinks more than 60 ounces  of fluid per day, including water and coffee. States he takes all his home medications as prescribed. Patient has a scale at home. Patient denies concerns paying for medications. Patient recent weights and intake/output reviewed:    Patient Vitals for the past 96 hrs (Last 3 readings):   Weight   09/02/21 0428 229 lb 3.2 oz (104 kg)   09/01/21 1249 236 lb (107 kg)         Intake/Output Summary (Last 24 hours) at 9/2/2021 1541  Last data filed at 9/2/2021 1215  Gross per 24 hour   Intake 237.74 ml   Output 2195 ml   Net -1957.26 ml       Notified patient to call the doctor post discharge if he experiences shortness of breath, chest pain, swelling, cough, or weight gain of 2-3 pounds in a day / 5 pounds in a week.  Also notified patient to call the doctor if he feels dizzy, increased fatigue, decreased or difficulty urinating. Reviewed the red, yellow, green zones of heart failure self management. Encouraged patient to call the MD with early signs of an acute heart failure exacerbation or when in the yellow zone. Patient provided with both written and verbal education on CHF signs/symptoms, causes, discharge medications, daily weights, low sodium diet, activity, fluid restriction, and follow-up. Pt refuses teaching. No additional questions at this time. Stated he will alert his nurse with any questions. PATIENT/CAREGIVER TEACHING:    Level of patient/caregiver understanding able to:   [place]e ] Verbalize understanding [ ] Demonstrate understanding [ ] Teach back   [ ] Needs reinforcement [ ] Other:     Education Time: 15 min    Recommendations:   1. Encourage follow-up appointment compliance. Will need follow up appt in 7 days from discharge  2. Educate further on fluid restriction 48 oz - 64 oz during inpatient stay so he can understand how to measure intake at home. 3. Review sodium restrictions. Encourage patient to not add table salt to his foods and avoid foods that are high in sodium. 4. Continue to educate on S/S. Stress the importance of calling the MD with the earliest signs of an acute exacerbation. 5. Emphasize daily weights - instruct patient to call the MD if he gains 2-3 lb in a day or 5 lb in a week. 6. Provided patient with CHF Resource Line for questions and concerns.      Wade Chavez RN     9/2/2021 3:41 PM

## 2021-09-02 NOTE — CONSULTS
Nephrology Consult Note  [phone]   [email or web address].        Reason for Consult:  OLIVIER    HISTORY OF PRESENT ILLNESS:                The patient is a 80 y.o. male with significant past medical history of CAD, CHF, DMII and HTN who presents with SOB. SOB worse with exertion. Positive for orthopnea. SOB for last 3-4 weeks. Holding more water in abdomen. Some swelling in LE. He was having some CP but now better. On lasix drip. UO much better. SOB getting better. Denies nausea, vomiting, chest pain or headache. Past Medical History:        Diagnosis Date    Acute MI ([address] 2016    Cancer ([facility] 2002    non-hodgkins lymphoma    [facility]congestive [facility] failure) [facility]     Diabetes mellitus ([facility]     GERD (gastroesophageal reflux disease)     Hyperlipidemia     Hypertension     Neuropathy        Past Surgical History:        Procedure Laterality Date    APPENDECTOMY      CATARACT REMOVAL WITH IMPLANT Left 08/08/2019    CATARACT REMOVAL WITH IMPLANT Right 09/05/2019    [facility] EMULSIFICATION OF CATARACT WITH INTRAOCULAR LENS IMPLANT EYE (Right Eye) 9/5/19 w. Dr Poli Stark  10/7/13    polypectomy    CORONARY ANGIOPLASTY WITH STENT PLACEMENT      INTRACAPSULAR CATARACT EXTRACTION Left 8/8/2019    [facility] EMULSIFICATION OF CATARACT WITH INTRAOCULAR LENS IMPLANT EYE performed by Anson Merino MD at [address] Right 9/5/2019    [facility] EMULSIFICATION OF CATARACT WITH INTRAOCULAR LENS IMPLANT EYE performed by Anson Merino MD at [address] [facility] Left 04/05/2019    ICD with defib placed left side    PILONIDAL CYST EXCISION      SPLENECTOMY  2002    TUNNELED VENOUS PORT PLACEMENT      insertion and removal       Current Medications:    No current facility-administered medications on file prior to encounter.      Current Outpatient Medications on File Prior to Encounter   Medication Sig Dispense Refill     Not on file   Social History Narrative    Not on file     Social Determinants of Health     Financial Resource Strain:     Difficulty of Paying Living Expenses:    Food Insecurity:     Worried About Running Out of Food in the Last Year:     [address] in the Last Year:    Transportation Needs:     Lack of Transportation (Medical):  Lack of Transportation (Non-Medical):    Physical Activity:     Days of Exercise per Week:     Minutes of Exercise per Session:    Stress:     Feeling of Stress :    Social Connections:     Frequency of Communication with Friends and Family:     Frequency of Social Gatherings with Friends and Family:     Attends [employer] Services:     Active Member of Clubs or Organizations:     Attends Club or Organization Meetings:     Marital Status:    Intimate Partner Violence:     Fear of Current or Ex-Partner:     Emotionally Abused:     Physically Abused:     Sexually Abused:        Family History:   History reviewed. No pertinent family history. No one has renal disease    REVIEW OF SYSTEMS:    All other 12 ROS were negative except in HPI. PHYSICAL EXAM:    Vitals:    /85   Pulse 70   Temp 97.7 °F (36.5 °C) (Oral)   Resp 20   Ht 6' 3\" (1.905 m)   Wt 229 lb 3.2 oz (104 kg)   SpO2 96%   BMI 28.65 kg/m²   I/O last 3 completed shifts:  In: -   Out: 700 [Urine:700]  I/O this shift:  In: 37.7 [I.V.:37.7]  Out: 920 [Urine:920]    Physical Exam:  Gen: Resting in bed, NAD. HEENT: MMM, OP clear. CV: RRR no m/r/g. No S3.  Lungs: CTA-B  Abd: S/NT +BS  Ext: No edema, no cyanosis  Skin: Warm. No rashes appreciated. : No TTP over bladder, nondistended. Neuro: Alert and oriented x 3, nonfocal.  Joints: No erythema noted over joints.     DATA:      CBC:   Lab Results   Component Value Date    WBC 8.2 09/02/2021    RBC 4.40 09/02/2021    HGB 11.4 09/02/2021    HCT 36.2 09/02/2021    MCV 82.2 09/02/2021    MCH 25.9 09/02/2021    MCHC 31.5 09/02/2021    RDW 17.1 09/02/2021     09/02/2021    MPV 7.9 09/02/2021     CMP:    Lab Results   Component Value Date     09/02/2021    K 4.6 09/02/2021    K 4.3 09/01/2021    CL 96 09/02/2021    CO2 32 09/02/2021    BUN 34 09/02/2021    CREATININE 1.6 09/02/2021    GFRAA 51 09/02/2021    AGRATIO 1.1 09/01/2021    LABGLOM 42 09/02/2021    GLUCOSE 111 09/02/2021    PROT 7.9 09/01/2021    LABALBU 4.1 09/01/2021    CALCIUM 9.8 09/02/2021    BILITOT 0.9 09/01/2021    ALKPHOS 49 09/01/2021    AST 18 09/01/2021    ALT 6 09/01/2021     Magnesium:    Lab Results   Component Value Date    MG 1.80 09/02/2021     Phosphorus:  No results found for: PHOS    IMPRESSION/RECOMMENDATIONS:      - OLIVIER: most likely cardiorenal + losartan. Will do urine studies. Will do renal US. Agree with lasix drip. Will consider changing lasix drip to torsemide 20 mg daily tomorrow. Monitor closely. Baseline sr cr 1.0. Sr cr 1.6-> 1.6.     - Acute on chronic systolic CHF (EF 02%): on lasix drip. See above. Card consulted. Need daily weight. Will discuss titration of diuretic at the time of discharge.    - DMII: as per primary    - Dyspnea: due to pulmonary congestion + CHF. Reviewed chest x ray. Getting better with diuresis    - HTN: fairly controlled. - A fib    Thank you for allowing me to participate in the care of this patient. I will continue to follow along. Please call with questions.     Orlin Woodruff MD, MD, FASN, [address], [address]  9/2/2021

## 2021-09-02 NOTE — PROGRESS NOTES
Physical Therapy    Facility/Department: [facility] A2 CARD TELEMETRY  Initial Assessment    NAME: Brian Fry  : 1940  MRN: [id number]    Date of Service: 2021    Discharge Recommendations:  Home with Home health PT, 24 hour supervision or assist   PT Equipment Recommendations  Equipment Needed: No    Assessment   Body structures, Functions, Activity limitations: Decreased functional mobility ; Decreased balance;Decreased posture;Decreased endurance  Assessment: Pt is 81 yo male who presents with diagnosis of acute on chronic CHF. Pt mod I with RW at baseline. Grossly SBA to CGA for mobility with RW this date. Minor posterior LOB that pt able to self correct. Tolerated therapy well. Pt would benefit from continued skilled therapy to address deficits. Recommend home with 24-hr sup and home PT at d/c. Treatment Diagnosis: impaired functional mobility  Specific instructions for Next Treatment: progress mobility as tolerated  Prognosis: Good  Decision Making: Low Complexity  PT Education: Goals; General Safety;Gait Training;PT Role;Disease Specific Education;Plan of Care; Functional Mobility Training;Home Exercise Program;Transfer Training  Disease Specific Education: Pt educated on importance of OOB mobility, prevention of complications of bedrest, and general safety during hospitalization. Pt verbalized understanding  Barriers to Learning: none  REQUIRES PT FOLLOW UP: Yes  Activity Tolerance  Activity Tolerance: Patient Tolerated treatment well;Patient limited by endurance  Activity Tolerance: /85, HR 78; SpO2 98% on 2L       Patient Diagnosis(es): The primary encounter diagnosis was Chest pain, unspecified type. A diagnosis of Acute on chronic systolic congestive heart failure (HCC) was also pertinent to this visit.      has a past medical history of Acute MI ([facility] Cancer ([facility] CHF (congestive heart failure) ([facility] Diabetes mellitus ([facility] GERD (gastroesophageal reflux disease), Hyperlipidemia, Hypertension, and Neuropathy. has a past surgical history that includes Splenectomy (2002); Cholecystectomy; Appendectomy; Pilonidal cyst excision; Tunneled venous port placement; Colonoscopy; Colonoscopy (10/7/13); Coronary angioplasty with stent; other surgical history (Left, 04/05/2019); Cataract removal with implant (Left, 08/08/2019); Intracapsular cataract extraction (Left, 8/8/2019); Cataract removal with implant (Right, 09/05/2019); and Intracapsular cataract extraction (Right, 9/5/2019).     Restrictions  Restrictions/Precautions  Restrictions/Precautions: General Precautions, Fall Risk  Position Activity Restriction  Other position/activity restrictions: BR with BSC, IV, tele  Vision/Hearing  Vision: Impaired  Vision Exceptions: Wears glasses for reading  Hearing: Exceptions to [facility]  menuvox Exceptions: Hard of hearing/hearing concerns     Subjective  General  Chart Reviewed: Yes  Patient assessed for rehabilitation services?: Yes  Response To Previous Treatment: Not applicable  Family / Caregiver Present: No  Referring Practitioner: Dr. Arun Garcia MD  Referral Date : 09/02/21  Diagnosis: Acute on Chronic systolic CHF  Follows Commands: Within Functional Limits  General Comment  Comments: Pt resting in bed on approach; RN cleared pt for therapy  Subjective  Subjective: pt agreeable to therapy  Pain Screening  Patient Currently in Pain: Denies    Orientation  Orientation  Overall Orientation Status: Within Functional Limits  Social/Functional History  Social/Functional History  Lives With: Spouse  Type of Home:  (condo)  Home Layout: One level  Home Access: Level entry  Bathroom Shower/Tub: Walk-in shower (small threshold into shower)  Bathroom Toilet: Handicap height  Bathroom Equipment: Grab bars in shower, Shower chair  Home Equipment: 4 wheeled walker, Rolling walker  ADL Assistance: Independent  Homemaking Responsibilities: No (wife completes)  Ambulation Assistance: Independent (with RW)  Transfer Assistance: Independent  Active : No  Patient's  Info: wife  Occupation: Retired  Type of occupation: computers  Leisure & Hobbies: watch TV  Additional Comments: 1 fall in past year d/t legs giving out    Objective     RLE AROM: WFL  LLE AROM : WFL  Strength RLE: WFL  Strength LLE: WFL     Sensation  Overall Sensation Status: Impaired (baseline neutropathy BLE)     Bed mobility  Supine to Sit: Stand by assistance (HOB elevated, use of rails)  Sit to Supine: Unable to assess (pt up in chair at end of session)     Transfers  Sit to Stand: Contact guard assistance (from EOB x 2 reps to RW with cues for safe hand placement)  Stand to sit: Contact guard assistance     Ambulation  Ambulation?: Yes  Ambulation 1  Surface: level tile  Device: Rolling Walker  Assistance: Contact guard assistance  Quality of Gait: Cues for posture and sequencing. Cues to maintain CHRISTIANO within RW. Mild LOB posteriorly walking up to sink. Able to self correct. Gait Deviations: Shuffles; Slow Juliana;Decreased step length;Decreased step height  Distance: 15 ft + 25 ft     Balance  Sitting - Static: Good  Sitting - Dynamic: Good  Standing - Static: Fair;+  Standing - Dynamic: Fair  Comments: Posterior LOB standing at sink, able to self correct     Exercises  Gluteal Sets: x 5 BLE  Hip Flexion: Seated marches x 5 BLE  Hip Abduction: Seated clamshell x 5 BLE  Knee Long Arc Quad: x 5 BLE  Ankle Pumps: x 10 BLE  Comments: cues for technique     Plan   Plan  Times per week: 3-5x/wk  Times per day: Daily  Specific instructions for Next Treatment: progress mobility as tolerated  Current Treatment Recommendations: Strengthening, Neuromuscular Re-education, Home Exercise Program, Safety Education & Training, Balance Training, Endurance Training, Functional Mobility Training, Transfer Training, Cognitive Reorientation, Equipment Evaluation, Education, & procurement, Patient/Caregiver Education & Training  Safety Devices  Type of devices:  All

## 2021-09-02 NOTE — ED PROVIDER NOTES
[facility] Emergency Department      CHIEF COMPLAINT  Shortness of Breath (started a couple days ago, ) and Chest Pain (started a couple days ago, took two of nitro, hx of chf, )      HISTORY OF PRESENT ILLNESS  Michael Hernandez is a 80 y.o. male with a history of coronary artery disease and congestive heart failure presents with shortness of breath on exertion and when supine. He also has some mild chest tightness. He states this has been ongoing for the past several days. He states about a month ago his potassium level was too elevated so he was taken off of his Aldactone. He is on Lasix but states he feels like that is not helping. He takes 40 mg once daily but actually doubled up on this dose a couple days but it did not help. His wife states that at home she has checked his oxygen sats after he is up walking around and this morning his oxygen dropped to 85%. No other complaints, modifying factors or associated symptoms. I have reviewed the following from the nursing documentation. Past Medical History:   Diagnosis Date    Acute MI ([address] 2016    Cancer ([address] 2002    non-hodgkins lymphoma    CHF (congestive heart failure) ([address]     Diabetes mellitus ([address]     GERD (gastroesophageal reflux disease)     Hyperlipidemia     Hypertension     Neuropathy      Past Surgical History:   Procedure Laterality Date    APPENDECTOMY      CATARACT REMOVAL WITH IMPLANT Left 08/08/2019    CATARACT REMOVAL WITH IMPLANT Right 09/05/2019    [facility] EMULSIFICATION OF CATARACT WITH INTRAOCULAR LENS IMPLANT EYE (Right Eye) 9/5/19 w.  Dr [place] Carmona  10/7/13    polypectomy    CORONARY ANGIOPLASTY WITH STENT PLACEMENT      INTRACAPSULAR CATARACT EXTRACTION Left 8/8/2019    [facility] EMULSIFICATION OF CATARACT WITH INTRAOCULAR LENS IMPLANT EYE performed by Cuco Rodriguez MD at [place] [address] Right 9/5/2019    [facility] EMULSIFICATION OF CATARACT WITH INTRAOCULAR LENS IMPLANT EYE performed by Jon Moeller MD at [facility] Left 2019    ICD with defib placed left side    PILONIDAL CYST EXCISION      SPLENECTOMY  2002    TUNNELED VENOUS PORT PLACEMENT      insertion and removal     History reviewed. No pertinent family history. Social History     Socioeconomic History    Marital status:      Spouse name: Not on file    Number of children: Not on file    Years of education: Not on file    Highest education level: Not on file   Occupational History    Not on file   Tobacco Use    Smoking status: Former Smoker     Quit date: 10/7/1974     Years since quittin.9    Smokeless tobacco: Never Used   Vaping Use    Vaping Use: Never assessed   Substance and Sexual Activity    Alcohol use: No    Drug use: No    Sexual activity: Not on file   Other Topics Concern    Not on file   Social History Narrative    Not on file     Social Determinants of Health     Financial Resource Strain:     Difficulty of Paying Living Expenses:    Food Insecurity:     Worried About [address] in the Last Year:     [address] [place] in the Last Year:    Transportation Needs:     Lack of Transportation (Medical):      Lack of Transportation (Non-Medical):    Physical Activity:     Days of Exercise per Week:     Minutes of Exercise per Session:    Stress:     Feeling of Stress :    Social Connections:     Frequency of Communication with Friends and Family:     Frequency of Social Gatherings with Friends and Family:     Attends Rastafarian Services:     Active Member of Clubs or Organizations:     Attends Club or Organization Meetings:     Marital Status:    Intimate Partner Violence:     Fear of Current or Ex-Partner:     Emotionally Abused:     Physically Abused:     Sexually Abused:      Current Facility-Administered Medications   Medication Dose Route Frequency Provider Last Rate Last Admin    warfarin (COUMADIN) tablet 1.5 mg  1.5 mg Oral Once Chris Mayers MD        torsemide [facility]) tablet 20 mg  20 mg Oral Daily Tala Chapin MD   20 mg at 09/03/21 1045    citalopram (CELEXA) tablet 20 mg  20 mg Oral Daily Chris Mayers MD   20 mg at 09/03/21 0853    digoxin (LANOXIN) tablet 125 mcg  125 mcg Oral Daily Chris Mayers MD   125 mcg at 09/03/21 0853    pantoprazole (PROTONIX) tablet 40 mg  40 mg Oral QAM AC Chris Mayers MD   40 mg at 09/03/21 0616    pravastatin (PRAVACHOL) tablet 40 mg  40 mg Oral Nightly Chris Mayers MD   40 mg at 09/02/21 2110    aspirin EC tablet 81 mg  81 mg Oral Daily Chris Mayers MD   81 mg at 09/03/21 0853    insulin lispro (HUMALOG) injection vial 0-12 Units  0-12 Units SubCUTAneous TID WC Chris Mayers MD   2 Units at 09/03/21 1152    insulin lispro (HUMALOG) injection vial 0-6 Units  0-6 Units SubCUTAneous Nightly Chris Mayers MD        glucose (GLUTOSE) 40 % oral gel 15 g  15 g Oral PRN Chris Mayers MD        dextrose 50 % IV solution  12.5 g IntraVENous PRN Chris Mayers MD        glucagon (rDNA) injection 1 mg  1 mg IntraMUSCular PRN Chris Mayers MD        dextrose 5 % solution  100 mL/hr IntraVENous PRN Chris Mayers MD        perflutren lipid microspheres (DEFINITY) injection 1.65 mg  1.5 mL IntraVENous ONCE PRN Chris Mayers MD        sodium chloride flush 0.9 % injection 10 mL  10 mL IntraVENous 2 times per day Chris Mayers MD        sodium chloride flush 0.9 % injection 10 mL  10 mL IntraVENous PRN Chris Mayers MD        0.9 % sodium chloride infusion  25 mL IntraVENous PRN Chris Mayers MD        senna (SENOKOT) tablet 8.6 mg  1 tablet Oral Daily PRN Chris Mayers MD        acetaminophen (TYLENOL) tablet 650 mg  650 mg Oral Q6H PRN Chris Mayers MD        Or   Vasquez acetaminophen (TYLENOL) suppository 650 mg  650 mg Rectal Q6H PRN Chris Mayers MD        potassium chloride (KLOR-CON M) extended release tablet 40 mEq  40 mEq Oral PRN Tim Stubbs MD        Or    potassium bicarb-citric acid (EFFER-K) effervescent tablet 40 mEq  40 mEq Oral PRN Tim Stubbs MD        Or    potassium chloride 10 mEq/100 mL IVPB (Peripheral Line)  10 mEq IntraVENous PRN Tim Stubbs MD        magnesium sulfate 2000 mg in 50 mL IVPB premix  2,000 mg IntraVENous PRN Tim Stubbs MD        ondansetron (ZOFRAN-ODT) disintegrating tablet 4 mg  4 mg Oral Q8H PRN Tim Stubbs MD        Or    [facility]ron [facility] injection 4 mg  4 mg IntraVENous Q6H PRN Tim Stubbs MD        warfarin (COUMADIN) daily dosing (placeholder)   Other RX Placeholder Tim Stubbs MD        carvedilol (COREG) tablet 6.25 mg  6.25 mg Oral BID  Tim Stubbs MD   6.25 mg at 09/03/21 3351     Allergies   Allergen Reactions    Diclofenac Sodium Anaphylaxis     Anaphylaxis entered at [facility], date unknown. Patient currently takes Aspirin, tolerates oral Naproxen (2016) and BromSite (bromfenac) eye drops (2019).  Kiwi Extract Anaphylaxis    Adhesive Tape     Lipitor     Lisinopril     Lorazepam     Zocor [Simvastatin]        REVIEW OF SYSTEMS  10 systems reviewed, pertinent positives per HPI otherwise noted to be negative. PHYSICAL EXAM  /68   Pulse 70   Temp 98.1 °F (36.7 °C) (Oral)   Resp 16   Ht 6' 3\" (1.905 m)   Wt 221 lb 8 oz (100.5 kg)   SpO2 94%   BMI 27.69 kg/m²   GENERAL APPEARANCE: Awake and alert. Cooperative. No acute distress. HEAD: Normocephalic. Atraumatic. EYES: PERRL. EOM's grossly intact. ENT: Mucous membranes are moist.   NECK: Supple, trachea midline. HEART: RRR. Pacemaker left chest wall  LUNGS: Diminished breath sounds at bilateral bases. ABDOMEN: Soft. Non-distended. Non-tender. No guarding or rebound. EXTREMITIES: Trace lower extremity edema bilaterally. MAEE. No acute deformities. SKIN: Warm, dry and intact. No acute rashes.    NEUROLOGICAL: Alert and oriented X 3. CN II-XII grossly intact. Strength 5/5, sensation intact. Normal coordination. PSYCHIATRIC: Normal mood and affect. LABS  I have reviewed all labs for this visit.    Results for orders placed or performed during the hospital encounter of 09/01/21   CBC Auto Differential   Result Value Ref Range    WBC 8.0 4.0 - 11.0 K/uL    RBC 4.52 4.20 - 5.90 M/uL    Hemoglobin 11.8 (L) 13.5 - 17.5 g/dL    Hematocrit 37.7 (L) 40.5 - 52.5 %    MCV 83.4 80.0 - 100.0 fL    MCH 26.0 26.0 - 34.0 pg    MCHC 31.1 31.0 - 36.0 g/dL    RDW 16.8 (H) 12.4 - 15.4 %    Platelets 414 719 - 025 K/uL    MPV 8.7 5.0 - 10.5 fL    Neutrophils % 62.7 %    Lymphocytes % 23.7 %    Monocytes % 6.0 %    Eosinophils % 5.1 %    Basophils % 2.5 %    Neutrophils Absolute 5.0 1.7 - 7.7 K/uL    Lymphocytes Absolute 1.9 1.0 - 5.1 K/uL    Monocytes Absolute 0.5 0.0 - 1.3 K/uL    Eosinophils Absolute 0.4 0.0 - 0.6 K/uL    Basophils Absolute 0.2 0.0 - 0.2 K/uL   Comprehensive Metabolic Panel w/ Reflex to MG   Result Value Ref Range    Sodium 138 136 - 145 mmol/L    Potassium reflex Magnesium 4.3 3.5 - 5.1 mmol/L    Chloride 98 (L) 99 - 110 mmol/L    CO2 27 21 - 32 mmol/L    Anion Gap 13 3 - 16    Glucose 103 (H) 70 - 99 mg/dL    BUN 36 (H) 7 - 20 mg/dL    CREATININE 1.6 (H) 0.8 - 1.3 mg/dL    GFR Non-African American 42 (A) >60    GFR  51 (A) >60    Calcium 9.8 8.3 - 10.6 mg/dL    Total Protein 7.9 6.4 - 8.2 g/dL    Albumin 4.1 3.4 - 5.0 g/dL    Albumin/Globulin Ratio 1.1 1.1 - 2.2    Total Bilirubin 0.9 0.0 - 1.0 mg/dL    Alkaline Phosphatase 49 40 - 129 U/L    ALT 6 (L) 10 - 40 U/L    AST 18 15 - 37 U/L    Globulin 3.8 g/dL   Brain Natriuretic Peptide   Result Value Ref Range    Pro-BNP 9,814 (H) 0 - 449 pg/mL   Troponin   Result Value Ref Range    Troponin 0.06 (H) <0.01 ng/mL   APTT   Result Value Ref Range    aPTT 35.5 26.2 - 38.6 sec   Protime-INR   Result Value Ref Range    Protime 26.4 (H) 9.9 - 12.7 sec    INR 2.26 (H) 0.88 - 1.12   Hemoglobin A1c   Result Value Ref Range    Hemoglobin A1C 6.6 See comment %    eAG 142.7 mg/dL   TSH without Reflex   Result Value Ref Range    TSH 1.68 0.27 - 4.20 uIU/mL   T4, free   Result Value Ref Range    T4 Free 1.4 0.9 - 1.8 ng/dL   Iron and TIBC   Result Value Ref Range    Iron 37 (L) 59 - 158 ug/dL    TIBC 439 260 - 445 ug/dL    Iron Saturation 8 (L) 20 - 50 %   Digoxin level   Result Value Ref Range    Digoxin Lvl 0.7 (L) 0.8 - 2.0 ng/mL   Troponin   Result Value Ref Range    Troponin 0.06 (H) <0.01 ng/mL   Troponin   Result Value Ref Range    Troponin 0.06 (H) <0.01 ng/mL   Troponin   Result Value Ref Range    Troponin 0.06 (H) <0.01 ng/mL   Protime-INR   Result Value Ref Range    Protime 26.7 (H) 9.9 - 12.7 sec    INR 2.28 (H) 0.88 - 1.12   Hemoglobin A1c   Result Value Ref Range    Hemoglobin A1C 6.5 See comment %    eAG 139.9 mg/dL   Basic Metabolic Panel   Result Value Ref Range    Sodium 140 136 - 145 mmol/L    Potassium 4.6 3.5 - 5.1 mmol/L    Chloride 96 (L) 99 - 110 mmol/L    CO2 32 21 - 32 mmol/L    Anion Gap 12 3 - 16    Glucose 111 (H) 70 - 99 mg/dL    BUN 34 (H) 7 - 20 mg/dL    CREATININE 1.6 (H) 0.8 - 1.3 mg/dL    GFR Non-African American 42 (A) >60    GFR  51 (A) >60    Calcium 9.8 8.3 - 10.6 mg/dL   Lipid Panel   Result Value Ref Range    Cholesterol, Total 113 0 - 199 mg/dL    Triglycerides 115 0 - 150 mg/dL    HDL 34 (L) 40 - 60 mg/dL    LDL Calculated 56 <100 mg/dL    VLDL Cholesterol Calculated 23 Not Established mg/dL   Magnesium   Result Value Ref Range    Magnesium 1.80 1.80 - 2.40 mg/dL   CBC Auto Differential   Result Value Ref Range    WBC 8.2 4.0 - 11.0 K/uL    RBC 4.40 4.20 - 5.90 M/uL    Hemoglobin 11.4 (L) 13.5 - 17.5 g/dL    Hematocrit 36.2 (L) 40.5 - 52.5 %    MCV 82.2 80.0 - 100.0 fL    MCH 25.9 (L) 26.0 - 34.0 pg    MCHC 31.5 31.0 - 36.0 g/dL    RDW 17.1 (H) 12.4 - 15.4 %    Platelets 739 140 - 850 K/uL    MPV 7.9 5.0 - 10.5 fL Neutrophils % 73.2 %    Lymphocytes % 18.6 %    Monocytes % 4.4 %    Eosinophils % 3.8 %    Basophils % 0.0 %    Neutrophils Absolute 6.0 1.7 - 7.7 K/uL    Lymphocytes Absolute 1.5 1.0 - 5.1 K/uL    Monocytes Absolute 0.4 0.0 - 1.3 K/uL    Eosinophils Absolute 0.3 0.0 - 0.6 K/uL    Basophils Absolute 0.0 0.0 - 0.2 K/uL   Protein, urine, random   Result Value Ref Range    Protein, Ur 7.00 <12 mg/dL   Urinalysis   Result Value Ref Range    Color, UA Yellow Straw/Yellow    Clarity, UA Clear Clear    Glucose, Ur Negative Negative mg/dL    Bilirubin Urine Negative Negative    Ketones, Urine Negative Negative mg/dL    Specific Gravity, UA 1.010 1.005 - 1.030    Blood, Urine Negative Negative    pH, UA 7.5 5.0 - 8.0    Protein, UA Negative Negative mg/dL    Urobilinogen, Urine 1.0 <2.0 E.U./dL    Nitrite, Urine Negative Negative    Leukocyte Esterase, Urine Negative Negative    Microscopic Examination Not Indicated     Urine Type NotGiven    Microalbumin / Creatinine Urine Ratio   Result Value Ref Range    Microalbumin, Random Urine 3.70 (H) <2.0 mg/dL    Creatinine, Ur 19.5 (L) 39.0 - 259.0 mg/dL    Microalbumin Creatinine Ratio 189.7 (H) 0.0 - 30.0 mg/g   Protime-INR   Result Value Ref Range    Protime 33.1 (H) 9.9 - 12.7 sec    INR 2.81 (H) 0.88 - 1.12   Magnesium   Result Value Ref Range    Magnesium 1.90 1.80 - 2.40 mg/dL   Basic Metabolic Panel   Result Value Ref Range    Sodium 140 136 - 145 mmol/L    Potassium 3.8 3.5 - 5.1 mmol/L    Chloride 94 (L) 99 - 110 mmol/L    CO2 36 (H) 21 - 32 mmol/L    Anion Gap 10 3 - 16    Glucose 136 (H) 70 - 99 mg/dL    BUN 31 (H) 7 - 20 mg/dL    CREATININE 1.6 (H) 0.8 - 1.3 mg/dL    GFR Non-African American 42 (A) >60    GFR  51 (A) >60    Calcium 9.4 8.3 - 10.6 mg/dL   POCT Glucose   Result Value Ref Range    POC Glucose 111 (H) 70 - 99 mg/dl    Performed on ACCU-CHEK    POCT Glucose   Result Value Ref Range    POC Glucose 119 (H) 70 - 99 mg/dl    Performed on ACCU-CHEK    POCT Glucose   Result Value Ref Range    POC Glucose 140 (H) 70 - 99 mg/dl    Performed on ACCU-CHEK    POCT Glucose   Result Value Ref Range    POC Glucose 134 (H) 70 - 99 mg/dl    Performed on ACCU-CHEK    POCT Glucose   Result Value Ref Range    POC Glucose 132 (H) 70 - 99 mg/dl    Performed on ACCU-CHEK    POCT Glucose   Result Value Ref Range    POC Glucose 149 (H) 70 - 99 mg/dl    Performed on ACCU-CHEK    EKG 12 Lead   Result Value Ref Range    Ventricular Rate 74 BPM    Atrial Rate 50 BPM    QRS Duration 156 ms    Q-T Interval 454 ms    QTc Calculation (Bazett) 503 ms    R Axis 243 degrees    T Axis 74 degrees    Diagnosis       Electronic ventricular pacemakerConfirmed by GURPREET Nuñez MD ([id number]) on 9/1/2021 3:26:45 PM       EKG  The Ekg interpreted by myself  normal pacemaker rhythm with a rate of 74  Axis is   Normal  QTc is  normal  Intervals and Durations are unremarkable. No specific ST-T wave changes appreciated. No evidence of acute ischemia. No significant change from prior EKG dated 8/8/19    Cardiac Monitoring: The cardiac monitor revealed a paced rhythm as interpreted by me. The cardiac monitor was ordered secondary to the patient's complaint of shortness of breath and chest pain and to monitor the patient for dysrhythmia. RADIOLOGY  X-RAYS:  I have reviewed radiologic plain film image(s). ALL OTHER NON-PLAIN FILM IMAGES SUCH AS CT, ULTRASOUND AND MRI HAVE BEEN READ BY THE RADIOLOGIST. US RENAL COMPLETE   Final Result   No gross hydronephrosis. XR CHEST PORTABLE   Final Result   Cardiomegaly with pulmonary vascular congestion. No evidence of pulmonary   edema. No radiographic findings of pneumonia                    Rechecks: Physical assessment performed. The patient has started diuresing after IV Lasix. He is currently chest pain-free.         Critical Care:I personally spent a total of 45 minutes of critical care time in obtaining history, performing a physical exam, bedside monitoring of interventions, collecting and interpreting tests and discussion with consultants but excluding time spent performing procedures, treating other patients and teaching time. ED COURSE/MDM  Patient seen and evaluated. Here the patient is afebrile with normal vitals signs, except his oxygenation is 91% on room air on arrival at rest.  He is in a paced rhythm, EKG shows paced rhythm with no clear ischemic changes. Troponin is 0.06. He has been chronically elevated in the past.  BNP is elevated greater than 9000. The remainder of his lab work appears to be baseline. Chest x-ray does show evidence of fluid overload. I have given him IV Lasix here and aspirin. I will admit him to the hospitalist for further diuresis and work-up. . Old records reviewed. Labs and imaging reviewed and results discussed with patient. Patient was reassessed as noted above . Plan of care discussed with patient. Patient in agreement with plan. CLINICAL IMPRESSION  1. Chest pain, unspecified type    2. Acute on chronic systolic congestive heart failure (HCC)        Blood pressure 113/68, pulse 70, temperature 98.1 °F (36.7 °C), temperature source Oral, resp. rate 16, height 6' 3\" (1.905 m), weight 221 lb 8 oz (100.5 kg), SpO2 94 %. DISPOSITION  Billy Lewis was admitted in stable condition.     (Please note this note was completed with a voice recognition program.  Efforts were made to edit the dictations but occasionally words are mis-transcribed.)        Nikki Butcher MD  09/03/21 0278

## 2021-09-02 NOTE — CARE COORDINATION
CASE MANAGEMENT INITIAL ASSESSMENT      Reviewed chart and completed assessment with:patient  Explained Case Management role/services. Primary contact information:see below    Health Care Decision Maker :   Primary Decision Maker: Julio C Nicole - [phone]    Secondary Decision Maker: Monica Simon - [phone]          Can this person be reached and be able to respond quickly, such as within a few minutes or hours? Yes    Admit date/status:09/01/2021  Diagnosis:Heart failure, systolic, with acute decompensation   Is this a Readmission?:  No      Insurance:Medicare   Precert required for SNF: No       3 night stay required: waived    Living arrangements, Adls, care needs, prior to admission:Pt lives in ranch home w/0STE w/spouse. Spouse assists as needed w/ADL's    Transportation:private     Durable Medical Equipment at home:  Walker_4WW_Cane__RTS__ BSC__Shower Chair__  02__ HHN__ CPAP__  BiPap__  Hospital Bed__ W/C___ Other_grab bars, high toilet_________    Services in the home and/or outpatient, prior to [address] (if applicable)   · Name:  · Address:  · Dialysis Schedule:  · Phone:  · Fax:    PT/OT recs:none    Hospital Exemption Notification (HEN):not initiated    Barriers to discharge:none    Plan/comments:Pt lives w/spouse w/plans to d/c back home;  Spouse will transport. Pt requesting nocturnal O2, and is concerned about going to facility for study, given limited mobility. Pt and spouse requesting in home study, if possible. Pt is open to OP PT/OT therapy. CM will continue to follow for needs.   Sonya Begum RN       [facility] on chart for MD signature

## 2021-09-02 NOTE — PROGRESS NOTES
Pharmacy Note  Warfarin Consult  Dx: Afib  Goal INR range 2-3   Home Warfarin dose: 5 mg every M-W-Sat. 2.5 mg all other days    Date  INR  Warfarin_  9/1                  2.26                    5 mg  9/2                  2.28                    2.5 mg      Recommend Warfarin 2.5 mg tonight x1. Daily INR ordered. Rx will continue to manage therapy per consult order.     Olvin Decker, PharmD    9/2/2021 8:45 AM

## 2021-09-02 NOTE — PROGRESS NOTES
/77   Pulse 70   Temp 98 °F (36.7 °C) (Oral)   Resp 16   Ht 6' 3\" (1.905 m)   Wt 229 lb 3.2 oz (104 kg)   SpO2 96%   BMI 28.65 kg/m²     Admitted patient to room 0202 from ED with dx: CHF. Patient is alert and oriented, respirations easy and unlabored. Oriented to room, call light, tv, phone and dietary services. Bed in lowest position and locked. Exit alarms in place. Non slip socks on. ID bracelet on and correct per patient verbally reporting name and date of birth. Call light and needed items in reach.

## 2021-09-02 NOTE — PROGRESS NOTES
within reach           Patient Diagnosis(es): The primary encounter diagnosis was Chest pain, unspecified type. A diagnosis of Acute on chronic systolic congestive heart failure (HCC) was also pertinent to this visit. has a past medical history of Acute MI ([facility] [address] Cancer ([facility] [address] CHF (congestive heart failure) ([facility] [address] Diabetes mellitus ([facility] [address] GERD (gastroesophageal reflux disease), Hyperlipidemia, Hypertension, and Neuropathy. has a past surgical history that includes Splenectomy (2002); Cholecystectomy; Appendectomy; Pilonidal cyst excision; Tunneled venous port placement; Colonoscopy; Colonoscopy (10/7/13); Coronary angioplasty with stent; other surgical history (Left, 04/05/2019); Cataract removal with implant (Left, 08/08/2019); Intracapsular cataract extraction (Left, 8/8/2019); Cataract removal with implant (Right, 09/05/2019); and Intracapsular cataract extraction (Right, 9/5/2019).            Restrictions  Restrictions/Precautions  Restrictions/Precautions: General Precautions, Fall Risk  Position Activity Restriction  Other position/activity restrictions: BR with BSC, IV, tele    Subjective   General  Chart Reviewed: Yes  Patient assessed for rehabilitation services?: Yes  Family / Caregiver Present: Yes (daughter)  Subjective  Subjective: Pt pleasant and agreeable to therapy  General Comment  Comments: RN approved therapy  Patient Currently in Pain: Denies  Vital Signs  Temp: 98.4 °F (36.9 °C)  Temp Source: Oral  Pulse: 67  Heart Rate Source: Monitor  Resp: 20  BP: 125/75  Patient Position: Sitting;Up in chair  Patient Currently in Pain: Denies  Oxygen Therapy  SpO2: 95 %  O2 Device: Nasal cannula  O2 Flow Rate (L/min): 2 L/min     Social/Functional History  Social/Functional History  Lives With: Spouse  Type of Home:  (condo)  Home Layout: One level  Home Access: Level entry  Bathroom Shower/Tub: Walk-in shower (small threshold into shower)  Bathroom Toilet: Handicap height  Bathroom Equipment: Grab bars in shower, Shower chair  Home Equipment: 4 wheeled walker, Rolling walker  ADL Assistance: Toya Urbanna Responsibilities: No (wife completes)  Ambulation Assistance: Independent (with RW)  Transfer Assistance: Independent  Active : No  Patient's  Info: wife  Occupation: Retired  Type of occupation: computers  Leisure & Hobbies: watch TV  Additional Comments: 1 fall in past year d/t legs giving out       Objective   Vision: Impaired  Vision Exceptions: Wears glasses for reading  H[facility]: Exceptions to [facility]  Hearing Exceptions: Hard of hearing/hearing concerns    Orientation  Overall Orientation Status: Within Functional Limits  Observation/Palpation  Posture: Fair  Observation: kyphotic posture in standing  Balance  Sitting Balance: Stand by assistance  Standing Balance: Minimal assistance  Standing Balance  Time: ~10 minutes  Activity: standing ADLs, bathroom mobility, room mobility in preparation for functional distances  Comment: Pt demo LOB x1 standing at sink, required A to recover. RW used  Functional Mobility  Functional - Mobility Device: Rolling Walker  Activity: To/From therapy gym; Other (room mobility in preparation for functional distances)  Assist Level: Minimal assistance  Functional Mobility Comments: Cueing required for safety awareness with mobility pacing and sequencing with RW  ADL  Grooming: Contact guard assistance (standing at sink for oral care)  LE Bathing: Contact guard assistance (Standing at sink)  UE Dressing: Minimal assistance (donning gown. Pt able to A with button required help with IV line managment)  Toileting: Supervision (Semi-fowlers in bed upon pt request)  Additional Comments: Pt declined additional ADLs at this time  Tone RUE  RUE Tone: Normotonic  Tone LUE  LUE Tone: Normotonic  Coordination  Movements Are Fluid And Coordinated: Yes     Bed mobility  Supine to Sit:  (HOB elevated, bed rails used.  Required rest break sitting EOB for SOB)  Sit to Supine: Unable to assess (pt in chair at end of session)  Transfers  Sit to stand: Minimal assistance;Contact guard assistance  Stand to sit: Minimal assistance;Contact guard assistance  Transfer Comments: Cueing for hand placement and safety awareness with RW sequencing     Cognition  Overall Cognitive Status: Exceptions  Arousal/Alertness: Appropriate responses to stimuli  Following Commands: Follows one step commands with repetition  Attention Span: Attends with cues to redirect  Memory: Decreased recall of recent events  Safety Judgement: Decreased awareness of need for safety  Problem Solving: Decreased awareness of errors  Insights: Decreased awareness of deficits  Initiation: Requires cues for some  Sequencing: Requires cues for some        Sensation  Overall Sensation Status: Impaired (baseline neutropathy BLE)  Type of ROM/Therapeutic Exercise  Type of ROM/Therapeutic Exercise: AROM  Comment: Pt and family educated on UE exercises for improve strength, endurance, and edema control. Pt demoed understanding. Exercises  Elbow Flexion: x5  Elbow Extension: x5  Wrist Flexion: x5  Wrist Extension: x5  Grasp/Release: x5     LUE AROM (degrees)  LUE AROM : WFL  RUE AROM (degrees)  RUE AROM : WFL  LUE Strength  Gross LUE Strength: WFL  LUE Strength Comment: not formally assessed. Observations made during ADLs and mobility. . Estimated 4/5  RUE Strength  Gross RUE Strength: WFL  RUE Strength Comment: not formally assessed. Observations made during ADLs and mobility. . Estimated 4/5                   Plan   Plan  Times per week: 3-5x/wk  Current Treatment Recommendations: Functional Mobility Training, Safety Education & Training, Self-Care / ADL, Patient/Caregiver Education & Training, Strengthening, Endurance Training    AM-PAC Score        AM-[facility] Inpatient Daily Activity Raw Score: 15 (09/02/21 1306)  AM-PAC Inpatient ADL T-Scale Score : 34.69 (09/02/21 1306)  ADL Inpatient CMS 0-100% Score: 56.46 (09/02/21 1306)  ADL Inpatient CMS G-Code Modifier : CK (09/02/21 1306)    Goals  Short term goals  Time Frame for Short term goals: 1 weel 9/9/21  Short term goal 1: Pt will complete toilet transfer with S  Short term goal 2: Pt will complete LB dressing with SBA  Short term goal 3: Pt will complete ~5 minutes of standing ADLs without LOB episodes, SBA. Short term goal 4: Pt will complete 15 reps of UE exercises to improve strength and endurance by 9/6  Patient Goals   Patient goals : \"to go home\"       Therapy Time   Individual Concurrent Group Co-treatment   Time In 0849         Time Out 0930         Minutes 41         Timed Code Treatment Minutes: 31 Minutes (10 for eval)       Young Cogan, S/OT    If pt is unable to be seen after this session, please let this note serve as discharge summary. Please see case management note for discharge disposition. Thank you.

## 2021-09-03 LAB
ANION GAP SERPL CALCULATED.3IONS-SCNC: 10 MMOL/L (ref 3–16)
BUN BLDV-MCNC: 31 MG/DL (ref 7–20)
CALCIUM SERPL-MCNC: 9.4 MG/DL (ref 8.3–10.6)
CHLORIDE BLD-SCNC: 94 MMOL/L (ref 99–110)
CO2: 36 MMOL/L (ref 21–32)
CREAT SERPL-MCNC: 1.6 MG/DL (ref 0.8–1.3)
GFR AFRICAN AMERICAN: 51
GFR NON-AFRICAN AMERICAN: 42
GLUCOSE BLD-MCNC: 128 MG/DL (ref 70–99)
GLUCOSE BLD-MCNC: 132 MG/DL (ref 70–99)
GLUCOSE BLD-MCNC: 136 MG/DL (ref 70–99)
GLUCOSE BLD-MCNC: 149 MG/DL (ref 70–99)
GLUCOSE BLD-MCNC: 178 MG/DL (ref 70–99)
INR BLD: 2.81 (ref 0.88–1.12)
MAGNESIUM: 1.9 MG/DL (ref 1.8–2.4)
PERFORMED ON: ABNORMAL
POTASSIUM SERPL-SCNC: 3.8 MMOL/L (ref 3.5–5.1)
PROTHROMBIN TIME: 33.1 SEC (ref 9.9–12.7)
SODIUM BLD-SCNC: 140 MMOL/L (ref 136–145)

## 2021-09-03 PROCEDURE — 6360000002 HC RX W HCPCS: Performed by: NURSE PRACTITIONER

## 2021-09-03 PROCEDURE — 97110 THERAPEUTIC EXERCISES: CPT

## 2021-09-03 PROCEDURE — 2580000003 HC RX 258: Performed by: INTERNAL MEDICINE

## 2021-09-03 PROCEDURE — 2580000003 HC RX 258: Performed by: NURSE PRACTITIONER

## 2021-09-03 PROCEDURE — 6360000002 HC RX W HCPCS: Performed by: INTERNAL MEDICINE

## 2021-09-03 PROCEDURE — 36415 COLL VENOUS BLD VENIPUNCTURE: CPT

## 2021-09-03 PROCEDURE — 94761 N-INVAS EAR/PLS OXIMETRY MLT: CPT

## 2021-09-03 PROCEDURE — 83735 ASSAY OF MAGNESIUM: CPT

## 2021-09-03 PROCEDURE — 6370000000 HC RX 637 (ALT 250 FOR IP): Performed by: HOSPITALIST

## 2021-09-03 PROCEDURE — 85610 PROTHROMBIN TIME: CPT

## 2021-09-03 PROCEDURE — 6370000000 HC RX 637 (ALT 250 FOR IP): Performed by: INTERNAL MEDICINE

## 2021-09-03 PROCEDURE — 80048 BASIC METABOLIC PNL TOTAL CA: CPT

## 2021-09-03 PROCEDURE — 99233 SBSQ HOSP IP/OBS HIGH 50: CPT | Performed by: NURSE PRACTITIONER

## 2021-09-03 PROCEDURE — 97535 SELF CARE MNGMENT TRAINING: CPT

## 2021-09-03 PROCEDURE — 97116 GAIT TRAINING THERAPY: CPT

## 2021-09-03 PROCEDURE — 2580000003 HC RX 258: Performed by: HOSPITALIST

## 2021-09-03 PROCEDURE — 2060000000 HC ICU INTERMEDIATE R&B

## 2021-09-03 PROCEDURE — 97530 THERAPEUTIC ACTIVITIES: CPT

## 2021-09-03 PROCEDURE — 2700000000 HC OXYGEN THERAPY PER DAY

## 2021-09-03 RX ORDER — TORSEMIDE 20 MG/1
20 TABLET ORAL DAILY
Status: DISCONTINUED | OUTPATIENT
Start: 2021-09-03 | End: 2021-09-04 | Stop reason: HOSPADM

## 2021-09-03 RX ORDER — WARFARIN SODIUM 1 MG/1
1.5 TABLET ORAL
Status: COMPLETED | OUTPATIENT
Start: 2021-09-03 | End: 2021-09-03

## 2021-09-03 RX ADMIN — CARVEDILOL 6.25 MG: 3.12 TABLET, FILM COATED ORAL at 17:37

## 2021-09-03 RX ADMIN — CARVEDILOL 6.25 MG: 3.12 TABLET, FILM COATED ORAL at 08:53

## 2021-09-03 RX ADMIN — CITALOPRAM HYDROBROMIDE 20 MG: 20 TABLET ORAL at 08:53

## 2021-09-03 RX ADMIN — FUROSEMIDE 5 MG/HR: 10 INJECTION, SOLUTION INTRAMUSCULAR; INTRAVENOUS at 00:14

## 2021-09-03 RX ADMIN — WARFARIN SODIUM 1.5 MG: 1 TABLET ORAL at 17:45

## 2021-09-03 RX ADMIN — IRON SUCROSE 100 MG: 20 INJECTION, SOLUTION INTRAVENOUS at 10:50

## 2021-09-03 RX ADMIN — DIGOXIN 125 MCG: 125 TABLET ORAL at 08:53

## 2021-09-03 RX ADMIN — PRAVASTATIN SODIUM 40 MG: 40 TABLET ORAL at 20:37

## 2021-09-03 RX ADMIN — Medication 10 ML: at 20:39

## 2021-09-03 RX ADMIN — TORSEMIDE 20 MG: 20 TABLET ORAL at 10:45

## 2021-09-03 RX ADMIN — INSULIN LISPRO 1 UNITS: 100 INJECTION, SOLUTION INTRAVENOUS; SUBCUTANEOUS at 20:38

## 2021-09-03 RX ADMIN — INSULIN LISPRO 2 UNITS: 100 INJECTION, SOLUTION INTRAVENOUS; SUBCUTANEOUS at 11:52

## 2021-09-03 RX ADMIN — PANTOPRAZOLE SODIUM 40 MG: 40 TABLET, DELAYED RELEASE ORAL at 06:16

## 2021-09-03 RX ADMIN — ASPIRIN 81 MG: 81 TABLET, COATED ORAL at 08:53

## 2021-09-03 ASSESSMENT — PAIN SCALES - GENERAL: PAINLEVEL_OUTOF10: 0

## 2021-09-03 NOTE — PROGRESS NOTES
Physical Therapy  Facility/Department: [facility] A2 CARD TELEMETRY  Daily Treatment Note  NAME: Billy Lewis  : 1940  MRN: [id number]    Date of Service: 9/3/2021    Discharge Recommendations:  Home with Home health PT, Home with assist PRN   PT Equipment Recommendations  Equipment Needed: No    Assessment   Body structures, Functions, Activity limitations: Decreased functional mobility ; Decreased balance;Decreased posture;Decreased endurance  Assessment: Pt tolerated therapy well this date. Progressing to SBA for transfers and gait with RW. Able to progress to 120 ft of gait training without LOB. Pt would benefit from continued skilled therapy to address deficits. Recommend home with assist prn and home PT at d/c. Treatment Diagnosis: impaired functional mobility  Specific instructions for Next Treatment: progress mobility as tolerated  Prognosis: Good  Decision Making: Low Complexity  PT Education: Goals; General Safety;Gait Training;PT Role;Disease Specific Education;Plan of Care; Functional Mobility Training;Home Exercise Program;Transfer Training  Disease Specific Education: Pt educated on importance of OOB mobility, prevention of complications of bedrest, and general safety during hospitalization. Pt verbalized understanding  Barriers to Learning: none  REQUIRES PT FOLLOW UP: Yes  Activity Tolerance  Activity Tolerance: Patient Tolerated treatment well;Patient limited by endurance  Activity Tolerance: Post gait seated in chair /66, HR 71; SpO2 95% on 1L     Patient Diagnosis(es): The primary encounter diagnosis was Chest pain, unspecified type. A diagnosis of Acute on chronic systolic congestive heart failure (HCC) was also pertinent to this visit. has a past medical history of Acute MI ([address] Cancer ([facility] [address] CHF (congestive heart failure) ([facility] [address] Diabetes mellitus ([facility] [address] GERD (gastroesophageal reflux disease), Hyperlipidemia, Hypertension, and Neuropathy.    has a past surgical history that includes Splenectomy (2002); Cholecystectomy; Appendectomy; Pilonidal cyst excision; Tunneled venous port placement; Colonoscopy; Colonoscopy (10/7/13); Coronary angioplasty with stent; other surgical history (Left, 04/05/2019); Cataract removal with implant (Left, 08/08/2019); Intracapsular cataract extraction (Left, 8/8/2019); Cataract removal with implant (Right, 09/05/2019); and Intracapsular cataract extraction (Right, 9/5/2019). Restrictions  Restrictions/Precautions  Restrictions/Precautions: General Precautions, Fall Risk  Position Activity Restriction  Other position/activity restrictions: BR with BSC, IV, tele  Subjective   General  Chart Reviewed: Yes  Response To Previous Treatment: Patient with no complaints from previous session. Family / Caregiver Present: Yes (daughter)  Referring Practitioner: Dr. Hermann Corrigan MD  Subjective  Subjective: pt agreeable to therapy  General Comment  Comments: Pt resting in bed on approach; RN cleared pt for therapy  Pain Screening  Patient Currently in Pain: Denies       Objective   Bed mobility  Supine to Sit: Supervision (HOB elevated, use of rails)  Sit to Supine: Unable to assess (pt up in chair at end of session)     Transfers  Sit to Stand: Stand by assistance (from EOB x 2 reps with cues for safe hand placement standing to RW)  Stand to sit: Stand by assistance     Ambulation  Ambulation?: Yes  Ambulation 1  Surface: level tile  Device: Rolling Walker  Assistance: Stand by assistance  Quality of Gait: Cues for posture and sequencing. Cues to maintain CHRISTIANO within RW. no LOB. Able to correct posture with cues but fatigues quickly  Gait Deviations: Shuffles; Slow Juliana;Decreased step length;Decreased step height  Distance: 120 ft        Exercises  Gluteal Sets: x 20 BLE  Hip Flexion: Seated marches x 20 BLE  Hip Abduction: Seated clamshell x 20 BLE  Knee Long Arc Quad: x 20 BLE  Ankle Pumps: x 20 BLE  Comments: cues for technique AM-PAC Score     AM-PAC Inpatient Mobility without Stair Climbing Raw Score : 17 (09/03/21 1034)  AM-PAC Inpatient without Stair Climbing T-Scale Score : 48.47 (09/03/21 1034)  Mobility Inpatient CMS 0-100% Score: 32.72 (09/03/21 1034)  Mobility Inpatient without Stair CMS G-Code Modifier : CJ (09/03/21 1034)       Goals  Short term goals  Time Frame for Short term goals: 1 week (9/09) unless otherwise specified  Short term goal 1: Pt will be mod I with bed mobility. -- supervision 9/03  Short term goal 2: Pt will be supervision for transfers with RW. -- SBA 9/03  Short term goal 3: Pt will ambulate 100 ft with RW and supervision. -- 120 ft SBA 9/03  Short term goal 4: 9/06: Pt will participate in 12-15 reps of BLE exercises to promote strength and activity tolerance. -- GOAL MET 9/03 and ongoing  Patient Goals   Patient goals : \"to get better and go home\"    Plan    Plan  Times per week: 3-5x/wk  Times per day: Daily  Specific instructions for Next Treatment: progress mobility as tolerated  Current Treatment Recommendations: Strengthening, Neuromuscular Re-education, Home Exercise Program, Safety Education & Training, Balance Training, Endurance Training, Functional Mobility Training, Transfer Training, Cognitive Reorientation, Equipment Evaluation, Education, & procurement, Patient/Caregiver Education & Training  Safety Devices  Type of devices: All fall risk precautions in place, Call light within reach, Chair alarm in place, Gait belt, Patient at risk for falls, Nurse notified, Left in chair     Therapy Time   Individual Concurrent Group Co-treatment   Time In 0929         Time Out 1001         Minutes 32         Timed Code Treatment Minutes: [address] [address], PT, DPT  If pt is unable to be seen after this session, please let this note serve as discharge summary. Please see case management note for discharge disposition. Thank you.

## 2021-09-03 NOTE — CARE COORDINATION
Pt lives w/spouse w/plans to d/c back home;  Spouse will transport. Pt is open to OP PT/OT therapy. Pt currently on lasix drip. CM will continue to follow for needs.  Linda Sanz RN spouse

## 2021-09-03 NOTE — PROGRESS NOTES
[facility]   Daily Progress Note    Admit Date:  9/1/2021  HPI:    Chief Complaint   Patient presents with    Shortness of Breath     started a couple days ago,     Chest Pain     started a couple days ago, took two of nitro, hx of chf,       Randy Sousa presented with worsening congestion and shortness of breath with minimal exertion as well as 11 pound weight gain. He and his wife report that the spironolactone was recently discontinued due to hyperkalemia. He was on Lasix 40 mg as needed. He has a history of CAD with BMS to the first diagonal in 2016 as well as ELICIA to the proximal RCA in 2017, ischemic cardiomyopathy, chronic HFrEF, status post BiV ICD, chronic atrial fibrillation on anticoagulation with warfarin, history of DVT and PE, Klinefelter syndrome, diabetes mellitus, HTN, hyperlipidemia, GERD. Cardiology consulted for symptoms of chest pain and shortness of breath as well as elevated troponins. The troponins of been chronically elevated and level at 0.06. ECG with underlying atrial fibrillation and ventricularly paced rhythm  He was started on IV Lasix infusion for further diuresis. Subjective:  Mr. Pedro Sanford sitting up in chair, wife at bedside. States his breathing is much improved and feels that his voice is stronger not as breathy. His weight is down 8 pounds overnight with good urine output.     Objective:   Patient Vitals for the past 24 hrs:   BP Temp Temp src Pulse Resp SpO2 Weight   09/03/21 1246 110/73 97.9 °F (36.6 °C) Oral 70 18 91 % --   09/03/21 0815 -- -- -- -- -- 96 % --   09/03/21 0759 117/71 98.2 °F (36.8 °C) Oral 70 18 96 % --   09/03/21 0410 124/76 98 °F (36.7 °C) Oral 71 20 96 % 221 lb 8 oz (100.5 kg)   09/03/21 0000 115/79 97.9 °F (36.6 °C) Oral 68 18 95 % --   09/02/21 2027 137/79 97.5 °F (36.4 °C) Oral 69 16 97 % --       Intake/Output Summary (Last 24 hours) at 9/3/2021 1426  Last data filed at 9/3/2021 1423  Gross per 24 hour   Intake 819.58 ml auscultation  Cardiovascular:  RRR, normal X2Q4, + systolic murmur, no GR  Abdomen:  Soft, nontender, +bowel sounds  Extremities: 1+ nonpitting BLE edema    Medications:    warfarin  1.5 mg Oral Once    torsemide  20 mg Oral Daily    citalopram  20 mg Oral Daily    digoxin  125 mcg Oral Daily    pantoprazole  40 mg Oral QAM AC    pravastatin  40 mg Oral Nightly    aspirin  81 mg Oral Daily    insulin lispro  0-12 Units SubCUTAneous TID WC    insulin lispro  0-6 Units SubCUTAneous Nightly    sodium chloride flush  10 mL IntraVENous 2 times per day    warfarin (COUMADIN) daily dosing (placeholder)   Other RX Placeholder    carvedilol  6.25 mg Oral BID WC      dextrose      sodium chloride         Lab Data: Lab results independently reviewed and analyzed by myself 9/3/21   CBC:   Recent Labs     09/01/21  1257 09/02/21  0451   WBC 8.0 8.2   HGB 11.8* 11.4*    318     BMP:    Recent Labs     09/01/21  1257 09/02/21  0451 09/03/21  0722    140 140   K 4.3 4.6 3.8   CO2 27 32 36*   BUN 36* 34* 31*   CREATININE 1.6* 1.6* 1.6*     INR:    Recent Labs     09/01/21  1452 09/02/21  0451 09/03/21  0722   INR 2.26* 2.28* 2.81*     BNP:    Recent Labs     09/01/21  1257   PROBNP 9,814*     Cardiac Enzymes:   Recent Labs     09/01/21  2310 09/02/21  0451 09/02/21  1035   TROPONINI 0.06* 0.06* 0.06*     Lipids:   Lab Results   Component Value Date    TRIG 115 09/02/2021    TRIG 246 07/03/2016    HDL 34 09/02/2021    HDL 38 07/03/2016    LDLCALC 56 09/02/2021    LDLCALC 69 07/03/2016       Cardiac Imaging:   TTE 9/2/2021:    Summary   The left ventricular systolic function is moderate to severely reduced with an ejection fraction of 25-30 %. There is hypokinesis of the apex, apical lateral, apical anterior and mid anteroseptum walls. Moderate concentric left ventricular hypertrophy. Grade II diastolic dysfunction with elevated filling pressure. Mild mitral and tricuspid regurgitation.    The right ventricle is mildly enlarged. Right ventricular systolic function is moderately reduced . Systolic pulmonic artery pressure (SPAP) is normal estimated at 35 mmHg (Right atrial pressure of 8 mmHg). The right atrium is mild dilated. TTE 5/28/20:  Study Conclusions  - Left ventricle: The cavity size is mildly dilated. Wall thickness was increased in a pattern of mild LVH. Systolic function was moderately to severely reduced. The estimated ejection fraction was in the range of 30% to 35%. Diffuse hypokinesis. Features are consistent with a pseudonormal left ventricular filling pattern, with concomitant abnormal relaxation and increased filling pressure (grade 2 diastolic dysfunction). Regional wall motion abnormality: Severe hypokinesis of the basal inferior and basal inferolateral myocardium.   - Aortic valve: Mild thickening. Mild calcification.   - Mitral valve: Mild regurgitation.   - Right ventricle: The cavity size is normal. Pacer wire in right ventricle. Systolic function was normal by objective interpretation. TAPSE: 1.7cm.   - Right atrium: Pacer wire or catheter noted in right atrium.   - Pulmonary arteries: Systolic pressure could not be accurately estimated. - Inferior vena cava: Poorly visualized. TTE 3/12/19:  Study Conclusions   - Left ventricle: Systolic function was severely reduced. The estimated ejection fraction was in the range of 25% to 30%. - Mitral valve: Mild regurgitation.   - Left atrium: The atrium was mildly dilated. - Right ventricle: Systolic function was normal. TAPSE: 2cm. TTE 12/13/18:  Study Conclusions   - Left ventricle: The cavity size was normal. Wall thickness was normal. Systolic function was severely reduced. The estimated ejection fraction was in the range of 25% to 30%. Diffuse hypokinesis with regional variations. Severe hypokinesis of the entireanteroseptal, anterior, and anterolateral myocardium.  Doppler parameters are consistent with restrictive physiology, indicative of decreased left ventricular diastolic compliance and/or increased left atrial pressure. - Mitral valve: Mild regurgitation.   - Left atrium: The atrium was mildly dilated. - Right ventricle: Systolic function was low normal. TAPSE: 1.8cm.   - Pulmonary arteries: Systolic pressure was mildly to moderately increased, estimated to be 50mm Hg assuming that the right atrial pressure was 5 mmHg. TTE 12/20/17:  Study Conclusions   - Left ventricle: The cavity size was normal. Wall thickness was normal. Systolic function was moderately reduced. The estimated ejection fraction was in the range of 35% to 40%. Moderate diffuse hypokinesis with regional variations and a small akinetic segment in the apex. - Right ventricle: Systolic function was mildly reduced by objective interpretation. TAPSE: 1.5cm. Tricuspid annular systolic velocity: 9cm/s.   - Atrial septum: Agitated saline contrast study showed no right-to-left atrial level shunt. Impressions:  Technically difficult study, IV contrast was used, likely no change from previous report. [facility] 10/27/17: IMPRESSIONS:  The previously placed stents in the ostial RCA and D1 are patent. The prox-mid LAD lesion is not hemodynamically significant by FFR (0.86). The LV gram showed low EF (30%) with diffuse hypokinesis and mild MR.   SUMMARY:   1. Coronary arteries: The coronary circulation is right dominant. The left main bifurcates normally into the LAD and circumflex. The left anterior descending gives rise to 1 diagonal. The left circumflex gives rise to 3 obtuse marginals. The right coronary gives rise to the posterior descending artery and 2 posterolaterals. 2. Left main: Patent. 3. LAD: Mid-vessel lesion: There is a 50% stenosis. 4. 1st diagonal: Well visualized. Medium-large. Prior intervention: stent in the mid D1. The stented segment is patent. 5. 1st septal: Lesion: There is a 90% stenosis.    6. Left circumflex: Mid-vessel lesion: There is a 30% stenosis. 7. Right coronary: Prior intervention: stent in the proximal RCA. Proximal vessel lesion: There is a 30% in-stent restenosis. 8. RCA posterolateral extension: Lesion: There is a 20% stenosis. RECOMMENDATIONS:   1. Intensive medical therapy for CAD. 2. Since the degree of cardiomyopathy is disproportionate to the CAD, further evaluation for non-ischemic etiologies is recommended.

## 2021-09-03 NOTE — PROGRESS NOTES
Hospitalist Progress Note      PCP: Cielo Mazariegos MD    Date of Admission: 9/1/2021    Chief Complaint: SOB , Chest Pain     Hospital Course: Reviewed H&P     Subjective: Patient seen and examined this morning. Reports feeling better. Negative balance of 5.1 L thus far. RN reports no acute events overnight. Evaluated by nephrology/cardiology. Nephrology discontinued IV Lasix GTT today and transition to p.o. torsemide. Await PT OT evaluation. Denies any chest pain, palpitations, nausea, vomiting, diarrhea. Echo on 9/1/2021 showed LVEF 25 to 30% (no change from prior) ; hypokinesis of the apex, apical lateral, apical anterior and mid anteroseptal walls with grade 2 diastolic dysfunction.   -D/w Patient , RN and with SW during interdisciplinary rounds regarding care plan      Medications:  Reviewed    Infusion Medications    dextrose      sodium chloride       Scheduled Medications    warfarin  1.5 mg Oral Once    torsemide  20 mg Oral Daily    citalopram  20 mg Oral Daily    digoxin  125 mcg Oral Daily    pantoprazole  40 mg Oral QAM AC    pravastatin  40 mg Oral Nightly    aspirin  81 mg Oral Daily    insulin lispro  0-12 Units SubCUTAneous TID WC    insulin lispro  0-6 Units SubCUTAneous Nightly    sodium chloride flush  10 mL IntraVENous 2 times per day    warfarin (COUMADIN) daily dosing (placeholder)   Other RX Placeholder    carvedilol  6.25 mg Oral BID WC     PRN Meds: glucose, dextrose, glucagon (rDNA), dextrose, perflutren lipid microspheres, sodium chloride flush, sodium chloride, senna, acetaminophen **OR** acetaminophen, potassium chloride **OR** potassium alternative oral replacement **OR** potassium chloride, magnesium sulfate, ondansetron **OR** ondansetron      Intake/Output Summary (Last 24 hours) at 9/3/2021 1217  Last data filed at 9/3/2021 1152  Gross per 24 hour   Intake 579.58 ml   Output 3805 ml   Net -3225.42 ml       Physical Exam Performed:  /71   Pulse 70 COMPLETE   Final Result   No gross hydronephrosis. XR CHEST PORTABLE   Final Result   Cardiomegaly with pulmonary vascular congestion. No evidence of pulmonary   edema. No radiographic findings of pneumonia                 Assessment/Plan:    Active Hospital Problems    Diagnosis Date Noted    Acute kidney injury superimposed on CKD ([facility] [N17.9, N18.9] 09/01/2021    Pulmonary vascular congestion [R09.89] 09/01/2021    HTN (hypertension) [I10] 09/01/2021    PAF (paroxysmal atrial fibrillation) ([facility] [I48.0] 09/01/2021    Acute decompensated heart failure ([facility] [I50.9] 09/01/2021    DM2 (diabetes mellitus, type 2) ([facility] [E11.9] 11/15/2017    Elevated troponin I level [R77.8]     Chest pain with moderate risk of acute coronary syndrome [R07.9] 07/02/2016     1. Acute chest pain with Hx of CAD  -Admitted to [employer] with telemetry; serial troponin trended -flat  -Patient denies any chest pain currently. Continued home dose of aspirin, statin  -Cardiology consulted to assist with management. Echocardiogram ordered and pending  -Defer further ischemic work-up to cardiology. 2.  Acute on chronic combined systolic and diastolic CHF  -Initiated on IV Lasix GTT at 5 cc/h through 9/3/21 with net negative balance of 5.1 L and nephrology transition to p.o. torsemide  -Reviewed last echocardiogram dated 5/28/2020 which showed LVEF 30 to 35% with diffuse hypokinesis. Grade 2 diastolic dysfunction. Repeat echo on 9/1/2021 with no changes in EF.  -Strict I's/O monitoring and daily weights    3. Hypertension -optimal control on home medication; continue    4. OLIVIER-creatinine 1.6 on admission; baseline within normal limits.  -Concern for cardiorenal syndrome. -Nephrology consulted to assist with management especially in setting of IV diuresis. Monitor BMP  -Hold ARB in the setting of OLIVIER. 5.  Type 2 diabetes mellitus --check HbA1c; sliding scale insulin and diabetic diet. 6.  PAF  -Patient currently

## 2021-09-03 NOTE — PLAN OF CARE
Problem:  Activity:  Goal: Capacity to carry out activities will improve  Description: Capacity to carry out activities will improve  Outcome: Ongoing

## 2021-09-03 NOTE — PROGRESS NOTES
Department of Internal Medicine  Nephrology Progress Note        SUBJECTIVE:    We are following this patient for OLIVIER. The patient was seen and examined; he feels well today with no CP, SOB, nausea or vomiting. ROS: No fever or chills. Social: Family at bedside. Physical Exam:    VITALS:  /71   Pulse 70   Temp 98.2 °F (36.8 °C) (Oral)   Resp 18   Ht 6' 3\" (1.905 m)   Wt 221 lb 8 oz (100.5 kg)   SpO2 96%   BMI 27.69 kg/m²     General appearance: Seems comfortable, no acute distress. Neck: Trachea midline, thyroid normal.   Lungs:  Non labored breathing, CTA to anterior auscultation. Heart:  S1S2 normal, rub or gallop. No peripheral edema. Abdomen: Soft, non-tender, no organomegaly. Skin: No lesions or rashes, warm to touch. DATA:    CBC:   Lab Results   Component Value Date    WBC 8.2 09/02/2021    RBC 4.40 09/02/2021    HGB 11.4 09/02/2021    HCT 36.2 09/02/2021    MCV 82.2 09/02/2021    MCH 25.9 09/02/2021    MCHC 31.5 09/02/2021    RDW 17.1 09/02/2021     09/02/2021    MPV 7.9 09/02/2021     BMP:    Lab Results   Component Value Date     09/03/2021    K 3.8 09/03/2021    K 4.3 09/01/2021    CL 94 09/03/2021    CO2 36 09/03/2021    BUN 31 09/03/2021    LABALBU 4.1 09/01/2021    CREATININE 1.6 09/03/2021    CALCIUM 9.4 09/03/2021    GFRAA 51 09/03/2021    LABGLOM 42 09/03/2021    GLUCOSE 136 09/03/2021       IMPRESSION/RECOMMENDATIONS:      - OLIVIER: most likely cardiorenal + losartan. renal US reviewed. consider changing lasix drip to torsemide 20 mg daily and monitor closely. Baseline sr cr 1.0. Sr cr 1.6-> 1.6.      - Acute on chronic systolic CHF (EF 94%): on lasix drip. Cardiology consulted.     - DMII: as per primary     - Dyspnea: due to pulmonary congestion + CHF. Reviewed chest x ray.   Getting better with diuresis     - HTN: fairly controlled.      - A fib

## 2021-09-03 NOTE — PLAN OF CARE
is this patient's Current Level of Mobility?: Ambulatory- with Assistance  How was this patient Mobilized today?: Edge of Bed, Up to Chair, Bedside Commode,  Up to Toilet/Shower, and Up in Room                 With Whom? Nurse, PCA, PT, and OT                 Level of Difficulty/Assistance: 1x Assist     Pt resting in bed at this time on  2 L O2. Pt denies shortness of breath. Pt with nonpitting lower extremity edema.      Patient and/or Family's stated Goal of Care this Admission: reduce shortness of breath, increase activity tolerance, better understand heart failure and disease management, be more comfortable, and reduce lower extremity edema prior to discharge        :

## 2021-09-03 NOTE — PLAN OF CARE
Problem: Serum Glucose Level - Abnormal:  Goal: Ability to maintain appropriate glucose levels will improve  Description: Ability to maintain appropriate glucose levels will improve  Outcome: Ongoing   Reviewed with pt importance of carb control diet to help manage diabetes. Pt educated on sliding scale insulin. Pt verbalizes understanding.

## 2021-09-03 NOTE — PROGRESS NOTES
NP paged, \"pt on Lasix gtt at 5ml/hr. All the notes from nephro and cardio seem to agree with the gtt running however the order as  and it needs reordered for pharmacy to mix of a bag of it. Could we get it reordered? Thank you!  \"

## 2021-09-03 NOTE — PROGRESS NOTES
LM for Mynor Jeffries MD MA to schedule hospital follow up. Awaiting to hear back with appointment date and time.  Sebastian Elias RN

## 2021-09-03 NOTE — PROGRESS NOTES
Pharmacy Note  Warfarin Consult  Dx: Afib  Goal INR range 2-3   Home Warfarin dose: 5 mg every M-W-Sat. 2.5 mg all other days     Date                 INR                  Warfarin_  9/1                  2.26                    5 mg  9/2                  2.28                    2.5 mg    9/3                  2.81                    1.5 mg     Recommend Warfarin 1.5 mg tonight x1 d/t slight jump in INR. Daily INR ordered. Rx will continue to manage therapy per consult order.   Micheline Pinon, PharmD  9/3/2021 at 8:31 AM

## 2021-09-03 NOTE — PROGRESS NOTES
Occupational Therapy  Facility/Department: [facility] A2 CARD TELEMETRY  Daily Treatment Note  NAME: Samantha Urbina  : 1940  MRN: [id number]    Date of Service: 9/3/2021    Discharge Recommendations:  24 hour supervision or assist, Home with Home health OT       Assessment   Performance deficits / Impairments: Decreased balance;Decreased safe awareness;Decreased functional mobility ; Decreased ADL status  Assessment: pt normally independent with BADL's & functional mobility with RW, has progressed to min assist for initial sit-->stand to CGA with bathroom mobility with RW; maintains O 2 sats on RA at 91-93 % ; continues to benefit from skilled OT services  OT Education: OT Role;Plan of Care;Transfer Training;Precautions  Patient Education: disease specific: importance of getting balance before performing 1 handed ADL's, use of nurse call light for A with transfers,  Barriers to Learning: safety  REQUIRES OT FOLLOW UP: Yes  Activity Tolerance  Activity Tolerance: Patient Tolerated treatment well  Activity Tolerance: sitting in chair on 1 L O 2:  95 % O 2 sats; sitting in chair after standing/bathroom mobility on RA:  BP = 110/68, HR = 71, 91 % O 2 sats; sitting in chair after sit<-->stand 5 x on RA:  93 % O 2 sats; Safety Devices  Safety Devices in place: Yes  Type of devices: Call light within reach; Chair alarm in place; Left in chair;Nurse notified         Patient Diagnosis(es): The primary encounter diagnosis was Chest pain, unspecified type. A diagnosis of Acute on chronic systolic congestive heart failure (HCC) was also pertinent to this visit. has a past medical history of Acute MI ([facility] Cancer ([facility] CHF (congestive heart failure) ([facility] Diabetes mellitus ([facility] GERD (gastroesophageal reflux disease), Hyperlipidemia, Hypertension, and Neuropathy. has a past surgical history that includes Splenectomy (); Cholecystectomy; Appendectomy; Pilonidal cyst excision;  Tunneled venous port placement; Colonoscopy; Colonoscopy (10/7/13); Coronary angioplasty with stent; other surgical history (Left, 04/05/2019); Cataract removal with implant (Left, 08/08/2019); Intracapsular cataract extraction (Left, 8/8/2019); Cataract removal with implant (Right, 09/05/2019); and Intracapsular cataract extraction (Right, 9/5/2019).     Restrictions  Restrictions/Precautions  Restrictions/Precautions: General Precautions, Fall Risk  Position Activity Restriction  Other position/activity restrictions: BR with BSC,  tele  Subjective   General  Chart Reviewed: Yes  Patient assessed for rehabilitation services?: Yes  Family / Caregiver Present: Yes (wife)  Subjective  Subjective: No complaints offered  General Comment  Comments: RN cleared pt for OT; pt up in chair agreeable to therapy  Vital Signs  Patient Currently in Pain: Denies   Orientation  Orientation  Overall Orientation Status: Within Functional Limits  Objective    ADL  Grooming: Contact guard assistance (standing at sink to wash hands)  LE Dressing: Minimal assistance (significant posterior lean with initial static standing with decreased safety awareness for balance righting)        Balance  Sitting Balance: Supervision (supported sitting in chair)  Standing Balance: Minimal assistance (initially from chair, progressed to CGA)  Standing Balance  Time: 3 minutes  Activity: bathroom mobility  Functional Mobility  Functional - Mobility Device: Rolling Walker  Activity: To/from bathroom  Assist Level: Contact guard assistance  Bed mobility  Supine to Sit: Unable to assess (already up in chair)  Sit to Supine: Unable to assess (Left up in chair for lunch, pt & wife agreeable)  Scooting: Supervision  Transfers  Sit to stand: Minimal assistance (initally from chair, progressed to CGA)  Stand to sit: Contact guard assistance  Transfer Comments: good awareness of safe hand placement, but decreased awareness of right self to prevent posterior lean     Cognition  Overall Cognitive Status: Exceptions  Arousal/Alertness: Appropriate responses to stimuli  Following Commands:  Follows one step commands consistently  Attention Span: Appears intact  Memory: Decreased recall of precautions  Safety Judgement: Decreased awareness of need for safety  Problem Solving: Assistance required to identify errors made;Assistance required to correct errors made  Insights: Decreased awareness of deficits  Initiation: Does not require cues  Sequencing: Does not require cues               Plan   Plan  Times per week: 3-5x/wk  Current Treatment Recommendations: Functional Mobility Training, Safety Education & Training, Self-Care / ADL, Patient/Caregiver Education & Training, Strengthening, Endurance Training    AM-PAC Score        AM-[facility] Inpatient Daily Activity Raw Score: 19 (09/03/21 1314)  -PAC Inpatient ADL T-Scale Score : 40.22 (09/03/21 1314)  ADL Inpatient CMS 0-100% Score: 42.8 (09/03/21 1314)  ADL Inpatient CMS G-Code Modifier : CK (09/03/21 1314)    Goals  Short term goals  Time Frame for Short term goals: 1 week 9/9/21  Short term goal 1: Pt will complete toilet transfer with S  Short term goal 2: Pt will complete LB dressing with SBA; 9/03 min assist due to posterior lean in standing  Short term goal 3: Pt will complete ~5 minutes of standing ADLs without LOB episodes, SBA.; 9/03 min assist static standing 3 minutes due to posterior lean  Short term goal 4: Pt will complete 15 reps of UE exercises to improve strength and endurance by 9/6  Patient Goals   Patient goals : \"to go home\"       Therapy Time   Individual Concurrent Group Co-treatment   Time In 9276         Time Out 1214         Minutes Keisha Wasserman

## 2021-09-03 NOTE — PLAN OF CARE
Problem: Activity:  Goal: Will verbalize the importance of balancing activity with adequate rest periods  Description: Will verbalize the importance of balancing activity with adequate rest periods  Outcome: Ongoing     Patient's EF (Ejection Fraction) is less than 40%    Heart Failure Medications:  Diuretics[de-identified] Torsemide    (One of the following REQUIRED for EF <40%/SYSTOLIC FAILURE but MAY be used in EF% >40%/DIASTOLIC FAILURE)        ACE[de-identified] None        ARB[de-identified] None         ARNI[de-identified] None    (Beta Blockers)  NON- Evidenced Based Beta Blocker (for EF% >40%/DIASTOLIC FAILURE): None    Evidenced Based Beta Blocker::(REQUIRED for EF% <40%/SYSTOLIC FAILURE) Carvedilol- Coreg  . .................................................................................................................................................. Patient's weights and intake/output reviewed: Yes    Patient's Last Weight: 221 lbs obtained by standing scale. Difference of 8 lbs less than last documented weight. Intake/Output Summary (Last 24 hours) at 9/3/2021 1451  Last data filed at 9/3/2021 1423  Gross per 24 hour   Intake 819.58 ml   Output 3805 ml   Net -2985.42 ml       Comorbidities Reviewed Yes    Patient has a past medical history of Acute MI ([address] Cancer ([address] CHF (congestive heart failure) ([address] Diabetes mellitus ([address] GERD (gastroesophageal reflux disease), Hyperlipidemia, Hypertension, and Neuropathy. >>For CHF and Comorbidity documentation on Education Time and Topics, please see Education Tab    Progressive Mobility Assessment:  What is this patient's Current Level of Mobility?: Ambulatory- with Assistance  How was this patient Mobilized today?: Edge of Bed, Up to Chair,  Up to Toilet/Shower, Up in Room, and Up in Hallway                 With Whom? Nurse, PCA, PT, and OT                 Level of Difficulty/Assistance: 1x Assist     Pt up in chair at this time on room air. Pt denies shortness of breath.  Pt with nonpitting lower extremity edema.      Patient and/or Family's stated Goal of Care this Admission: reduce shortness of breath, increase activity tolerance, better understand heart failure and disease management, be more comfortable, and reduce lower extremity edema prior to discharge        :

## 2021-09-04 VITALS
DIASTOLIC BLOOD PRESSURE: 75 MMHG | SYSTOLIC BLOOD PRESSURE: 126 MMHG | OXYGEN SATURATION: 92 % | HEART RATE: 71 BPM | WEIGHT: 219.7 LBS | TEMPERATURE: 97.8 F | RESPIRATION RATE: 18 BRPM | HEIGHT: 75 IN | BODY MASS INDEX: 27.32 KG/M2

## 2021-09-04 LAB
ANION GAP SERPL CALCULATED.3IONS-SCNC: 11 MMOL/L (ref 3–16)
BUN BLDV-MCNC: 28 MG/DL (ref 7–20)
CALCIUM SERPL-MCNC: 9.5 MG/DL (ref 8.3–10.6)
CHLORIDE BLD-SCNC: 94 MMOL/L (ref 99–110)
CO2: 33 MMOL/L (ref 21–32)
CREAT SERPL-MCNC: 1.6 MG/DL (ref 0.8–1.3)
GFR AFRICAN AMERICAN: 51
GFR NON-AFRICAN AMERICAN: 42
GLUCOSE BLD-MCNC: 112 MG/DL (ref 70–99)
GLUCOSE BLD-MCNC: 112 MG/DL (ref 70–99)
GLUCOSE BLD-MCNC: 129 MG/DL (ref 70–99)
HCT VFR BLD CALC: 37.1 % (ref 40.5–52.5)
HEMOGLOBIN: 11.5 G/DL (ref 13.5–17.5)
INR BLD: 2.6 (ref 0.88–1.12)
MAGNESIUM: 1.9 MG/DL (ref 1.8–2.4)
MCH RBC QN AUTO: 25.9 PG (ref 26–34)
MCHC RBC AUTO-ENTMCNC: 31.1 G/DL (ref 31–36)
MCV RBC AUTO: 83.3 FL (ref 80–100)
PDW BLD-RTO: 16.9 % (ref 12.4–15.4)
PERFORMED ON: ABNORMAL
PERFORMED ON: ABNORMAL
PLATELET # BLD: 323 K/UL (ref 135–450)
PMV BLD AUTO: 8.7 FL (ref 5–10.5)
POTASSIUM SERPL-SCNC: 4 MMOL/L (ref 3.5–5.1)
PRO-BNP: 8842 PG/ML (ref 0–449)
PROTHROMBIN TIME: 30.6 SEC (ref 9.9–12.7)
RBC # BLD: 4.45 M/UL (ref 4.2–5.9)
SODIUM BLD-SCNC: 138 MMOL/L (ref 136–145)
WBC # BLD: 7.3 K/UL (ref 4–11)

## 2021-09-04 PROCEDURE — 36415 COLL VENOUS BLD VENIPUNCTURE: CPT

## 2021-09-04 PROCEDURE — 83880 ASSAY OF NATRIURETIC PEPTIDE: CPT

## 2021-09-04 PROCEDURE — 83735 ASSAY OF MAGNESIUM: CPT

## 2021-09-04 PROCEDURE — 6370000000 HC RX 637 (ALT 250 FOR IP): Performed by: HOSPITALIST

## 2021-09-04 PROCEDURE — 2580000003 HC RX 258: Performed by: HOSPITALIST

## 2021-09-04 PROCEDURE — 80048 BASIC METABOLIC PNL TOTAL CA: CPT

## 2021-09-04 PROCEDURE — 6370000000 HC RX 637 (ALT 250 FOR IP): Performed by: INTERNAL MEDICINE

## 2021-09-04 PROCEDURE — 85027 COMPLETE CBC AUTOMATED: CPT

## 2021-09-04 PROCEDURE — 85610 PROTHROMBIN TIME: CPT

## 2021-09-04 RX ORDER — TORSEMIDE 20 MG/1
20 TABLET ORAL DAILY
Qty: 30 TABLET | Refills: 3 | Status: SHIPPED | OUTPATIENT
Start: 2021-09-05

## 2021-09-04 RX ORDER — CARVEDILOL 6.25 MG/1
6.25 TABLET ORAL 2 TIMES DAILY WITH MEALS
Qty: 60 TABLET | Refills: 3 | Status: SHIPPED | OUTPATIENT
Start: 2021-09-04

## 2021-09-04 RX ORDER — WARFARIN SODIUM 2 MG/1
2 TABLET ORAL
Status: DISCONTINUED | OUTPATIENT
Start: 2021-09-04 | End: 2021-09-04 | Stop reason: HOSPADM

## 2021-09-04 RX ORDER — LANOLIN ALCOHOL/MO/W.PET/CERES
325 CREAM (GRAM) TOPICAL
Qty: 30 TABLET | Refills: 2 | Status: SHIPPED | OUTPATIENT
Start: 2021-09-04

## 2021-09-04 RX ADMIN — PANTOPRAZOLE SODIUM 40 MG: 40 TABLET, DELAYED RELEASE ORAL at 07:11

## 2021-09-04 RX ADMIN — Medication 10 ML: at 08:39

## 2021-09-04 RX ADMIN — ASPIRIN 81 MG: 81 TABLET, COATED ORAL at 08:38

## 2021-09-04 RX ADMIN — TORSEMIDE 20 MG: 20 TABLET ORAL at 09:16

## 2021-09-04 RX ADMIN — CARVEDILOL 6.25 MG: 3.12 TABLET, FILM COATED ORAL at 08:38

## 2021-09-04 RX ADMIN — CITALOPRAM HYDROBROMIDE 20 MG: 20 TABLET ORAL at 08:38

## 2021-09-04 RX ADMIN — DIGOXIN 125 MCG: 125 TABLET ORAL at 08:38

## 2021-09-04 ASSESSMENT — PAIN SCALES - GENERAL: PAINLEVEL_OUTOF10: 0

## 2021-09-04 NOTE — PLAN OF CARE
Problem: Discharge Planning:  Goal: Discharged to appropriate level of care  Description: Discharged to appropriate level of care  Outcome: Ongoing   Diabetes education given for home management.

## 2021-09-04 NOTE — PROGRESS NOTES
Bedside report given to [facility]. Call light and bedside table within reach. No needs voiced at this time. Bed in lowest position, brakes locked. Chair alarm on and in place.

## 2021-09-04 NOTE — CARE COORDINATION
Reviewed recs for HHPT/OT with pt spouse Jono Medina. She states that they would prefer OP PT/OT and will request prescription or order from hospitalist prior to d/c. They have used [facility] OP PT/OT in past and will take order to them.

## 2021-09-04 NOTE — PLAN OF CARE
Problem: Falls - Risk of:  Goal: Will remain free from falls  Description: Will remain free from falls  Outcome: Ongoing     Problem: Fluid Volume:  Goal: Risk for excess fluid volume will decrease  Description: Risk for excess fluid volume will decrease  Outcome: Ongoing     Problem: Serum Glucose Level - Abnormal:  Goal: Ability to maintain appropriate glucose levels will improve  Description: Ability to maintain appropriate glucose levels will improve  9/3/2021 2358 by Alice De La Paz RN  Outcome: Ongoing

## 2021-09-04 NOTE — PROGRESS NOTES
Department of Internal Medicine  Nephrology Progress Note        SUBJECTIVE:    We are following this patient for OLIVIER. The patient was seen and examined; he feels well today with no CP, SOB, nausea or vomiting. ROS: No fever or chills. Social: Family at bedside. Physical Exam:    VITALS:  /75   Pulse 71   Temp 97.8 °F (36.6 °C) (Oral)   Resp 18   Ht 6' 3\" (1.905 m)   Wt 219 lb 11.2 oz (99.7 kg)   SpO2 92%   BMI 27.46 kg/m²     General appearance: Seems comfortable, no acute distress. Neck: Trachea midline, thyroid normal.   Lungs:  Non labored breathing, CTA to anterior auscultation. Heart:  S1S2 normal, rub or gallop. No peripheral edema. Abdomen: Soft, non-tender, no organomegaly. Skin: No lesions or rashes, warm to touch. DATA:    CBC:   Lab Results   Component Value Date    WBC 7.3 09/04/2021    RBC 4.45 09/04/2021    HGB 11.5 09/04/2021    HCT 37.1 09/04/2021    MCV 83.3 09/04/2021    MCH 25.9 09/04/2021    MCHC 31.1 09/04/2021    RDW 16.9 09/04/2021     09/04/2021    MPV 8.7 09/04/2021     BMP:    Lab Results   Component Value Date     09/04/2021    K 4.0 09/04/2021    K 4.3 09/01/2021    CL 94 09/04/2021    CO2 33 09/04/2021    BUN 28 09/04/2021    LABALBU 4.1 09/01/2021    CREATININE 1.6 09/04/2021    CALCIUM 9.5 09/04/2021    GFRAA 51 09/04/2021    LABGLOM 42 09/04/2021    GLUCOSE 112 09/04/2021       IMPRESSION/RECOMMENDATIONS:      - OLIVIER: most likely cardiorenal + losartan. renal US reviewed. Continue torsemide 20 mg daily and monitor closely. Baseline sr cr 1.0. Sr cr 1.6-> 1.6.      - Acute on chronic systolic CHF (EF 55%): on lasix drip. Cardiology consulted.     - DMII: as per primary     - Dyspnea: due to pulmonary congestion + CHF. Reviewed chest x ray.   Getting better with diuresis     - HTN: fairly controlled.      - A fib

## 2021-09-04 NOTE — DISCHARGE SUMMARY
- Cardiology recommended OP f/up with Dr. Clyde Sung at [facility] as planned.     2. Acute on chronic combined systolic and diastolic CHF   -Diuresed well with Lasix infusion at 5 mg/ hour of net -5 L and weight loss of 8 pounds in 24 hours. -Reviewed last echocardiogram dated 5/28/2020 which showed LVEF 30 to 35% with diffuse hypokinesis. Grade 2 diastolic dysfunction. Repeat echo on 9/1/2021 with no changes in EF. -Nephrology transitioned to PO Torsemide 20 mg daily at [facility] .      3. Hypertension -optimal control on home medication; continue     4. OLIVIER-creatinine 1.6 on admission; baseline within normal limits.  -Concern for cardiorenal syndrome. -Nephrology consulted and  Assisted  with management    -Held ARB in the setting of OLIVIER and resumed at [facility] .     5. Type 2 diabetes mellitus --  HbA1c - 6.5% ; Treated with sliding scale insulin and diabetic diet.     6. PAF  -Patient currently rate controlled on Coreg and digoxin  -Continue Coumadin anticoagulation with consultation to pharmacy for dosing recommendations     7. Newly diagnosed iron deficiency anemia -iron studies on 9/1/2021 with iron 37; TIBC 439 and iron saturation 8%.   Received 100 mg of IV Venofer x1 on 9/3/21 and transitioned  to p.o. at discharge.     Patient discharged home in stable medical condition     Physical Exam Performed:   /75   Pulse 71   Temp 97.8 °F (36.6 °C) (Oral)   Resp 18   Ht 6' 3\" (1.905 m)   Wt 219 lb 11.2 oz (99.7 kg)   SpO2 92%   BMI 27.46 kg/m²     General appearance: Elderly male in no apparent distress, stated age, cooperative  Eyes: Sclera clear without conjunctival injection; PERRLA; EOMI  ENT: Mucous membranes moist without thrush; normal dentition  Neck: Supple; no goiter; no carotid bruit bilaterally  Cardiovascular: Regular rhythm without ectopy; normal S1-S2 with no murmurs; 2+ pitting BLE peripheral edema; no JVD  Respiratory:  Normal respiratory rate; bibasilar rales without wheezing or rhonchi; diminished breath sounds at bases  Gastrointestinal: Abdomen soft, non-tender, not distended; bowel sounds normal; no masses/organomegaly appreciated  Musculoskeletal: FROM spine and extremities x4; no gross deformity  Neurology: A&O x3; cranial nerves 2-12 grossly intact; motor 5/5  BUE/BLE; finger-to-nose/heel-to-shin intact; no pronator drift  Psychiatry: Well-groomed with good eye contact; appropriate affect; no visual/auditory hallucination  Skin: Warm, dry, normal turgor, no rash  PV: 2/4 radial and dorsalis pedis bilaterally; brisk capillary refill    Labs: For convenience and continuity at follow-up the following most recent labs are provided:      CBC:    Lab Results   Component Value Date    WBC 7.3 09/04/2021    HGB 11.5 09/04/2021    HCT 37.1 09/04/2021     09/04/2021       Renal:    Lab Results   Component Value Date     09/04/2021    K 4.0 09/04/2021    K 4.3 09/01/2021    CL 94 09/04/2021    CO2 33 09/04/2021    BUN 28 09/04/2021    CREATININE 1.6 09/04/2021    CALCIUM 9.5 09/04/2021         Significant Diagnostic Studies    Radiology:   US RENAL COMPLETE   Final Result   No gross hydronephrosis. XR CHEST PORTABLE   Final Result   Cardiomegaly with pulmonary vascular congestion. No evidence of pulmonary   edema.   No radiographic findings of pneumonia                Consults:   IP CONSULT TO HOSPITALIST  IP CONSULT TO CARDIOLOGY  IP CONSULT TO HEART FAILURE NURSE/COORDINATOR  IP CONSULT TO DIETITIAN  IP CONSULT TO PHARMACY  IP CONSULT TO NEPHROLOGY  IP CONSULT TO SOCIAL WORK    Disposition:   Home with home care     Condition at Discharge: Stable    Discharge Instructions/Follow-up:  PCP and Cardiology as instructed     Code Status:  Full Code     Activity: activity as tolerated    Diet: cardiac diet      Discharge Medications:   Discharge Medication List as of 9/4/2021 12:10 PM           Details   torsemide (DEMADEX) 20 MG tablet Take 1 tablet by mouth daily, Disp-30 tablet, R-3Normal              Details   carvedilol (COREG) 6.25 MG tablet Take 1 tablet by mouth 2 times daily (with meals), Disp-60 tablet, R-3Normal              Details   digoxin (LANOXIN) 125 MCG tablet TAKE 1 TABLET BY MOUTH EVERY DAYHistorical Med      metFORMIN (GLUCOPHAGE) 1000 MG tablet Historical Med      losartan (COZAAR) 25 MG tablet TAKE 1 TABLET BY MOUTH  DAILY FOR CHRONIC HEART  FAILURE, HYPERTENSIONHistorical Med      omeprazole (PRILOSEC) 20 MG delayed release capsule TAKE 1 CAPSULE BY MOUTH  DAILY FOR GASTROESOPHAGEAL  REFLUX DISEASEHistorical Med      pravastatin (PRAVACHOL) 40 MG tablet TAKE 1 TABLET BY MOUTH  DAILYHistorical Med      warfarin (COUMADIN) 2.5 MG tablet Take as directed by [facility]. Current warfarin dose 5 mg Mon/Wed/Sat; 2.5 mg all other days of the week. Historical Med      vitamin D (CHOLECALCIFEROL) 25 MCG (1000 UT) TABS tablet Take 1,000 Units by mouth dailyHistorical Med      Omega-3 Fatty Acids (FISH OIL) 1200 MG CPDR Take by mouthHistorical Med      nitroGLYCERIN (NITROSTAT) 0.4 MG SL tablet Place 1 tablet under the tongue every 5 minutes as needed for Chest pain, Disp-25 tablet, R-1      citalopram (CELEXA) 40 MG tablet Take 40 mg by mouth daily. aspirin 81 MG tablet Take 81 mg by mouth daily Historical Med         Ferrous Sulfate 325 mg                         Take 1 tablet by mouth daily with Breakfast    Time Spent on discharge is more than 30 minutes in the examination, evaluation, counseling and review of medications and discharge plan. Signed:    Elier Ham MD   9/4/2021      Thank you Ofelia Enrique MD for the opportunity to be involved in this patient's care. If you have any questions or concerns please feel free to contact me at [phone].

## 2021-09-04 NOTE — PROGRESS NOTES
Patient's EF (Ejection Fraction) is less than 40%    Heart Failure Medications:   Diuretics[de-identified] torsemide     (One of the following REQUIRED for EF <40%/SYSTOLIC FAILURE but MAY be used in EF% >40%/DIASTOLIC FAILURE)        ACE[de-identified] None        ARB[de-identified] None         ARNI[de-identified] None    (Beta Blockers)   NON- Evidenced Based Beta Blocker (for EF% >40%/DIASTOLIC FAILURE): None     Evidenced Based Beta Blocker::(REQUIRED for EF% <40%/SYSTOLIC FAILURE) Carvedilol- Coreg  . .................................................................................................................................................. Patient's weights and intake/output reviewed: Yes    Patient's Last Weight: 221 lbs obtained by standing scale. Difference of  lbs less than last documented weight. Intake/Output Summary (Last 24 hours) at 9/4/2021 0006  Last data filed at 9/3/2021 2046  Gross per 24 hour   Intake 1299.58 ml   Output 3100 ml   Net -1800.42 ml       Comorbidities Reviewed Yes    Patient has a past medical history of Acute MI ([facility] Cancer ([facility] CHF (congestive heart failure) ([facility] Diabetes mellitus ([facility] GERD (gastroesophageal reflux disease), Hyperlipidemia, Hypertension, and Neuropathy. >>For CHF and Comorbidity documentation on Education Time and Topics, please see Education Tab    Progressive Mobility Assessment:  What is this patient's Current Level of Mobility?: Ambulatory- with Assistance  How was this patient Mobilized today?: To bathroom                 With Whom? Nurse, PCA                 Level of Difficulty/Assistance: 1x Assist     Pt resting in bed at this time on room air. Pt denies shortness of breath. Pt with pitting lower extremity edema.      Patient and/or Family's stated Goal of Care this Admission: increase activity tolerance, better understand heart failure and disease management, be more comfortable and reduce lower extremity edema prior to discharge        :

## 2021-09-04 NOTE — DISCHARGE INSTR - COC
 Chest pain R07.9    Non-ST elevated myocardial infarction ([facility]) I21.4    Elevated troponin I level R77.8    Angina, class IV ([facility]) I20.9    DM2 (diabetes mellitus, type 2) ([facility]) T03.2    Systolic CHF ([facility]) V79.95    Acute kidney injury superimposed on CKD ([facility]) N17.9, N18.9    Pulmonary vascular congestion R09.89    HTN (hypertension) I10    PAF (paroxysmal atrial fibrillation) ([facility]) I48.0    Acute decompensated heart failure ([facility]) I50.9    LVH (left ventricular hypertrophy) I51.7       Isolation/Infection:   Isolation            No Isolation          Patient Infection Status       None to display            Nurse Assessment:  Last Vital Signs: /75   Pulse 71   Temp 97.8 °F (36.6 °C) (Oral)   Resp 18   Ht 6' 3\" (1.905 m)   Wt 219 lb 11.2 oz (99.7 kg)   SpO2 92%   BMI 27.46 kg/m²     Last documented pain score (0-10 scale): Pain Level: 0  Last Weight:   Wt Readings from Last 1 Encounters:   09/04/21 219 lb 11.2 oz (99.7 kg)     Mental Status:  oriented, alert, coherent, logical, thought processes intact and able to concentrate and follow conversation    IV Access:  - None    Nursing Mobility/ADLs:  Walking   Assisted  Transfer  Assisted  Bathing  Assisted  Dressing  Independent  Toileting  Independent  Feeding  Independent  Med Admin  Independent  Med Delivery   whole    Wound Care Documentation and Therapy:        Elimination:  Continence:   · Bowel: Yes  · Bladder: Yes  Urinary Catheter: None   Colostomy/Ileostomy/Ileal Conduit: No       Date of Last BM: ***    Intake/Output Summary (Last 24 hours) at 9/4/2021 0939  Last data filed at 9/4/2021 0924  Gross per 24 hour   Intake 1599.58 ml   Output 1825 ml   Net -225.42 ml     I/O last 3 completed shifts: In: 1299.6 [P.O.:1080; I.V.:120.8; IV Piggyback:98.8]  Out: 2150 [Urine:2150]    Safety Concerns:      At Risk for Falls    Impairments/Disabilities:      None    Nutrition Therapy:  Current Nutrition Therapy:   - Oral Diet:  Carb Control 5 carbs/meal (2000kcals/day) and Low Sodium (2gm)    Routes of Feeding: Oral  Liquids: No Restrictions  Daily Fluid Restriction: yes - amount 1800mL  Last Modified Barium Swallow with Video (Video Swallowing Test): not done    Treatments at the Time of Hospital Discharge:   Respiratory Treatments: N/A  Oxygen Therapy:  is not on home oxygen therapy. Ventilator:    - No ventilator support    Rehab Therapies: Physical Therapy and Occupational Therapy  Weight Bearing Status/Restrictions: No weight bearing restirctions  Other Medical Equipment (for information only, NOT a DME order):  walker  Other Treatments: [facility]    Patient's personal belongings (please select all that are sent with patient):  Glasses, Dentures upper and lower, Jewelry    RN SIGNATURE:  Electronically signed by Beckie Feliciano RN on 9/4/21 at 12:03 PM EDT    CASE MANAGEMENT/SOCIAL WORK SECTION    Inpatient Status Date: ***    Readmission Risk Assessment Score:  Readmission Risk              Risk of Unplanned Readmission:  18           Discharging to Facility/ Agency   · Name:   · Address:  · Phone:  · Fax:    Dialysis Facility (if applicable)   · Name:  · Address:  · Dialysis Schedule:  · Phone:  · Fax:    / signature: {Esignature:[id number]:::0}    PHYSICIAN SECTION    Prognosis: Fair    Condition at Discharge: Stable    Rehab Potential (if transferring to Rehab): Fair    Recommended Labs or Other Treatments After Discharge: Home with home care     Physician Certification: I certify the above information and transfer of Shellie Forrest  is necessary for the continuing treatment of the diagnosis listed and that he requires [address] for less 30 days.      Update Admission H&P: No change in H&P    PHYSICIAN SIGNATURE:  Electronically signed by Sukhjinder Patricia MD on 9/4/21 at 9:39 AM EDT

## 2021-09-07 ENCOUNTER — TELEPHONE (OUTPATIENT)
Dept: TELEMETRY | Age: 81
End: 2021-09-07

## 2021-09-07 NOTE — TELEPHONE ENCOUNTER
1st Attempt; No Answer- Left HIPAA compliant voicemail with Non-Urgent Heart Failure Resource Line number for call back. Writer aware of attempts to follow up appt with Selvin Canchola MD and awaiting returned call still. Pt and wife declined [address] and prefer to use outpt therapy which was arranged via [facility]. Will attempt call again later today.  Christal Enrique RN

## 2022-07-09 ENCOUNTER — APPOINTMENT (OUTPATIENT)
Dept: GENERAL RADIOLOGY | Age: 82
DRG: 291 | End: 2022-07-09
Payer: MEDICARE

## 2022-07-09 ENCOUNTER — APPOINTMENT (OUTPATIENT)
Dept: CT IMAGING | Age: 82
DRG: 291 | End: 2022-07-09
Payer: MEDICARE

## 2022-07-09 ENCOUNTER — HOSPITAL ENCOUNTER (INPATIENT)
Age: 82
LOS: 3 days | Discharge: HOME HEALTH | DRG: 291 | End: 2022-07-12
Attending: EMERGENCY MEDICINE | Admitting: HOSPITALIST
Payer: MEDICARE

## 2022-07-09 DIAGNOSIS — I50.9 ACUTE ON CHRONIC CONGESTIVE HEART FAILURE, UNSPECIFIED HEART FAILURE TYPE (HCC): Primary | ICD-10-CM

## 2022-07-09 DIAGNOSIS — J18.9 COMMUNITY ACQUIRED PNEUMONIA, UNSPECIFIED LATERALITY: ICD-10-CM

## 2022-07-09 DIAGNOSIS — J96.01 ACUTE RESPIRATORY FAILURE WITH HYPOXIA (HCC): ICD-10-CM

## 2022-07-09 PROBLEM — R50.9 ACUTE FEBRILE ILLNESS: Status: ACTIVE | Noted: 2022-07-09

## 2022-07-09 LAB
A/G RATIO: 1.2 (ref 1.1–2.2)
ALBUMIN SERPL-MCNC: 4.2 G/DL (ref 3.4–5)
ALP BLD-CCNC: 53 U/L (ref 40–129)
ALT SERPL-CCNC: 6 U/L (ref 10–40)
ANION GAP SERPL CALCULATED.3IONS-SCNC: 15 MMOL/L (ref 3–16)
AST SERPL-CCNC: 22 U/L (ref 15–37)
BACTERIA: ABNORMAL /HPF
BASE EXCESS VENOUS: 4.8 MMOL/L (ref -3–3)
BASOPHILS ABSOLUTE: 0.1 K/UL (ref 0–0.2)
BASOPHILS RELATIVE PERCENT: 0.8 %
BILIRUB SERPL-MCNC: 1 MG/DL (ref 0–1)
BILIRUBIN URINE: NEGATIVE
BLOOD, URINE: NEGATIVE
BUN BLDV-MCNC: 36 MG/DL (ref 7–20)
CALCIUM SERPL-MCNC: 10 MG/DL (ref 8.3–10.6)
CARBOXYHEMOGLOBIN: 1.6 % (ref 0–1.5)
CHLORIDE BLD-SCNC: 98 MMOL/L (ref 99–110)
CLARITY: CLEAR
CO2: 26 MMOL/L (ref 21–32)
COLOR: YELLOW
CREAT SERPL-MCNC: 1.5 MG/DL (ref 0.8–1.3)
EOSINOPHILS ABSOLUTE: 0 K/UL (ref 0–0.6)
EOSINOPHILS RELATIVE PERCENT: 0.4 %
EPITHELIAL CELLS, UA: ABNORMAL /HPF (ref 0–5)
GFR AFRICAN AMERICAN: 54
GFR NON-AFRICAN AMERICAN: 45
GLUCOSE BLD-MCNC: 160 MG/DL (ref 70–99)
GLUCOSE BLD-MCNC: 163 MG/DL (ref 70–99)
GLUCOSE URINE: NEGATIVE MG/DL
HCO3 VENOUS: 29.9 MMOL/L (ref 23–29)
HCT VFR BLD CALC: 39.1 % (ref 40.5–52.5)
HEMOGLOBIN: 12.7 G/DL (ref 13.5–17.5)
HYALINE CASTS: ABNORMAL /LPF (ref 0–2)
INR BLD: 1.76 (ref 0.87–1.14)
KETONES, URINE: NEGATIVE MG/DL
LEUKOCYTE ESTERASE, URINE: NEGATIVE
LIPASE: 34 U/L (ref 13–60)
LYMPHOCYTES ABSOLUTE: 0.4 K/UL (ref 1–5.1)
LYMPHOCYTES RELATIVE PERCENT: 4.8 %
MCH RBC QN AUTO: 30.3 PG (ref 26–34)
MCHC RBC AUTO-ENTMCNC: 32.5 G/DL (ref 31–36)
MCV RBC AUTO: 93.1 FL (ref 80–100)
METHEMOGLOBIN VENOUS: 0.8 %
MICROSCOPIC EXAMINATION: YES
MONOCYTES ABSOLUTE: 0.3 K/UL (ref 0–1.3)
MONOCYTES RELATIVE PERCENT: 3.4 %
NEUTROPHILS ABSOLUTE: 8 K/UL (ref 1.7–7.7)
NEUTROPHILS RELATIVE PERCENT: 90.6 %
NITRITE, URINE: NEGATIVE
O2 SAT, VEN: 94 %
O2 THERAPY: ABNORMAL
PCO2, VEN: 46.3 MMHG (ref 40–50)
PDW BLD-RTO: 15.5 % (ref 12.4–15.4)
PERFORMED ON: ABNORMAL
PH UA: 6 (ref 5–8)
PH VENOUS: 7.43 (ref 7.35–7.45)
PLATELET # BLD: 220 K/UL (ref 135–450)
PMV BLD AUTO: 9.4 FL (ref 5–10.5)
PO2, VEN: 68.9 MMHG (ref 25–40)
POTASSIUM SERPL-SCNC: 3.9 MMOL/L (ref 3.5–5.1)
PRO-BNP: ABNORMAL PG/ML (ref 0–449)
PROCALCITONIN: 0.18 NG/ML (ref 0–0.15)
PROTEIN UA: ABNORMAL MG/DL
PROTHROMBIN TIME: 20.4 SEC (ref 11.7–14.5)
RAPID INFLUENZA  B AGN: NEGATIVE
RAPID INFLUENZA A AGN: NEGATIVE
RBC # BLD: 4.2 M/UL (ref 4.2–5.9)
RBC UA: ABNORMAL /HPF (ref 0–4)
RENAL EPITHELIAL, UA: ABNORMAL /HPF (ref 0–1)
SARS-COV-2, NAAT: NOT DETECTED
SODIUM BLD-SCNC: 139 MMOL/L (ref 136–145)
SPECIFIC GRAVITY UA: 1.02 (ref 1–1.03)
TCO2 CALC VENOUS: 31 MMOL/L
TOTAL PROTEIN: 7.8 G/DL (ref 6.4–8.2)
TROPONIN: 0.06 NG/ML
URINE TYPE: ABNORMAL
UROBILINOGEN, URINE: 1 E.U./DL
WBC # BLD: 8.8 K/UL (ref 4–11)
WBC UA: ABNORMAL /HPF (ref 0–5)

## 2022-07-09 PROCEDURE — 84145 PROCALCITONIN (PCT): CPT

## 2022-07-09 PROCEDURE — 2700000000 HC OXYGEN THERAPY PER DAY

## 2022-07-09 PROCEDURE — 71045 X-RAY EXAM CHEST 1 VIEW: CPT

## 2022-07-09 PROCEDURE — 80162 ASSAY OF DIGOXIN TOTAL: CPT

## 2022-07-09 PROCEDURE — 6360000004 HC RX CONTRAST MEDICATION: Performed by: PHYSICIAN ASSISTANT

## 2022-07-09 PROCEDURE — 85610 PROTHROMBIN TIME: CPT

## 2022-07-09 PROCEDURE — 6370000000 HC RX 637 (ALT 250 FOR IP): Performed by: HOSPITALIST

## 2022-07-09 PROCEDURE — 2580000003 HC RX 258: Performed by: EMERGENCY MEDICINE

## 2022-07-09 PROCEDURE — 84484 ASSAY OF TROPONIN QUANT: CPT

## 2022-07-09 PROCEDURE — 87804 INFLUENZA ASSAY W/OPTIC: CPT

## 2022-07-09 PROCEDURE — 2580000003 HC RX 258: Performed by: HOSPITALIST

## 2022-07-09 PROCEDURE — 6360000002 HC RX W HCPCS: Performed by: EMERGENCY MEDICINE

## 2022-07-09 PROCEDURE — 94761 N-INVAS EAR/PLS OXIMETRY MLT: CPT

## 2022-07-09 PROCEDURE — 83036 HEMOGLOBIN GLYCOSYLATED A1C: CPT

## 2022-07-09 PROCEDURE — 6360000002 HC RX W HCPCS: Performed by: PHYSICIAN ASSISTANT

## 2022-07-09 PROCEDURE — 1200000000 HC SEMI PRIVATE

## 2022-07-09 PROCEDURE — U0003 INFECTIOUS AGENT DETECTION BY NUCLEIC ACID (DNA OR RNA); SEVERE ACUTE RESPIRATORY SYNDROME CORONAVIRUS 2 (SARS-COV-2) (CORONAVIRUS DISEASE [COVID-19]), AMPLIFIED PROBE TECHNIQUE, MAKING USE OF HIGH THROUGHPUT TECHNOLOGIES AS DESCRIBED BY CMS-2020-01-R: HCPCS

## 2022-07-09 PROCEDURE — 74177 CT ABD & PELVIS W/CONTRAST: CPT

## 2022-07-09 PROCEDURE — 99285 EMERGENCY DEPT VISIT HI MDM: CPT

## 2022-07-09 PROCEDURE — 83690 ASSAY OF LIPASE: CPT

## 2022-07-09 PROCEDURE — 85025 COMPLETE CBC W/AUTO DIFF WBC: CPT

## 2022-07-09 PROCEDURE — 96365 THER/PROPH/DIAG IV INF INIT: CPT

## 2022-07-09 PROCEDURE — 83880 ASSAY OF NATRIURETIC PEPTIDE: CPT

## 2022-07-09 PROCEDURE — 87635 SARS-COV-2 COVID-19 AMP PRB: CPT

## 2022-07-09 PROCEDURE — 96367 TX/PROPH/DG ADDL SEQ IV INF: CPT

## 2022-07-09 PROCEDURE — 96375 TX/PRO/DX INJ NEW DRUG ADDON: CPT

## 2022-07-09 PROCEDURE — U0005 INFEC AGEN DETEC AMPLI PROBE: HCPCS

## 2022-07-09 PROCEDURE — 6360000002 HC RX W HCPCS: Performed by: HOSPITALIST

## 2022-07-09 PROCEDURE — 80053 COMPREHEN METABOLIC PANEL: CPT

## 2022-07-09 PROCEDURE — 36415 COLL VENOUS BLD VENIPUNCTURE: CPT

## 2022-07-09 PROCEDURE — 87040 BLOOD CULTURE FOR BACTERIA: CPT

## 2022-07-09 PROCEDURE — 82803 BLOOD GASES ANY COMBINATION: CPT

## 2022-07-09 PROCEDURE — 81001 URINALYSIS AUTO W/SCOPE: CPT

## 2022-07-09 RX ORDER — SODIUM CHLORIDE 0.9 % (FLUSH) 0.9 %
10 SYRINGE (ML) INJECTION EVERY 12 HOURS SCHEDULED
Status: DISCONTINUED | OUTPATIENT
Start: 2022-07-09 | End: 2022-07-13 | Stop reason: HOSPADM

## 2022-07-09 RX ORDER — ONDANSETRON 4 MG/1
4 TABLET, ORALLY DISINTEGRATING ORAL EVERY 8 HOURS PRN
Status: DISCONTINUED | OUTPATIENT
Start: 2022-07-09 | End: 2022-07-13 | Stop reason: HOSPADM

## 2022-07-09 RX ORDER — LOSARTAN POTASSIUM 25 MG/1
25 TABLET ORAL DAILY
Status: DISCONTINUED | OUTPATIENT
Start: 2022-07-10 | End: 2022-07-13 | Stop reason: HOSPADM

## 2022-07-09 RX ORDER — METRONIDAZOLE 500 MG/100ML
500 INJECTION, SOLUTION INTRAVENOUS EVERY 8 HOURS
Status: DISCONTINUED | OUTPATIENT
Start: 2022-07-09 | End: 2022-07-11

## 2022-07-09 RX ORDER — CARVEDILOL 6.25 MG/1
6.25 TABLET ORAL 2 TIMES DAILY WITH MEALS
Status: DISCONTINUED | OUTPATIENT
Start: 2022-07-10 | End: 2022-07-13 | Stop reason: HOSPADM

## 2022-07-09 RX ORDER — PRAVASTATIN SODIUM 40 MG
40 TABLET ORAL NIGHTLY
Status: DISCONTINUED | OUTPATIENT
Start: 2022-07-09 | End: 2022-07-13 | Stop reason: HOSPADM

## 2022-07-09 RX ORDER — SODIUM CHLORIDE 9 MG/ML
INJECTION, SOLUTION INTRAVENOUS PRN
Status: DISCONTINUED | OUTPATIENT
Start: 2022-07-09 | End: 2022-07-13 | Stop reason: HOSPADM

## 2022-07-09 RX ORDER — INSULIN GLARGINE 100 [IU]/ML
0.15 INJECTION, SOLUTION SUBCUTANEOUS NIGHTLY
Status: DISCONTINUED | OUTPATIENT
Start: 2022-07-09 | End: 2022-07-13 | Stop reason: HOSPADM

## 2022-07-09 RX ORDER — DIGOXIN 125 MCG
125 TABLET ORAL DAILY
Status: DISCONTINUED | OUTPATIENT
Start: 2022-07-10 | End: 2022-07-13 | Stop reason: HOSPADM

## 2022-07-09 RX ORDER — ACETAMINOPHEN 650 MG/1
650 SUPPOSITORY RECTAL EVERY 6 HOURS PRN
Status: DISCONTINUED | OUTPATIENT
Start: 2022-07-09 | End: 2022-07-13 | Stop reason: HOSPADM

## 2022-07-09 RX ORDER — DEXTROSE MONOHYDRATE 50 MG/ML
100 INJECTION, SOLUTION INTRAVENOUS PRN
Status: DISCONTINUED | OUTPATIENT
Start: 2022-07-09 | End: 2022-07-13 | Stop reason: HOSPADM

## 2022-07-09 RX ORDER — INSULIN LISPRO 100 [IU]/ML
0-6 INJECTION, SOLUTION INTRAVENOUS; SUBCUTANEOUS NIGHTLY
Status: DISCONTINUED | OUTPATIENT
Start: 2022-07-09 | End: 2022-07-13 | Stop reason: HOSPADM

## 2022-07-09 RX ORDER — POTASSIUM CHLORIDE 7.45 MG/ML
10 INJECTION INTRAVENOUS PRN
Status: DISCONTINUED | OUTPATIENT
Start: 2022-07-09 | End: 2022-07-13 | Stop reason: HOSPADM

## 2022-07-09 RX ORDER — POTASSIUM CHLORIDE 20 MEQ/1
40 TABLET, EXTENDED RELEASE ORAL PRN
Status: DISCONTINUED | OUTPATIENT
Start: 2022-07-09 | End: 2022-07-13 | Stop reason: HOSPADM

## 2022-07-09 RX ORDER — SODIUM CHLORIDE 9 MG/ML
INJECTION, SOLUTION INTRAVENOUS CONTINUOUS
Status: DISCONTINUED | OUTPATIENT
Start: 2022-07-09 | End: 2022-07-10

## 2022-07-09 RX ORDER — MAGNESIUM SULFATE IN WATER 40 MG/ML
2000 INJECTION, SOLUTION INTRAVENOUS PRN
Status: DISCONTINUED | OUTPATIENT
Start: 2022-07-09 | End: 2022-07-13 | Stop reason: HOSPADM

## 2022-07-09 RX ORDER — FERROUS SULFATE 325(65) MG
325 TABLET ORAL
Status: DISCONTINUED | OUTPATIENT
Start: 2022-07-10 | End: 2022-07-13 | Stop reason: HOSPADM

## 2022-07-09 RX ORDER — SODIUM CHLORIDE 0.9 % (FLUSH) 0.9 %
10 SYRINGE (ML) INJECTION PRN
Status: DISCONTINUED | OUTPATIENT
Start: 2022-07-09 | End: 2022-07-13 | Stop reason: HOSPADM

## 2022-07-09 RX ORDER — TORSEMIDE 20 MG/1
20 TABLET ORAL DAILY
Status: CANCELLED | OUTPATIENT
Start: 2022-07-10

## 2022-07-09 RX ORDER — FUROSEMIDE 10 MG/ML
40 INJECTION INTRAMUSCULAR; INTRAVENOUS ONCE
Status: COMPLETED | OUTPATIENT
Start: 2022-07-09 | End: 2022-07-09

## 2022-07-09 RX ORDER — PANTOPRAZOLE SODIUM 40 MG/1
40 TABLET, DELAYED RELEASE ORAL
Status: DISCONTINUED | OUTPATIENT
Start: 2022-07-10 | End: 2022-07-13 | Stop reason: HOSPADM

## 2022-07-09 RX ORDER — CITALOPRAM 20 MG/1
40 TABLET ORAL DAILY
Status: DISCONTINUED | OUTPATIENT
Start: 2022-07-10 | End: 2022-07-13 | Stop reason: HOSPADM

## 2022-07-09 RX ORDER — SENNA PLUS 8.6 MG/1
1 TABLET ORAL DAILY PRN
Status: DISCONTINUED | OUTPATIENT
Start: 2022-07-09 | End: 2022-07-13 | Stop reason: HOSPADM

## 2022-07-09 RX ORDER — ENOXAPARIN SODIUM 100 MG/ML
40 INJECTION SUBCUTANEOUS DAILY
Status: CANCELLED | OUTPATIENT
Start: 2022-07-09

## 2022-07-09 RX ORDER — VITAMIN B COMPLEX
1000 TABLET ORAL DAILY
Status: DISCONTINUED | OUTPATIENT
Start: 2022-07-10 | End: 2022-07-13 | Stop reason: HOSPADM

## 2022-07-09 RX ORDER — INSULIN LISPRO 100 [IU]/ML
0-12 INJECTION, SOLUTION INTRAVENOUS; SUBCUTANEOUS
Status: DISCONTINUED | OUTPATIENT
Start: 2022-07-10 | End: 2022-07-13 | Stop reason: HOSPADM

## 2022-07-09 RX ORDER — ACETAMINOPHEN 325 MG/1
650 TABLET ORAL EVERY 6 HOURS PRN
Status: DISCONTINUED | OUTPATIENT
Start: 2022-07-09 | End: 2022-07-13 | Stop reason: HOSPADM

## 2022-07-09 RX ORDER — ONDANSETRON 2 MG/ML
4 INJECTION INTRAMUSCULAR; INTRAVENOUS EVERY 6 HOURS PRN
Status: DISCONTINUED | OUTPATIENT
Start: 2022-07-09 | End: 2022-07-13 | Stop reason: HOSPADM

## 2022-07-09 RX ADMIN — IOPAMIDOL 75 ML: 755 INJECTION, SOLUTION INTRAVENOUS at 13:35

## 2022-07-09 RX ADMIN — PRAVASTATIN SODIUM 40 MG: 40 TABLET ORAL at 23:42

## 2022-07-09 RX ADMIN — CEFEPIME 2000 MG: 2 INJECTION, POWDER, FOR SOLUTION INTRAVENOUS at 23:49

## 2022-07-09 RX ADMIN — FUROSEMIDE 40 MG: 10 INJECTION, SOLUTION INTRAMUSCULAR; INTRAVENOUS at 15:10

## 2022-07-09 RX ADMIN — INSULIN LISPRO 1 UNITS: 100 INJECTION, SOLUTION INTRAVENOUS; SUBCUTANEOUS at 23:51

## 2022-07-09 RX ADMIN — CEFTRIAXONE SODIUM 1000 MG: 1 INJECTION, POWDER, FOR SOLUTION INTRAMUSCULAR; INTRAVENOUS at 17:58

## 2022-07-09 RX ADMIN — AZITHROMYCIN MONOHYDRATE 500 MG: 500 INJECTION, POWDER, LYOPHILIZED, FOR SOLUTION INTRAVENOUS at 18:04

## 2022-07-09 RX ADMIN — SODIUM CHLORIDE: 9 INJECTION, SOLUTION INTRAVENOUS at 23:46

## 2022-07-09 ASSESSMENT — PAIN - FUNCTIONAL ASSESSMENT: PAIN_FUNCTIONAL_ASSESSMENT: NONE - DENIES PAIN

## 2022-07-09 NOTE — H&P
HOSPITALISTS HISTORY AND PHYSICAL    7/9/2022 7:43 PM    Patient Information:  Rebekah Maria is a [de-identified] y.o. male [id number]  PCP:  Geoffrey Haas MD (Tel: [phone] )    Chief complaint:    Chief Complaint   Patient presents with    Fatigue     sx started yesterday.  Fever        History of Present Illness:  Davon Omalley is a [de-identified] y.o. male who presented to the ED to be evaluated for a multitude of complaints ongoing for 2 days PTA. He reports that he has had extreme fatigue, nausea and vomiting, and generalized myalgias of unknown cause. Overnight patient developed a fever documented as high as 101.5, treated with Tylenol this AM.  He also endorses increased dyspnea and intermittent cough. Patient is fully vaccinated against COVID-19. He is a non-smoker. Upon arrival to the ED CXR was obtained revealing cardiomegaly without overt failure. CT scan of the abdomen pelvis noted to be negative for ileus, obstruction, or inflammatory bowel wall thickening; trace ascites below right hepatic lobe appreciated. Influenza A/B and SARS COVID testing performed, with all results noted to be negative. Notable labs include: OLIVIER with BUNs/CR 36/1.  6, hyperglycemia 160, BNP 14,104, troponin 0.06, and procalcitonin 0.18. Patient received a dosage of Rocephin and Zithromax per ED provider, without collection of blood cultures prior to administration. History obtained from patient and review of [facility] chart    Old medical records show patient's most recent echo was performed on 9/2/2021 with the following results:  Summary   The left ventricular systolic function is moderate to severely reduced with   an ejection fraction of 25-30 %. There is hypokinesis of the apex, apical lateral, apical anterior and mid   anteroseptum walls. Moderate concentric left ventricular hypertrophy.    Grade II diastolic dysfunction cataract extraction (Left, 8/8/2019); Cataract removal with implant (Right, 09/05/2019); and Intracapsular cataract extraction (Right, 9/5/2019). Medications:  No current facility-administered medications on file prior to encounter. Current Outpatient Medications on File Prior to Encounter   Medication Sig Dispense Refill    torsemide (DEMADEX) 20 MG tablet Take 1 tablet by mouth daily 30 tablet 3    carvedilol (COREG) 6.25 MG tablet Take 1 tablet by mouth 2 times daily (with meals) 60 tablet 3    ferrous sulfate (FE TABS 325) 325 (65 Fe) MG EC tablet Take 1 tablet by mouth daily (with breakfast) 30 tablet 2    digoxin (LANOXIN) 125 MCG tablet TAKE 1 TABLET BY MOUTH EVERY DAY      metFORMIN (GLUCOPHAGE) 1000 MG tablet       losartan (COZAAR) 25 MG tablet TAKE 1 TABLET BY MOUTH  DAILY FOR CHRONIC HEART  FAILURE, HYPERTENSION      omeprazole (PRILOSEC) 20 MG delayed release capsule TAKE 1 CAPSULE BY MOUTH  DAILY FOR GASTROESOPHAGEAL  REFLUX DISEASE      pravastatin (PRAVACHOL) 40 MG tablet TAKE 1 TABLET BY MOUTH  DAILY      warfarin (COUMADIN) 2.5 MG tablet Take as directed by [facility]. Current warfarin dose 5 mg Mon/Wed/Sat; 2.5 mg all other days of the week.  vitamin D (CHOLECALCIFEROL) 25 MCG (1000 UT) TABS tablet Take 1,000 Units by mouth daily      Omega-3 Fatty Acids (FISH OIL) 1200 MG CPDR Take by mouth      nitroGLYCERIN (NITROSTAT) 0.4 MG SL tablet Place 1 tablet under the tongue every 5 minutes as needed for Chest pain 25 tablet 1    citalopram (CELEXA) 40 MG tablet Take 40 mg by mouth daily.  aspirin 81 MG tablet Take 81 mg by mouth daily          Allergies: Allergies   Allergen Reactions    Diclofenac Sodium Anaphylaxis     Anaphylaxis entered at [facility], date unknown. Patient currently takes Aspirin, tolerates oral Naproxen (2016) and BromSite (bromfenac) eye drops (2019).     Kiwi Extract Anaphylaxis    Adhesive Tape     Lipitor     Lisinopril     Lorazepam  Zocor [Simvastatin]         Social History:   reports that he quit smoking about 47 years ago. He has never used smokeless tobacco. He reports that he does not drink alcohol and does not use drugs. Family History:  family history is not on file.      Physical Exam:  /65   Pulse 72   Temp 97.2 °F (36.2 °C)   Resp 18   Wt 193 lb (87.5 kg)   SpO2 92%   BMI 24.12 kg/m²     General appearance: Pleasant elderly male resting in bed, appears acutely ill  Eyes: Sclera clear without conjunctival injection; PERRLA; EOMI  ENT: Mucous membranes moist without thrush; normal dentition  Neck: Supple without meningismus; no goiter; no carotid bruit bilaterally  Cardiovascular: Regular rhythm without ectopy; normal S1-S2 with no murmurs; 1+ pitting BLE peripheral edema; no JVD  Respiratory: Mild tachypnea; right mid basilar rhonchorous breath sounds present; faint crackles bilaterally  Gastrointestinal: Abdomen soft, diffusely tender, not distended; bowel sounds normal; no masses/organomegaly appreciated  Musculoskeletal: FROM spine and extremities x4; no gross deformity  Neurology: A&O x3; cranial nerves 2-12 grossly intact; motor 5/5  BUE/BLE; no seizure activity;   Psychiatry: Well-groomed with good eye contact; appropriate affect; no visual/auditory hallucination  Skin: Warm, dry, normal turgor, no rash  PV: 2/4 radial and dorsalis pedis bilaterally; brisk capillary refill    Labs:  CBC:   Lab Results   Component Value Date/Time    WBC 8.8 07/09/2022 11:07 AM    RBC 4.20 07/09/2022 11:07 AM    HGB 12.7 07/09/2022 11:07 AM    HCT 39.1 07/09/2022 11:07 AM    MCV 93.1 07/09/2022 11:07 AM    MCH 30.3 07/09/2022 11:07 AM    MCHC 32.5 07/09/2022 11:07 AM    RDW 15.5 07/09/2022 11:07 AM     07/09/2022 11:07 AM    MPV 9.4 07/09/2022 11:07 AM     BMP:    Lab Results   Component Value Date/Time     07/09/2022 11:07 AM    K 3.9 07/09/2022 11:07 AM    K 4.3 09/01/2021 12:57 PM    CL 98 07/09/2022 11:07 AM CO2 26 07/09/2022 11:07 AM    BUN 36 07/09/2022 11:07 AM    CREATININE 1.5 07/09/2022 11:07 AM    CALCIUM 10.0 07/09/2022 11:07 AM    GFRAA 54 07/09/2022 11:07 AM    LABGLOM 45 07/09/2022 11:07 AM    GLUCOSE 160 07/09/2022 11:07 AM     CT ABDOMEN PELVIS W IV CONTRAST Additional Contrast? None   Final Result   1. No ileus, obstruction or focal inflammatory bowel wall thickening. 2. Trace ascites seen just below the right hepatic lobe of uncertain   significance. 3. Cholecystectomy. The appendix is not well seen, with provided history of   prior appendectomy. Splenic me noted as well. 4. No urinary tract calculi or obstruction. 5. Atherosclerosis with coronary artery involvement. 6. Cardiomegaly with implanted cardiac device noted. XR CHEST PORTABLE   Final Result   1. Cardiomegaly without overt failure. EKG: Pending    I visualized CXR images and EKG strips personally and agree with documented interpretation    Discussed case  with ED provider    Problem List:  Principal Problem:    Acute febrile illness  Active Problems:    DM2 (diabetes mellitus, type 2) ([facility])    HTN (hypertension)    PAF (paroxysmal atrial fibrillation) ([facility])    Systolic CHF ([address]  Resolved Problems:    * No resolved hospital problems.  *        Consults:  PHARMACY TO DOSE MEDICATION  PHARMACY TO DOSE MEDICATION      Assessment/Plan:     Acute febrile illness  - Exam concerning for possible aspiration pneumonia following recent episodes of nausea and vomiting  -Patient admitted to telemetry floor with appropriate isolation measures in place  -Crystalloid IVF resuscitation initiated in ED, but not continued due to evidence of decompensated CHF and patient with EF of 25%  -Blood, urine cultures ordered STAT by admitting hospitalist provider  -Patient initiated on IV vancomycin, cefepime, and Flagyl empirically pending pathogen identification; pharmacy consulted for dosage assistance  -Serial CBC, CMP, lactate, procalcitonin to monitor treatment progression    Decompensated CHF  -Admit to floor for continuous telemetry monitoring and strict I's & O's  -IV Lasix 40 mg x 1 followed by initiation of Lasix GTT at 3 mg/H x12 hours  -Continue home doses of digoxin, Cozaar, Coreg, statin, and ASA  -ECHO scheduled to further assess cardiac structure and function  -2G Na and fluid restriction dietary modifications in place  -Consult placed to Cardiology for further recommendations    Type 2 DM  -A1c ordered with results pending at time of dictation  -Home metformin dosage placed on temporary hold  -Weight-based basal insulin initiated QHS  -PRN Humalog medium dose SSI scheduled before meals and at bedtime based on POC glucose  -Carbohydrate restriction placed on diet    DVT prophylaxis -Coumadin with pharmacy consulted for dosage assistance  Code status-full code  Diet-cardiac 2 g sodium with carb and fluid restriction  IV access-PIV established in ED      Admit as inpatient. I anticipate hospitalization spanning more than two midnights for investigation and treatment of the above medically necessary diagnoses. Comment: Please note this report has been produced using speech recognition software and may contain errors related to that system including errors in grammar, punctuation, and spelling, as well as words and phrases that may be inappropriate. If there are any questions or concerns please feel free to contact the dictating provider for clarification.          Gallo Odell MD    7/9/2022 7:43 PM

## 2022-07-09 NOTE — ED NOTES
Pt came in today with a one day hx of HA, fatigue and abdominal pain, N&V&D. Pt state that he started not feeling good yesterday. Just very tired and fatigue pt did walk with walker and steady gait however very fatigue once back in bed. Wife also state that when pt with SOB he also has some confusion. Wife feels that after walk pt had some confusion that with rest seems to get better MD aware. Wife state that pt with temp of 101 at home pt 100.8 on arrival pt was medicated per MAR. Pt also with some belching and upper gastric pain. Pt color pale on arrival pt oriented x4. Will cont to monitor wife at bedside.      Mara Guerrero RN  07/09/22 [id number]

## 2022-07-09 NOTE — ED NOTES
Patient identified as a positive fall risk on the ED triage fall screening. Patient placed in fall precautions which includes:  yellow fall risk bracelet on wrist and yellow socks on feet. Patient instructed on importance of not getting out of bed or ambulating without assistance for safety. Pt verbalized understanding.      Alex Childress RN  07/09/22 [id number]

## 2022-07-09 NOTE — ED PROVIDER NOTES
[facility] Emergency Department    CHIEF COMPLAINT  Fatigue (sx started yesterday.) and Fever      SHARED SERVICE VISIT  I have seen and evaluated this patient with my supervising physician, Dr. Tatiana Carias. HISTORY OF PRESENT ILLNESS  Amber Villareal is a [de-identified] y.o. male who presents to the ED complaining of 1 day history of headache, fatigue with abdominal pain, nausea, vomiting and diarrhea. Patient brought in by squad today for evaluation. Accompanied by wife. Patient reports he began feeling unwell yesterday. States that he has been tired and fatigued. She complaining of headaches without lightheadedness, dizziness or confusion. No visual changes or disturbances. Denies difficulty speaking or swallowing. Mild shortness of breath without chest pain. No cough or congestion. Fevers as high as 101 today. Noted at 10 AM.  Patient took dose of Tylenol prior to arrival.  States that he is feeling better. Reports epigastric pain associated with nausea and vomiting which also has appeared to resolved. No pain in the back. Denies any urinary symptoms or testicular pain. No leg pain or swelling. No recent travel, trauma or surgery. Denies pain with deep inspiration. No other complaints, modifying factors or associated symptoms. Nursing notes reviewed. Past Medical History:   Diagnosis Date    Acute MI ([address] 2016    Cancer ([address] 2002    non-hodgkins lymphoma    CHF (congestive heart failure) (HCC)     Diabetes mellitus ([address]     GERD (gastroesophageal reflux disease)     Hyperlipidemia     Hypertension     Neuropathy      Past Surgical History:   Procedure Laterality Date    APPENDECTOMY      CATARACT EXTRACTION W/  INTRAOCULAR LENS IMPLANT Left 08/08/2019    CATARACT EXTRACTION W/  INTRAOCULAR LENS IMPLANT Right 09/05/2019    [facility] EMULSIFICATION OF CATARACT WITH INTRAOCULAR LENS IMPLANT EYE (Right Eye) 9/5/19 w.  Dr Angelika Scanlon  COLONOSCOPY  10/7/13    polypectomy    CORONARY ANGIOPLASTY WITH STENT PLACEMENT      INTRACAPSULAR CATARACT EXTRACTION Left 2019    [facility] EMULSIFICATION OF CATARACT WITH INTRAOCULAR LENS IMPLANT EYE performed by Rubio Garcia MD at [address] Right 2019    [facility] EMULSIFICATION OF CATARACT WITH INTRAOCULAR LENS IMPLANT EYE performed by Rubio Garcia MD at [address] Left 2019    ICD with defib placed left side    PILONIDAL CYST EXCISION      SPLENECTOMY  2002    TUNNELED VENOUS PORT PLACEMENT      insertion and removal     No family history on file. Social History     Socioeconomic History    Marital status:      Spouse name: Not on file    Number of children: Not on file    Years of education: Not on file    Highest education level: Not on file   Occupational History    Not on file   Tobacco Use    Smoking status: Former Smoker     Quit date: 10/7/1974     Years since quittin.7    Smokeless tobacco: Never Used   Vaping Use    Vaping Use: Not on file   Substance and Sexual Activity    Alcohol use: No    Drug use: No    Sexual activity: Not on file   Other Topics Concern    Not on file   Social History Narrative    Not on file     Social Determinants of Health     Financial Resource Strain:     Difficulty of Paying Living Expenses: Not on file   Food Insecurity:     Worried About [address] in the Last Year: Not on file    [address] [facility] [address] in the Last Year: Not on file   Transportation Needs:     Lack of Transportation (Medical): Not on file    Lack of Transportation (Non-Medical):  Not on file   Physical Activity:     Days of Exercise per Week: Not on file    Minutes of Exercise per Session: Not on file   Stress:     Feeling of Stress : Not on file   Social Connections:     Frequency of Communication with Friends and Family: Not on file    Frequency of Social Gatherings with Friends and Family: Not on file    Attends Shinto Services: Not on file    Active Member of Clubs or Organizations: Not on file    Attends Club or Organization Meetings: Not on file    Marital Status: Not on file   Intimate Partner Violence:     Fear of Current or Ex-Partner: Not on file    Emotionally Abused: Not on file    Physically Abused: Not on file    Sexually Abused: Not on file   Housing Stability:     Unable to Pay for Housing in the Last Year: Not on file    Number of [place] in the Last Year: Not on file    Unstable Housing in the Last Year: Not on file     No current facility-administered medications for this encounter. Current Outpatient Medications   Medication Sig Dispense Refill    torsemide (DEMADEX) 20 MG tablet Take 1 tablet by mouth daily 30 tablet 3    carvedilol (COREG) 6.25 MG tablet Take 1 tablet by mouth 2 times daily (with meals) 60 tablet 3    ferrous sulfate (FE TABS 325) 325 (65 Fe) MG EC tablet Take 1 tablet by mouth daily (with breakfast) 30 tablet 2    digoxin (LANOXIN) 125 MCG tablet TAKE 1 TABLET BY MOUTH EVERY DAY      metFORMIN (GLUCOPHAGE) 1000 MG tablet       losartan (COZAAR) 25 MG tablet TAKE 1 TABLET BY MOUTH  DAILY FOR CHRONIC HEART  FAILURE, HYPERTENSION      omeprazole (PRILOSEC) 20 MG delayed release capsule TAKE 1 CAPSULE BY MOUTH  DAILY FOR GASTROESOPHAGEAL  REFLUX DISEASE      pravastatin (PRAVACHOL) 40 MG tablet TAKE 1 TABLET BY MOUTH  DAILY      warfarin (COUMADIN) 2.5 MG tablet Take as directed by [address] [place] [address]. Current warfarin dose 5 mg Mon/Wed/Sat; 2.5 mg all other days of the week.  vitamin D (CHOLECALCIFEROL) 25 MCG (1000 UT) TABS tablet Take 1,000 Units by mouth daily      Omega-3 Fatty Acids (FISH OIL) 1200 MG CPDR Take by mouth      nitroGLYCERIN (NITROSTAT) 0.4 MG SL tablet Place 1 tablet under the tongue every 5 minutes as needed for Chest pain 25 tablet 1    citalopram (CELEXA) 40 MG tablet Take 40 mg by mouth daily.       aspirin 81 MG tablet Take 81 mg by mouth daily        Allergies   Allergen Reactions    Diclofenac Sodium Anaphylaxis     Anaphylaxis entered at [address] [place] [address], date unknown. Patient currently takes Aspirin, tolerates oral Naproxen (2016) and BromSite (bromfenac) eye drops (2019).  Kiwi Extract Anaphylaxis    Adhesive Tape     Lipitor     Lisinopril     Lorazepam     Zocor [Simvastatin]        REVIEW OF SYSTEMS  10 systems reviewed, pertinent positives per HPI otherwise noted to be negative    PHYSICAL EXAM  /65   Pulse 72   Temp 97.2 °F (36.2 °C)   Resp 18   Wt 193 lb (87.5 kg)   SpO2 91%   BMI 24.12 kg/m²   GENERAL APPEARANCE: Awake and alert. Cooperative. No acute distress. HEAD: Normocephalic. Atraumatic. EYES: PERRL. EOM's grossly intact. ENT: Mucous membranes are moist.   NECK: Supple. No JVD. No tracheal tenderness or deviation. No crepitus. HEART: RRR. No murmurs. LUNGS: Respirations unlabored. No wheezing, rhonchi, rales. CTAB. Good air exchange. Speaking comfortably in full sentences. ABDOMEN: Soft. Non-distended. Mild epigastric and right-sided abdominal tenderness without rigidity, guarding or rebound. Negative Jackson's, McBurney's and Rovsing's. No fluids or ascites. No hernias or masses. Bowel sounds are mental quadrants. No CVA tenderness. EXTREMITIES: No peripheral edema. No unilateral calf pain, redness or swelling. Moves all extremities equally. All extremities neurovascularly intact. SKIN: Warm and dry. No acute rashes. NEUROLOGICAL: Alert and oriented. CN's 2-12 intact. No gross facial drooping. Strength 5/5, sensation intact. PSYCHIATRIC: Normal mood and affect.     RADIOLOGY  CT ABDOMEN PELVIS W IV CONTRAST Additional Contrast? None    Result Date: 7/9/2022  EXAMINATION: CT OF THE ABDOMEN AND PELVIS WITH CONTRAST 7/9/2022 1:22 pm TECHNIQUE: CT of the abdomen and pelvis was performed with the administration of intravenous contrast. Multiplanar reformatted images are provided for review. Automated exposure control, iterative reconstruction, and/or weight based adjustment of the mA/kV was utilized to reduce the radiation dose to as low as reasonably achievable. COMPARISON: None. HISTORY: ORDERING SYSTEM PROVIDED HISTORY: abd pain, n/v/d TECHNOLOGIST PROVIDED HISTORY: Reason for exam:->abd pain, n/v/d Additional Contrast?->None Decision Support Exception - unselect if not a suspected or confirmed emergency medical condition->Emergency Medical Condition (MA) Reason for Exam: fever, fatigue, abd, nausea since yesterday Relevant Medical/Surgical History: gb, appy, spenectomy FINDINGS: Lower Chest: Calcified granuloma seen in the right lower lobe. No focal consolidation or pleural effusion is identified. No pneumothorax. Cardiomegaly. Atrial and ventricular leads are noted. Coronary artery atherosclerosis. Organs: No enhancing mass identified within the liver. The portal veins are patent. Splenectomy. No adrenal mass. No pancreatic mass. No peripancreatic inflammatory process. No ductal dilation. No enhancing renal mass. No obstructive urinary tract calculi. GI/Bowel: Overall mild stool volume noted within the colon. Minimal diverticulosis. No ileus or obstruction. No focal inflammatory bowel wall thickening is identified. Pelvis: No pelvic mass. No significant prostate enlargement. No bladder wall thickening or adjacent inflammatory change. Mild to moderate stool noted within the rectum. No pelvic mass. No pelvic lymphadenopathy. Peritoneum/Retroperitoneum: Diffuse aortic atherosclerotic disease. No dissection. No retroperitoneal or mesenteric lymphadenopathy. No bowel herniation is identified. Bones/Soft Tissues: No acute subcutaneous soft tissue abnormality. No acute osseous abnormality. Degenerative changes are seen in the spine and sacroiliac joints. Postoperative changes seen related to prior lumbar laminectomy.   Multilevel disc disease as well as significant facet arthropathy, and multilevel osseous foraminal stenosis. 1. No ileus, obstruction or focal inflammatory bowel wall thickening. 2. Trace ascites seen just below the right hepatic lobe of uncertain significance. 3. Cholecystectomy. The appendix is not well seen, with provided history of prior appendectomy. Splenic me noted as well. 4. No urinary tract calculi or obstruction. 5. Atherosclerosis with coronary artery involvement. 6. Cardiomegaly with implanted cardiac device noted. XR CHEST PORTABLE    Result Date: 7/9/2022  EXAMINATION: ONE XRAY VIEW OF THE CHEST 7/9/2022 11:45 am COMPARISON: 09/01/2021. HISTORY: ORDERING SYSTEM PROVIDED HISTORY: nausea/vomiting TECHNOLOGIST PROVIDED HISTORY: Reason for exam:->nausea/vomiting Reason for Exam: fever, nausea FINDINGS: The heart size is enlarged but unchanged. The pulmonary vasculature is within normal limits. No acute infiltrates are seen. There is a left subclavian AICD. No pneumothoraces are noted. 1. Cardiomegaly without overt failure. Is this patient to be included in the SEP-1 Core Measure due to severe sepsis or septic shock? No   Exclusion criteria - the patient is NOT to be included for SEP-1 Core Measure due to:  2+ SIRS criteria are not met    ED COURSE  Pain control was not required while here in the emergency department. Triage vitals patient with O2 sats at 89%. Remainder vital stable. CBC without leukocytosis or anemia. CMP with baseline kidney dysfunction which appears unchanged according to chart review. Rapid COVID and flu negative. Urinalysis without evidence for infectious process. VBG without acidosis. Normal lipase. BNP of approximately 14,000. Given dose of Lasix. Troponin 0.06. Chest x-ray suggestive of cardiomegaly without evidence for overt failure or infiltrate. CT abdomen and pelvis without evidence for acute process. Patient did have elevated procalcitonin.   Initiated on Rocephin and azithromycin for suspected community-acquired pneumonia. Patient did become hypoxic with ambulation to 86% on room air. Does not wear home oxygen. Discussed recommendations for admission with hospital medicine and orders placed. A discussion was had with Mr. Abdi Mendez regarding shortness of breath and fevers, ED findings and recommendations for admission. Risk management discussed and shared decision making had with patient and/or surrogate. All questions were answered. Patient in agreement. 35 minutes of critical care time spent not including any separately billable procedures. MDM  Results for orders placed or performed during the hospital encounter of 07/09/22   COVID-19, Rapid    Specimen: Nasopharyngeal Swab   Result Value Ref Range    SARS-CoV-2, NAAT Not Detected Not Detected   Rapid influenza A/B antigens    Specimen: Nasopharyngeal   Result Value Ref Range    Rapid Influenza A Ag Negative Negative    Rapid Influenza B Ag Negative Negative   Blood gas, venous   Result Value Ref Range    pH, Eris 7.428 7.350 - 7.450    pCO2, Eris 46.3 40.0 - 50.0 mmHg    pO2, Eris 68.9 (H) 25.0 - 40.0 mmHg    HCO3, Venous 29.9 (H) 23.0 - 29.0 mmol/L    Base Excess, Eris 4.8 (H) -3.0 - 3.0 mmol/L    O2 Sat, Eris 94 Not Established %    Carboxyhemoglobin 1.6 (H) 0.0 - 1.5 %    MetHgb, Eris 0.8 <1.5 %    TC02 (Calc), Eris 31 Not Established mmol/L    O2 Therapy Unknown    CBC with Auto Differential   Result Value Ref Range    WBC 8.8 4.0 - 11.0 K/uL    RBC 4.20 4. 20 - 5.90 M/uL    Hemoglobin 12.7 (L) 13.5 - 17.5 g/dL    Hematocrit 39.1 (L) 40.5 - 52.5 %    MCV 93.1 80.0 - 100.0 fL    MCH 30.3 26.0 - 34.0 pg    MCHC 32.5 31.0 - 36.0 g/dL    RDW 15.5 (H) 12.4 - 15.4 %    Platelets 138 089 - 487 K/uL    MPV 9.4 5.0 - 10.5 fL    Neutrophils % 90.6 %    Lymphocytes % 4.8 %    Monocytes % 3.4 %    Eosinophils % 0.4 %    Basophils % 0.8 %    Neutrophils Absolute 8.0 (H) 1.7 - 7.7 K/uL    Lymphocytes Absolute 0.4 (L) 1.0 - 5.1 K/uL    Monocytes Absolute 0.3 0.0 - 1.3 K/uL    Eosinophils Absolute 0.0 0.0 - 0.6 K/uL    Basophils Absolute 0.1 0.0 - 0.2 K/uL   Comprehensive Metabolic Panel   Result Value Ref Range    Sodium 139 136 - 145 mmol/L    Potassium 3.9 3.5 - 5.1 mmol/L    Chloride 98 (L) 99 - 110 mmol/L    CO2 26 21 - 32 mmol/L    Anion Gap 15 3 - 16    Glucose 160 (H) 70 - 99 mg/dL    BUN 36 (H) 7 - 20 mg/dL    CREATININE 1.5 (H) 0.8 - 1.3 mg/dL    GFR Non-African American 45 (A) >60    GFR  54 (A) >60    Calcium 10.0 8.3 - 10.6 mg/dL    Total Protein 7.8 6.4 - 8.2 g/dL    Albumin 4.2 3.4 - 5.0 g/dL    Albumin/Globulin Ratio 1.2 1.1 - 2.2    Total Bilirubin 1.0 0.0 - 1.0 mg/dL    Alkaline Phosphatase 53 40 - 129 U/L    ALT 6 (L) 10 - 40 U/L    AST 22 15 - 37 U/L   Lipase   Result Value Ref Range    Lipase 34.0 13.0 - 60.0 U/L   Troponin   Result Value Ref Range    Troponin 0.06 (H) <0.01 ng/mL   Brain Natriuretic Peptide   Result Value Ref Range    Pro-BNP 14,104 (H) 0 - 449 pg/mL   Urinalysis   Result Value Ref Range    Color, UA Yellow Straw/Yellow    Clarity, UA Clear Clear    Glucose, Ur Negative Negative mg/dL    Bilirubin Urine Negative Negative    Ketones, Urine Negative Negative mg/dL    Specific Gravity, UA 1.020 1.005 - 1.030    Blood, Urine Negative Negative    pH, UA 6.0 5.0 - 8.0    Protein, UA TRACE (A) Negative mg/dL    Urobilinogen, Urine 1.0 <2.0 E.U./dL    Nitrite, Urine Negative Negative    Leukocyte Esterase, Urine Negative Negative    Microscopic Examination YES     Urine Type NotGiven    Microscopic Urinalysis   Result Value Ref Range    Hyaline Casts, UA 0-2 0 - 2 /LPF    WBC, UA 0-2 0 - 5 /HPF    RBC, UA None seen 0 - 4 /HPF    Epithelial Cells, UA 0-1 0 - 5 /HPF    Renal Epithelial, UA 0-1 0 - 1 /HPF    Bacteria, UA 1+ (A) None Seen /HPF   Procalcitonin   Result Value Ref Range    Procalcitonin 0.18 (H) 0.00 - 0.15 ng/mL     I spoke with Antionette Dougherty and Bianca Morrison.  We thoroughly discussed the history, physical exam, laboratory and imaging studies, as well as, emergency department course. Based upon that discussion, we've decided to admit Vishal Fair to the hospital for further observation, evaluation, and treatment. Final Impression  1. Acute on chronic congestive heart failure, unspecified heart failure type ([address]    2. Acute respiratory failure with hypoxia (HCC)    3. Community acquired pneumonia, unspecified laterality      Blood pressure 126/65, pulse 72, temperature 97.2 °F (36.2 °C), resp. rate 18, weight 193 lb (87.5 kg), SpO2 91 %. DISPOSITION  Patient was admitted to the hospital in stable condition.           Anthony Glez  07/09/22 2035       MADISON Glez  07/09/22 2036

## 2022-07-09 NOTE — ED PROVIDER NOTES
I independently performed a history and physical on Hattie Sharma. All diagnostic, treatment, and disposition decisions were made by myself in conjunction with the advanced practice provider. For further details of [facility] emergency department encounter, please see MADISON Chambers's documentation. Patient is here because of generalized fatigue and some nausea and vomiting and diarrhea. He started feeling poorly yesterday. He does have some shortness of breath and general confusion. On exam heart is regular rate and rhythm and lungs with scattered rales. I personally saw this patient and performed a substantive portion of the visit including all aspects of the medical decision making. MDM  Patient's labs and imaging are not particularly revealing. Given patient's fever and his new hypoxia however we are treating him for pneumonia. He does also have CHF. I personally saw this patient and independently provided 10 minutes of non-concurrent critical care out of the total shared critical care time provided.        Yani Rudd MD  07/10/22 [id number]

## 2022-07-10 LAB
A/G RATIO: 1.3 (ref 1.1–2.2)
ALBUMIN SERPL-MCNC: 3.8 G/DL (ref 3.4–5)
ALP BLD-CCNC: 46 U/L (ref 40–129)
ALT SERPL-CCNC: 7 U/L (ref 10–40)
ANION GAP SERPL CALCULATED.3IONS-SCNC: 11 MMOL/L (ref 3–16)
AST SERPL-CCNC: 27 U/L (ref 15–37)
BASOPHILS ABSOLUTE: 0.1 K/UL (ref 0–0.2)
BASOPHILS RELATIVE PERCENT: 1.1 %
BILIRUB SERPL-MCNC: 0.5 MG/DL (ref 0–1)
BUN BLDV-MCNC: 34 MG/DL (ref 7–20)
CALCIUM SERPL-MCNC: 9.5 MG/DL (ref 8.3–10.6)
CHLORIDE BLD-SCNC: 99 MMOL/L (ref 99–110)
CO2: 30 MMOL/L (ref 21–32)
CREAT SERPL-MCNC: 1.6 MG/DL (ref 0.8–1.3)
DIGOXIN LEVEL: 0.7 NG/ML (ref 0.8–2)
EOSINOPHILS ABSOLUTE: 0 K/UL (ref 0–0.6)
EOSINOPHILS RELATIVE PERCENT: 0.1 %
ESTIMATED AVERAGE GLUCOSE: 131.2 MG/DL
GFR AFRICAN AMERICAN: 51
GFR NON-AFRICAN AMERICAN: 42
GLUCOSE BLD-MCNC: 108 MG/DL (ref 70–99)
GLUCOSE BLD-MCNC: 115 MG/DL (ref 70–99)
GLUCOSE BLD-MCNC: 125 MG/DL (ref 70–99)
GLUCOSE BLD-MCNC: 166 MG/DL (ref 70–99)
GLUCOSE BLD-MCNC: 218 MG/DL (ref 70–99)
HBA1C MFR BLD: 6.2 %
HCT VFR BLD CALC: 36.7 % (ref 40.5–52.5)
HEMOGLOBIN: 11.9 G/DL (ref 13.5–17.5)
INR BLD: 1.65 (ref 0.87–1.14)
LACTIC ACID, SEPSIS: 1 MMOL/L (ref 0.4–1.9)
LACTIC ACID, SEPSIS: 1.4 MMOL/L (ref 0.4–1.9)
LYMPHOCYTES ABSOLUTE: 1 K/UL (ref 1–5.1)
LYMPHOCYTES RELATIVE PERCENT: 10.7 %
MCH RBC QN AUTO: 30.4 PG (ref 26–34)
MCHC RBC AUTO-ENTMCNC: 32.4 G/DL (ref 31–36)
MCV RBC AUTO: 94 FL (ref 80–100)
MONOCYTES ABSOLUTE: 0.3 K/UL (ref 0–1.3)
MONOCYTES RELATIVE PERCENT: 3.7 %
NEUTROPHILS ABSOLUTE: 8 K/UL (ref 1.7–7.7)
NEUTROPHILS RELATIVE PERCENT: 84.4 %
PDW BLD-RTO: 15.4 % (ref 12.4–15.4)
PERFORMED ON: ABNORMAL
PLATELET # BLD: 185 K/UL (ref 135–450)
PMV BLD AUTO: 9.1 FL (ref 5–10.5)
POTASSIUM REFLEX MAGNESIUM: 3.6 MMOL/L (ref 3.5–5.1)
PROTHROMBIN TIME: 19.4 SEC (ref 11.7–14.5)
RBC # BLD: 3.9 M/UL (ref 4.2–5.9)
SODIUM BLD-SCNC: 140 MMOL/L (ref 136–145)
TOTAL PROTEIN: 6.7 G/DL (ref 6.4–8.2)
TROPONIN: 0.09 NG/ML
TROPONIN: 0.1 NG/ML
TROPONIN: 0.1 NG/ML
WBC # BLD: 9.5 K/UL (ref 4–11)

## 2022-07-10 PROCEDURE — 6370000000 HC RX 637 (ALT 250 FOR IP): Performed by: HOSPITALIST

## 2022-07-10 PROCEDURE — 97530 THERAPEUTIC ACTIVITIES: CPT

## 2022-07-10 PROCEDURE — 6360000002 HC RX W HCPCS: Performed by: INTERNAL MEDICINE

## 2022-07-10 PROCEDURE — 2500000003 HC RX 250 WO HCPCS: Performed by: HOSPITALIST

## 2022-07-10 PROCEDURE — 1200000000 HC SEMI PRIVATE

## 2022-07-10 PROCEDURE — 80053 COMPREHEN METABOLIC PANEL: CPT

## 2022-07-10 PROCEDURE — 94761 N-INVAS EAR/PLS OXIMETRY MLT: CPT

## 2022-07-10 PROCEDURE — 2580000003 HC RX 258: Performed by: INTERNAL MEDICINE

## 2022-07-10 PROCEDURE — 83605 ASSAY OF LACTIC ACID: CPT

## 2022-07-10 PROCEDURE — 85025 COMPLETE CBC W/AUTO DIFF WBC: CPT

## 2022-07-10 PROCEDURE — 2580000003 HC RX 258: Performed by: HOSPITALIST

## 2022-07-10 PROCEDURE — 6360000002 HC RX W HCPCS: Performed by: HOSPITALIST

## 2022-07-10 PROCEDURE — 6370000000 HC RX 637 (ALT 250 FOR IP): Performed by: INTERNAL MEDICINE

## 2022-07-10 PROCEDURE — 93005 ELECTROCARDIOGRAM TRACING: CPT | Performed by: PHYSICIAN ASSISTANT

## 2022-07-10 PROCEDURE — 97166 OT EVAL MOD COMPLEX 45 MIN: CPT

## 2022-07-10 PROCEDURE — 84484 ASSAY OF TROPONIN QUANT: CPT

## 2022-07-10 PROCEDURE — 36415 COLL VENOUS BLD VENIPUNCTURE: CPT

## 2022-07-10 PROCEDURE — 85610 PROTHROMBIN TIME: CPT

## 2022-07-10 PROCEDURE — 97116 GAIT TRAINING THERAPY: CPT

## 2022-07-10 PROCEDURE — 97162 PT EVAL MOD COMPLEX 30 MIN: CPT

## 2022-07-10 PROCEDURE — 2700000000 HC OXYGEN THERAPY PER DAY

## 2022-07-10 PROCEDURE — 6370000000 HC RX 637 (ALT 250 FOR IP): Performed by: PHYSICIAN ASSISTANT

## 2022-07-10 RX ORDER — WARFARIN SODIUM 5 MG/1
5 TABLET ORAL
Status: DISCONTINUED | OUTPATIENT
Start: 2022-07-10 | End: 2022-07-10

## 2022-07-10 RX ORDER — WARFARIN SODIUM 5 MG/1
5 TABLET ORAL
Status: COMPLETED | OUTPATIENT
Start: 2022-07-10 | End: 2022-07-10

## 2022-07-10 RX ADMIN — DIGOXIN 125 MCG: 125 TABLET ORAL at 09:22

## 2022-07-10 RX ADMIN — Medication 1000 UNITS: at 09:19

## 2022-07-10 RX ADMIN — VANCOMYCIN HYDROCHLORIDE 750 MG: 750 INJECTION, POWDER, LYOPHILIZED, FOR SOLUTION INTRAVENOUS at 17:58

## 2022-07-10 RX ADMIN — FERROUS SULFATE TAB 325 MG (65 MG ELEMENTAL FE) 325 MG: 325 (65 FE) TAB at 09:25

## 2022-07-10 RX ADMIN — METRONIDAZOLE 500 MG: 500 INJECTION, SOLUTION INTRAVENOUS at 06:30

## 2022-07-10 RX ADMIN — ACETAMINOPHEN 650 MG: 325 TABLET ORAL at 00:40

## 2022-07-10 RX ADMIN — INSULIN GLARGINE 13 UNITS: 100 INJECTION, SOLUTION SUBCUTANEOUS at 21:55

## 2022-07-10 RX ADMIN — ACETAMINOPHEN 650 MG: 325 TABLET ORAL at 10:47

## 2022-07-10 RX ADMIN — FUROSEMIDE 3 MG/HR: 10 INJECTION, SOLUTION INTRAMUSCULAR; INTRAVENOUS at 07:01

## 2022-07-10 RX ADMIN — CITALOPRAM HYDROBROMIDE 40 MG: 20 TABLET ORAL at 09:19

## 2022-07-10 RX ADMIN — INSULIN LISPRO 2 UNITS: 100 INJECTION, SOLUTION INTRAVENOUS; SUBCUTANEOUS at 21:56

## 2022-07-10 RX ADMIN — CEFEPIME 2000 MG: 2 INJECTION, POWDER, FOR SOLUTION INTRAVENOUS at 10:31

## 2022-07-10 RX ADMIN — METRONIDAZOLE 500 MG: 500 INJECTION, SOLUTION INTRAVENOUS at 14:51

## 2022-07-10 RX ADMIN — INSULIN LISPRO 2 UNITS: 100 INJECTION, SOLUTION INTRAVENOUS; SUBCUTANEOUS at 11:20

## 2022-07-10 RX ADMIN — WARFARIN SODIUM 5 MG: 5 TABLET ORAL at 02:09

## 2022-07-10 RX ADMIN — PANTOPRAZOLE SODIUM 40 MG: 40 TABLET, DELAYED RELEASE ORAL at 06:29

## 2022-07-10 RX ADMIN — WARFARIN SODIUM 5 MG: 5 TABLET ORAL at 17:45

## 2022-07-10 RX ADMIN — METRONIDAZOLE 500 MG: 500 INJECTION, SOLUTION INTRAVENOUS at 00:37

## 2022-07-10 RX ADMIN — VANCOMYCIN HYDROCHLORIDE 750 MG: 750 INJECTION, POWDER, LYOPHILIZED, FOR SOLUTION INTRAVENOUS at 02:08

## 2022-07-10 RX ADMIN — CEFEPIME 2000 MG: 2 INJECTION, POWDER, FOR SOLUTION INTRAVENOUS at 23:25

## 2022-07-10 RX ADMIN — CARVEDILOL 6.25 MG: 6.25 TABLET, FILM COATED ORAL at 17:45

## 2022-07-10 RX ADMIN — ACETAMINOPHEN 650 MG: 325 TABLET ORAL at 18:51

## 2022-07-10 RX ADMIN — INSULIN GLARGINE 13 UNITS: 100 INJECTION, SOLUTION SUBCUTANEOUS at 00:23

## 2022-07-10 RX ADMIN — ASPIRIN 325 MG: 325 TABLET, COATED ORAL at 09:19

## 2022-07-10 RX ADMIN — METRONIDAZOLE 500 MG: 500 INJECTION, SOLUTION INTRAVENOUS at 21:52

## 2022-07-10 RX ADMIN — PRAVASTATIN SODIUM 40 MG: 40 TABLET ORAL at 21:48

## 2022-07-10 RX ADMIN — SODIUM CHLORIDE, PRESERVATIVE FREE 10 ML: 5 INJECTION INTRAVENOUS at 00:04

## 2022-07-10 ASSESSMENT — PAIN DESCRIPTION - LOCATION
LOCATION: BACK
LOCATION: BACK

## 2022-07-10 ASSESSMENT — PAIN DESCRIPTION - DESCRIPTORS: DESCRIPTORS: ACHING

## 2022-07-10 ASSESSMENT — PAIN SCALES - GENERAL
PAINLEVEL_OUTOF10: 2
PAINLEVEL_OUTOF10: 3

## 2022-07-10 NOTE — PROGRESS NOTES
Physical Therapy  Facility/Department: [facility] C3 TELE/MED SURG/ONC  Physical Therapy Initial Assessment/Treatment    Name: Daysi Marroquin  : 1940  MRN: [id number]  Date of Service: 7/10/2022    Discharge Recommendations:  Subacute/Skilled Nursing Facility   PT Equipment Recommendations  Equipment Needed: No  Other: Defer to next level of care      Patient Diagnosis(es): The primary encounter diagnosis was Acute on chronic congestive heart failure, unspecified heart failure type ([facility] [address] Diagnoses of Acute respiratory failure with hypoxia ([facility] [address] and Community acquired pneumonia, unspecified laterality were also pertinent to this visit. Past Medical History:  has a past medical history of Acute MI ([facility] [address] Cancer ([facility] [address] CHF (congestive heart failure) ([facility] [address] Diabetes mellitus ([facility] [address] GERD (gastroesophageal reflux disease), Hyperlipidemia, Hypertension, and Neuropathy. Past Surgical History:  has a past surgical history that includes Splenectomy (); Cholecystectomy; Appendectomy; Pilonidal cyst excision; Tunneled venous port placement; Colonoscopy; Colonoscopy (10/7/13); Coronary angioplasty with stent; other surgical history (Left, 2019); Cataract removal with implant (Left, 2019); Intracapsular cataract extraction (Left, 2019); Cataract removal with implant (Right, 2019); and Intracapsular cataract extraction (Right, 2019). Assessment   Body Structures, Functions, Activity Limitations Requiring Skilled Therapeutic Intervention: Decreased functional mobility ; Decreased endurance;Decreased balance; Increased pain;Decreased posture;Decreased strength;Decreased safe awareness  Assessment: Pt is an [de-identified] y.o. male admitted to [facility] secondary to acute febrile illness and CHF (COVID r/o). Pt lives with wife in [facility] with level entry and reports he is typically MI with functional mobility and gait with RW although endorses frequent falls at home.  Pt is currently functioning below his baseline requiring CGA for bed mobility, Bill for t/f and gait x 20' with RW. Pt is limited by decreased activity tolerance, strength and balance. Initiated CHF education however will require futher formal education. Pt will benefit from continued skilled PT in acute care setting to address above deficits, recommend SNF at d/c d/t current deficits, limited physical assist at d/c and frequent falls. Treatment Diagnosis: Impaired balance and gait  Specific Instructions for Next Treatment: Progress mobility as tolerated  Therapy Prognosis: Good  Decision Making: Medium Complexity  Barriers to Learning: none  Requires PT Follow-Up: Yes  Activity Tolerance  Activity Tolerance: Patient tolerated treatment well;Patient limited by fatigue;Patient limited by endurance     Plan   Plan  Plan: 3-5 times per week  Specific Instructions for Next Treatment: Progress mobility as tolerated  Current Treatment Recommendations: Strengthening,Equipment evaluation, education, & procurement,Balance training,Gait training,Functional mobility training,Home exercise program,Neuromuscular re-education,Transfer training,Safety education & training,Therapeutic activities,Patient/Caregiver education & training,Endurance training  Safety Devices  Type of Devices: Gait belt,All guillermo prominences offloaded,All fall risk precautions in place,Patient at risk for falls,Chair alarm in place,Left in chair,Call light within reach,Nurse notified     Restrictions  Restrictions/Precautions  Restrictions/Precautions: Up as Tolerated,Fall Risk,Isolation  Position Activity Restriction  Other position/activity restrictions: 2L NC, male purewick, COVID R/O     Subjective   General  Chart Reviewed: Yes  Patient assessed for rehabilitation services?: Yes  Additional Pertinent Hx: Per Dr. Urszula Leone H&P [de-identified] y.o. male who presented to the ED to be evaluated for a multitude of complaints ongoing for 2 days PTA.   He reports that he has had extreme fatigue, nausea and vomiting, functional limits  Strength: Generally decreased, functional                 Bed mobility  Supine to Sit: Contact guard assistance (HOB elevated, use of BR, increased time to complete)  Sit to Supine: Unable to assess (Pt seated in chair at end of session)     Transfers  Sit to Stand: Minimal Assistance  Stand to sit: Minimal Assistance  Comment: EOB to RW Bill, cues for hand placement and anterior WS     Ambulation  Surface: level tile  Device: Rolling Walker  Other Apparatus: O2 (2L O2 NC)  Assistance: Minimal assistance  Quality of Gait: Slow partial step through pattern, B decreased toe clearance, B decreased heel strike, B decreased hip/knee flexion in swing and decreased hip extension in stance, forward flexed trunk, increased kyphosis and downward gaze. Pt unsteady requiring up to Bill for balance. Gait Deviations: Slow Juliana;Decreased step length;Decreased step height;Shuffles  Distance: 20'  Comments: distances limited by fatigue, HR 83, SpO2 94% on 2L O2 NC following gait.   More Ambulation?: No  Stairs/Curb  Stairs?: No        Balance  Posture: Fair  Sitting - Static: Good;-  Sitting - Dynamic: Fair;+  Standing - Static: Fair  Standing - Dynamic: Fair;-  Comments: CGA to Bill standing with RW             AM-PAC Score     AM-PAC Inpatient Mobility without Stair Climbing Raw Score : 15 (07/10/22 1654)  AM-PAC Inpatient without Stair Climbing T-Scale Score : 43.03 (07/10/22 1654)  Mobility Inpatient CMS 0-100% Score: 47.43 (07/10/22 1654)  Mobility Inpatient without Stair CMS G-Code Modifier : CK (07/10/22 1654)       Goals  Short Term Goals  Time Frame for Short term goals: 1 week 7/17/22 (unless otherwise specified)  Short term goal 1: Pt will complete supine to/from sit with S  Short term goal 2: Pt will complete sit to/from stand with RW with S  Short term goal 3: Pt will ambulate 48' with RW with S without LOB  Short term goal 4: 7/14/22: Pt will participate in 12-15 reps BLE exercises to increase strength and increase I with functional mobility and gait  Short term goal 5: Pt will meet goals 1-5 of HF education  Patient Goals   Patient goals : \"Get stronger\"       Education  Patient Education  Education Given To: Patient  Education Provided: Role of Therapy;Plan of Care;Precautions;Transfer Training;Energy Conservation  Education Provided Comments: Educated in importance of OOB mobility and gait, safe progression of activity and safe use of AD. Initiated CHF education but will require further formal education. Education Method: Demonstration;Verbal  Barriers to Learning: None  Education Outcome: Verbalized understanding;Continued education needed      Therapy Time   Individual Concurrent Group Co-treatment   Time In 1117         Time Out 1158         Minutes 41         Timed Code Treatment Minutes: 31 Minutes (10 minutes for eval)       If pt is unable to be seen after this session, please let this note serve as discharge summary. Please see case management note for discharge disposition. Thank you.     Fatimah Magaña, PT, DPT

## 2022-07-10 NOTE — PROGRESS NOTES
Pharmacy Note  Warfarin Consult  Dx: afib  Goal INR range 2-3   Home Warfarin dose: 2.5 mg Sat; 5 mg all other days of the week      Date                 INR                  Warfarin  7/9                   1.76                    5 mg   7/10                 1.65                    5 mg      Recommend warfarin 5 mg x 1 dose tonight. Daily INR ordered. Rx will continue to manage therapy per consult order.   Gordon Vega, PharmD 7/10/2022 8:44 AM

## 2022-07-10 NOTE — PROGRESS NOTES
4 Eyes Skin Assessment     The patient is being assess for  Admission    I agree that 2 RN's have performed a thorough Head to Toe Skin Assessment on the patient. ALL assessment sites listed below have been assessed. Areas assessed by both nurses: Camryn Torres / Aurora Gutiérrez  [x]   Head, Face, and Ears   [x]   Shoulders, Back, and Chest  [x]   Arms, Elbows, and Hands   [x]   Coccyx, Sacrum, and IschIum  [x]   Legs, Feet, and Heels        Does the Patient have Skin Breakdown?   No         [place] Prevention initiated:  Yes   Wound Care Orders initiated:  No      C nurse consulted for Pressure Injury (Stage 3,4, Unstageable, DTI, NWPT, and Complex wounds), New and Established Ostomies:  No      Nurse 1 eSignature: Electronically signed by Kita Garay RN on 7/10/22 at 7:30 AM EDT    **SHARE this note so that the co-signing nurse is able to place an eSignature**    Nurse 2 eSignature: Electronically signed by Antony Gamez RN on 7/10/22 at 7:31 AM EDT

## 2022-07-10 NOTE — ACP (ADVANCE CARE PLANNING)
Advance Care Planning     Advance Care Planning Inpatient Note  [facility]    Today's Date: 7/10/2022  Unit: [facility] C3 TELE/MED SURG/ONC    Received request from IDT Member. Upon review of chart and communication with care team, patient's decision making abilities are not in question. . Patient was/were present in the room during visit. Goals of ACP Conversation:  Discuss advance care planning documents    Health Care Decision Makers:       Primary Decision Maker: Ilia Joe - Spouse - [phone]    Secondary Decision Maker: Theresa Fuller Child - [phone]    Summary:  Verified Healthcare Decision [facility] Advance Care Planning Documents (Patient Wishes):  Living Will/Advance Directive. Spouse told staff they have living will. Patient confirmed this. I explained the purpose of bother documents. Assessment:  Patient currently in [facility] and family unable to visit. He has a good sense of humor. Patient affirmed his wife followed by his daughter would be his choice for health care power of . I provided education including the understanding that the proxy would only kick in if he were unable to make medical decisions. He was able to share his self awareness about his functional limitations. He stated he didn't like to talk about \"deep things\" when he was not feeling well. He shared his son lives out of state. Interventions:  Requested patient/family to submit existing document for our records: Living Will/Advance Directive  Provided education on documents for clarity and greater understanding  Discussed and provided education on state decision maker hierarchy    Care Preferences Communicated:   No    Outcomes/Plan:  ACP Discussion: ACP discussion initiated. I recommended meeting with his spouse and completing documents so his wishes will be in writing.   I also assured him that his spouse would be his proxy unless he stated otherwise. Spiritual Care will follow-up with patient and spouse. He was given contact information if he wants to work on documents before then. Will continue to assess any spiritual/emotional needs. Palliative Care would be a useful adjunct to consider in light of need to clarify goals of care and patient's wishes.     Electronically signed by Francisco Scruggs, [employer] on 7/10/2022 at 3:00 PM

## 2022-07-10 NOTE — PROGRESS NOTES
Occupational Therapy  Facility/Department: [facility] C3 TELE/MED SURG/ONC  Occupational Therapy Initial Assessment    Name: Laly Clayton  : 1940  MRN: [id number]  Date of Service: 7/10/2022    Discharge Recommendations:  Subacute/Skilled Nursing Facility  OT Equipment Recommendations  Equipment Needed:  (defer)     Patient Diagnosis(es): The primary encounter diagnosis was Acute on chronic congestive heart failure, unspecified heart failure type ([address] Diagnoses of Acute respiratory failure with hypoxia ([address] and Community acquired pneumonia, unspecified laterality were also pertinent to this visit. Past Medical History:  has a past medical history of Acute MI ([facility] [address] Cancer ([facility] [address] CHF (congestive heart failure) ([facility] [address] Diabetes mellitus ([facility] [address] GERD (gastroesophageal reflux disease), Hyperlipidemia, Hypertension, and Neuropathy. Past Surgical History:  has a past surgical history that includes Splenectomy (); Cholecystectomy; Appendectomy; Pilonidal cyst excision; Tunneled venous port placement; Colonoscopy; Colonoscopy (10/7/13); Coronary angioplasty with stent; other surgical history (Left, 2019); Cataract removal with implant (Left, 2019); Intracapsular cataract extraction (Left, 2019); Cataract removal with implant (Right, 2019); and Intracapsular cataract extraction (Right, 2019). Treatment Diagnosis: decreased ADL independence      Assessment   Performance deficits / Impairments: Decreased functional mobility ; Decreased ADL status; Decreased ROM; Decreased strength;Decreased safe awareness;Decreased endurance;Decreased sensation;Decreased posture  Assessment: [de-identified] y/o male adm with SOB n/d/v. PLOF ind in ADLs and mobility/transfers with AD. Pt reports multiple falls with RW over past year. Pt currently requiring 2LNC and CGA-min A for all functional mobility and transfers. Pt able to complete grooming task seated, but denied need for additional ADLs.  Pt generally deconditioned and would benefit from skilled OT during acute stay. Recommend SNF at discharge. Treatment Diagnosis: decreased ADL independence  Prognosis: Good  Decision Making: Medium Complexity  REQUIRES OT FOLLOW-UP: Yes  Activity Tolerance  Activity Tolerance: Patient Tolerated treatment well  Activity Tolerance Comments: vitals WFL, O2 sats WFL        Plan   Plan  Times per Week: 3-5x per week  Times per Day: Daily  Current Treatment Recommendations: Strengthening,Self-Care / ADL,Patient/Caregiver education & training,Endurance training,Functional mobility training,Safety education & training     Restrictions  Restrictions/Precautions  Restrictions/Precautions: Up as Tolerated,Fall Risk  Position Activity Restriction  Other position/activity restrictions: 2L NC, male mahesh    Subjective   General  Chart Reviewed: Darius Jaramillo and Physical,Progress Notes,Labs  Patient assessed for rehabilitation services?: Yes  Additional Pertinent Hx: \" Akosua Low is a [de-identified] y.o. male who presented to the ED to be evaluated for a multitude of complaints ongoing for 2 days PTA. He reports that he has had extreme fatigue, nausea and vomiting, and generalized myalgias of unknown cause. Overnight patient developed a fever documented as high as 101.5, treated with Tylenol this AM.  He also endorses increased dyspnea and intermittent cough. Patient is fully vaccinated against COVID-19. He is a non-smoker. \"  Response to previous treatment: Patient with no complaints from previous session  Family / Caregiver Present: No  Referring Practitioner: Emperatriz Mcgowan MD     Social/Functional History  Social/Functional History  Lives With: Spouse  Type of Home: [facility]  Home Layout: One level  Home Access: Level entry  Bathroom Shower/Tub: Walk-in shower  Bathroom Toilet: Handicap height  Bathroom Equipment: Shower chair,Grab bars in [employer] Home Equipment: Cory Soler numbness)  ADL  Grooming: Setup;Stand by assistance  Grooming Skilled Clinical Factors: completed in seated position for face washing and oral hygiene (mouthwash)  Toileting: Dependent/Total  Toileting Skilled Clinical Factors: male purewick; denied need for toileting                 Orientation  Overall Orientation Status: Within Normal Limits                  Education Given To: Patient  Education Provided: Role of Therapy;Transfer Training;Plan of Care;Energy Conservation; Fall Prevention Strategies  Education Provided Comments: importance of OOB activity, role of OT, safety during fx mobility,  Education Method: Demonstration;Verbal  Barriers to Learning: None  Education Outcome: Verbalized understanding;Demonstrated understanding;Continued education needed                      AM-PAC Score        AM-PAC Inpatient Daily Activity Raw Score: 13 (07/10/22 1548)  AM-PAC Inpatient ADL T-Scale Score : 32.03 (07/10/22 1548)  ADL Inpatient CMS 0-100% Score: 63.03 (07/10/22 1548)  ADL Inpatient CMS G-Code Modifier : CL (07/10/22 1548)    Goals  Short Term Goals  Time Frame for Short term goals: 1 week unless otherwise specified by 7/17/22  Short Term Goal 1: Pt will complete toilet transfer with SBA and LRAD  Short Term Goal 2: Pt will complete LBD with SBA  Short Term Goal 3: Pt will improve activity tolerance to complete [id number] with vitals WFL by 7/14  Short Term Goal 4: Pt will complete 2-3 grooming tasks in standing with SBA       Therapy Time   Individual Concurrent Group Co-treatment   Time In 1117         Time Out 1158         Minutes 41         Timed Code Treatment Minutes: 31 Minutes (10 minute evaluation)       Sindy James OT

## 2022-07-10 NOTE — PROGRESS NOTES
Shift assessment updated. Patient a/ox4. SpO2 93% on 2L NC. SOBOE and at rest. Lasix gtt infusing, paged PA for parameters on BP meds. /60. Bed alarm in use for patient safety. Call light in reach.

## 2022-07-10 NOTE — PROGRESS NOTES
PHYSICAL THERAPY HEART FAILURE EDUCATION:  PHYSICAL THERAPY HEART FAILURE EDUCATION GOALS:  1. Identifying HF Activity Zone with the Self Check Plan prior to exercises or walking    Patient educated in and demonstrated/verbalized understanding Identifying HF activity zone with the Self Check Plan referencing Red/Yellow/Green Light Symbol prior to exercises or walking  to appropriately determine daily activity level. 2.Rating self on TITUS scale of perceived exertion   Patient educated in and demonstrated and/or verbalized understanding Rating self on TITUS scale of perceived exertion  3. HF Therapeutic Exercises  Pt educated in and completed Therapeutic exercises (Supine, Seated ,Standing, walking) as indicated below for 1 set) to promote circulation and prevent complications of bedrest with patient verbalizes understanding of employing green zone, yellow zone and red zone to seek provider input and evaluation. 4. Daily Weight check/Stepping on Scale  Pt educated in importance of checking body weight daily. Barriers to completion of Daily weight check are identified with recommendations made or Patient demonstrates and/or verbalizes safety in:stepping up to weight self and complete downward gaze/head nod to read scale on standard scale  and safety stepping off scale. 5. Teach back of Elements of PT HF Education  Pt voices and demonstrates appropriate teach back of elements of Physical Therapy Heart Failure Education Program                        1)Heart Failure Zones Self Check Plan referencing Red/Yellow/Green Light Symbol with:  []Green Zone/All Clear:    physical activity is normal for you,    No new swelling in feet, ankles, legs or stomach,    No weight gain of more than 2-3 pounds,    No chest pain or worsening of shortness of breath.    (Continue daily: weight check, meds as directed, low salt eating, monitoring of fluid intake, balance activity, follow up visits)  [x]Yellow Zone/Caution:    Increased cough or shortness of breath with activity   Increased swelling in your feet, ankles, legs or stomach from baseline   Weight gain or loss of more than 2-3 pounds in 1 day.  Increase in the number of pillows needed while sleeping  (Check In!: You need to contact your doctor or provider as soon as possible)  []Red Zone/Medical Alert:   Unrelieved chest pain or shortness of breath, especially while resting   Increased Discomfort or swelling in the abdomen or lower body   Sudden weight gain of more than 5 pounds in a week   Increased cough with bubbly and/or pink sputum  (Warning: You need to be seen right away . If you cannot reach your physician, call 911)    2)Blanca Rating of Perceived Exertion (RPE) Scale     The Blanca rating scale ranges from 6 to 20, where 6 means \"no exertion at all\" and 20 means \"maximal exertion. \" The patient chooses the number that best describes their level of exertion. This will objectify the intensity level of the patient's activity, and the therapist can use this information to accelerate or decelerate activity levels to reach the desired range. The patient should appraise their feeling of exertion as honestly as possible, without thinking about what the actual physical load is. Their feeling of effort and exertion is important, and it should not be compared to the level of others. Patient's should target exercises for very light to moderate (9-11/20) for this phase of their rehab. Blanca RPE Scale    Activity Completed:  Gait EOB to chair with RW    []6  No exertion at all  []7  Extremely light  []8  [] 9  Very light (For a healthy person, it is like walking slowly at his or her own pace for some minutes)  []10  [x]11  Light  []12  []13  Somewhat hard (exercise, but it still feels OK to continue)  []14  []15  Hard (heavy)  []16  []17  Very hard (can still go on, but really has to push himself. It feels very heavy, and the person is very tired. )[]18  []19  Extremely hard (For most people this is the most strenuous exercise they have ever experienced.)  []20  Maximal exertion. 3) Pt educated in and completed Therapeutic exercise (as indicated below for 1 set) to promote circulation and prevent complications of bedrest with patient verbalizes understanding of employing green zone, yellow zone and red zone to seek provider input and evaluation. Educated patient in :  []Supine    Level 1: Bed Exercise 10-15 reps, 2x/day  1. []Ankle Pumps  2. []Heel Slides  3. []Hip Abduction  4. []Buttocks Squeeze  5. []Diaphragmatic Breathing with TA set  6. []Shoulder Shrugs  7. []Bicep Curl  8. []Hands open/close                          []Sitting    **NEEDS FURTHER EDUCATION**  Level 2: Seated Exercises 10-15 reps, 2x/day  1. []Toe raise/heel raise  2. []Long Arc Quad  3. []Seated March  4. []Seated Clamshell  5. []Diaphragmatic Breathing with TA set and BUE ER and IR  6. [x]Shoulder Shrugs  7. [x]Bicep Curls  8. [x]Hands open/close                         []Standing   Level 3: Standing Exercises 10-15 reps, 2x/day     1. []Sit to/from stand  2. []Standing march  3. []Standing side steps  4. []Standing bilateral heel raise  5. []Diaphragmatic breathing with TA set and BUE flex/ext  6. []Shoulder Shrugs  7. []Bicep Curls  8. []Hands open/close                        []WalkingLevel 1: Educated patient in initiating walking when in the Green zone and to Start with 2-5 minutes daily and work up to 10 minutes per day. Walk at a slow, comfortable pace with your exertion level Very Light (TITUS 9) to Light (TITUS 11)   You should be able to comfortably hold a conversation while walking. 4)Daily Weight check/Stepping on Scale  [x]Pt educated in importance of checking body weight daily and []demonstrate/[x]verbalizes safety in:stepping up to weigh self and complete downward gaze/head nod to read scale on standard scale  and safety stepping off scale.   []Barriers to completion of Daily weight check: Decreased balance      5)Pt voices and demonstrates appropriate teach back of Heart Failure Education Program with : use of the   []1. Identifying Appropriate Zone from HF Zone Self-Check Plan                          []2. Rating self on TITUS.  []3. Therapeutic exercise/walking program      [x]4. Importance of taking daily weight measurement             [x]5. Safely stepping on and off scale    [x]Pt will benefit from reinforcement of education due to   [x]Readiness to learn                               []Decreased cognition  []Language barrier                                  []Decreased vision/hearing  [x]First introduction to new information    [x]Requires intermittent cues  [x]Other: Limited education provided this date d/t other current medical comorbidities.     Education provided via:  [x]Oral instruction  []Demonstration  []Written handout      Nain Saleem PT, DPT

## 2022-07-10 NOTE — CONSULTS
Pharmacy Note  Warfarin Consult  Dx: afib  Goal INR range 2-3   Home Warfarin dose: 2.5 mg Sat; 5 mg all other days of the week     Date  INR  Warfarin      Pending INR for tonight. Daily INR ordered. Rx will continue to manage therapy per consult order.   Cory Reynolds, Pharm D.7/9/2022 10:37 PM

## 2022-07-10 NOTE — PROGRESS NOTES
Hospitalist Progress Note      PCP: Laura Guzman MD    Date of Admission: 7/9/2022    Chief Complaint:   Fatigue, fever    Hospital Course:   Vishal Fair is a [de-identified] y.o. male who presented to the ED to be evaluated for a multitude of complaints ongoing for 2 days PTA. He reports that he has had extreme fatigue, nausea and vomiting, and generalized myalgias of unknown cause. Overnight patient developed a fever documented as high as 101.5, treated with Tylenol this AM.  He also endorses increased dyspnea and intermittent cough. Patient is fully vaccinated against COVID-19. He is a non-smoker.     Upon arrival to the ED CXR was obtained revealing cardiomegaly without overt failure. CT scan of the abdomen pelvis noted to be negative for ileus, obstruction, or inflammatory bowel wall thickening; trace ascites below right hepatic lobe appreciated. Influenza A/B and SARS COVID testing performed, with all results noted to be negative. Notable labs include: OLIVIER with BUNs/CR 36/1.  6, hyperglycemia 160, BNP 14,104, troponin 0.06, and procalcitonin 0.18. Patient received a dosage of Rocephin and Zithromax per ED provider, without collection of blood cultures prior to administration.        Subjective:   Seen resting in bed still requiring supplemental however states feeling better. VSS. Afebrile.         Medications:  Reviewed    Infusion Medications    sodium chloride      dextrose       Scheduled Medications    aspirin  325 mg Oral Daily    carvedilol  6.25 mg Oral BID WC    citalopram  40 mg Oral Daily    digoxin  125 mcg Oral Daily    ferrous sulfate  325 mg Oral Daily with breakfast    losartan  25 mg Oral Daily    pantoprazole  40 mg Oral QAM AC    pravastatin  40 mg Oral Nightly    Vitamin D  1,000 Units Oral Daily    sodium chloride flush  10 mL IntraVENous 2 times per day    cefepime  2,000 mg IntraVENous Q12H    metroNIDAZOLE  500 mg IntraVENous Q8H    insulin lispro  0-12 Units SubCUTAneous TID     insulin lispro  0-6 Units SubCUTAneous Nightly    insulin glargine  0.15 Units/kg SubCUTAneous Nightly    vancomycin  750 mg IntraVENous Q18H    warfarin placeholder: dosing by pharmacy   Other RX Placeholder     PRN Meds: perflutren lipid microspheres, sodium chloride flush, sodium chloride, potassium chloride **OR** potassium alternative oral replacement **OR** potassium chloride, magnesium sulfate, senna, acetaminophen **OR** acetaminophen, ondansetron **OR** ondansetron, glucose, dextrose bolus **OR** dextrose bolus, glucagon (rDNA), dextrose      Intake/Output Summary (Last 24 hours) at 7/10/2022 2051  Last data filed at 7/10/2022 1900  Gross per 24 hour   Intake 928 ml   Output 850 ml   Net 78 ml       Physical Exam Performed:    /66   Pulse 69   Temp 98.3 °F (36.8 °C) (Oral)   Resp 20   Ht 6' 2\" (1.88 m)   Wt 193 lb (87.5 kg)   SpO2 97%   BMI 24.78 kg/m²     General appearance: No apparent distress, appears stated age and cooperative. HEENT: Pupils equal, round, and reactive to light. Conjunctivae/corneas clear. Neck: Supple, with full range of motion. No jugular venous distention. Trachea midline. Respiratory: On 2 L via nasal cannula. Normal respiratory effort. Diminished breath sounds bilaterally  Cardiovascular: Regular rate and rhythm with normal S1/S2 without murmurs, rubs or gallops. Abdomen: Soft, non-tender, non-distended with normal bowel sounds. Musculoskeletal: No clubbing, cyanosis or edema bilaterally. Full range of motion without deformity. Skin: Skin color, texture, turgor normal.  No rashes or lesions. Neurologic:  Neurovascularly intact without any focal sensory/motor deficits.  Cranial nerves: II-XII intact, grossly non-focal.  Psychiatric: Alert and oriented, thought content appropriate, normal insight  Capillary Refill: Brisk,3 seconds, normal   Peripheral Pulses: +2 palpable, equal bilaterally       Labs:   Recent Labs     07/09/22  1107 07/10/22  0623   WBC 8.8 9.5   HGB 12.7* 11.9*   HCT 39.1* 36.7*    185     Recent Labs     07/09/22  1107 07/10/22  0623    140   K 3.9 3.6   CL 98* 99   CO2 26 30   BUN 36* 34*   CREATININE 1.5* 1.6*   CALCIUM 10.0 9.5     Recent Labs     07/09/22  1107 07/10/22  0623   AST 22 27   ALT 6* 7*   BILITOT 1.0 0.5   ALKPHOS 53 46     Recent Labs     07/09/22  2241 07/10/22  0623   INR 1.76* 1.65*     Recent Labs     07/10/22  0623 07/10/22  1158 07/10/22  1829   TROPONINI 0.09* 0.10* 0.10*       Urinalysis:      Lab Results   Component Value Date/Time    NITRU Negative 07/09/2022 03:09 PM    WBCUA 0-2 07/09/2022 03:09 PM    BACTERIA 1+ 07/09/2022 03:09 PM    RBCUA None seen 07/09/2022 03:09 PM    BLOODU Negative 07/09/2022 03:09 PM    SPECGRAV 1.020 07/09/2022 03:09 PM    GLUCOSEU Negative 07/09/2022 03:09 PM       Radiology:  CT ABDOMEN PELVIS W IV CONTRAST Additional Contrast? None   Final Result   1. No ileus, obstruction or focal inflammatory bowel wall thickening. 2. Trace ascites seen just below the right hepatic lobe of uncertain   significance. 3. Cholecystectomy. The appendix is not well seen, with provided history of   prior appendectomy. Splenic me noted as well. 4. No urinary tract calculi or obstruction. 5. Atherosclerosis with coronary artery involvement. 6. Cardiomegaly with implanted cardiac device noted. XR CHEST PORTABLE   Final Result   1. Cardiomegaly without overt failure.              Consults  PHARMACY TO DOSE MEDICATION  PHARMACY TO DOSE MEDICATION  IP CONSULT TO CARDIOLOGY      Assessment/Plan:    Active Hospital Problems    Diagnosis     Acute febrile illness [R50.9]      Priority: Medium    HTN (hypertension) [I10]     PAF (paroxysmal atrial fibrillation) (HCC) [I48.0]     DM2 (diabetes mellitus, type 2) (HCC) [I96.6]     Systolic CHF ([facility] [address] [N69.08]      Acute febrile illness  - Exam concerning for possible aspiration pneumonia following recent episodes of nausea and vomiting  -Patient admitted to telemetry floor with appropriate isolation measures in place  -Crystalloid IVF resuscitation initiated in ED, but not continued due to evidence of decompensated CHF and patient with EF of 25%  -Blood, urine cultures ordered STAT by admitting hospitalist provider  -Patient initiated on IV vancomycin, cefepime, and Flagyl empirically pending pathogen identification; pharmacy consulted for dosage assistance  -Serial CBC, CMP, lactate, procalcitonin to monitor treatment progression     Decompensated CHF  -Admit to floor for continuous telemetry monitoring and strict I's & O's  -IV Lasix 40 mg x 1 followed by initiation of Lasix GTT at 3 mg/H x12 hours  -Continue home doses of digoxin, Cozaar, Coreg, statin, and ASA  -ECHO scheduled to further assess cardiac structure and function  -2G Na and fluid restriction dietary modifications in place  -Consult placed to Cardiology for further recommendations     Type 2 DM  -A1c 6.2  -Home metformin dosage placed on temporary hold  -Weight-based basal insulin initiated QHS  -PRN Humalog medium dose SSI scheduled before meals and at bedtime based on POC glucose  -Carbohydrate restriction placed on diet    Patient was discussed CODE STATUS. States he has a living will and wife is currently looking for it. He states given his current illness and if he were to stop breathing or his heart were to stop he would not like chest compressions or resuscitative medications. He states he has an ICD and would like defibrillation cardioversion. And he would like to be intubated and placed on the ventilator if necessary. CODE STATUS changed to limited. DVT Prophylaxis: Warfarin  Diet: ADULT DIET; Regular; 4 carb choices (60 gm/meal);  Low Fat/Low Chol/High Fiber/2 gm Na; 1800 ml  Code Status: Limited    PT/OT Eval Status: Pending    Dispo -1 to 2 days pending clinical course    Arsenio Haywood, [address]

## 2022-07-10 NOTE — CONSULTS
Pharmacy to Dose Vancomycin    Dx: CAP  Goal trough = 15-20  Pt wt = 87.5 kg  Recent Labs     07/09/22  1107   CREATININE 1.5*     Recent Labs     07/09/22  1107   WBC 8.8     Regimen: 750 mg IV every 18 hours.   Start time: 22:27 on 07/09/2022  Exposure target: AUC24 (range)400-600 mg/L.hr   AUC24,ss: 430 mg/L.hr  Probability of AUC24 > 400: 59 %  Ctrough,ss: 14.6 mg/L  Probability of Ctrough,ss > 20: 18 %  Probability of nephrotoxicity (Lodise CRYSTAL 2009): 10 %  Vancomycin trough: 7/11 [facility] 7/9/2022 10:30 PM

## 2022-07-10 NOTE — CARE COORDINATION
CASE MANAGEMENT INITIAL ASSESSMENT      Reviewed chart and completed assessment with patient: pt  Family present: none  Explained Case Management role/services. Primary contact information:    Health Care Decision Maker :   Primary Decision Maker: Bahman Dodd - Spouse - [phone]    Secondary Decision Maker: Aylin Hernandez Child - [phone]        Admit date/status: 7/9 IP  Diagnosis: acute febrile illness, r/o asp pneumonia, r/o covid  Is this a Readmission?:  No      Insurance:Medicare primary and [facility] secondary   Precert required for SNF: No       3 night stay required: No waived for pandemic    Living arrangements, Adls, care needs, prior to admission: Pt lives in one story condo with level entry. Mostly IPTA but sometimes requires assist with ADLS. Does not drive    Durable Medical Equipment at home:  Walker_x_Cane__RTS__ BSC__Shower Chair__  02__ HHN__ CPAP__  BiPap__  Hospital Bed__ W/C_x__ Other_____    Services in the home and/or outpatient, prior to admission: none    Current PCP: Gregory Yoon MD                               Medications: has prescription coverage  Transportation needs:  tbd    Dialysis Facility (if applicable) N/A    PT/OT recs: SNF    Hospital Exemption Notification (HEN): not initiated    Barriers to discharge: if covid +    Plan/comments: Spoke to pt via telephone. Pt states he has been extremely weak since this illness began and feels PT/OT recs for SNF will not be needed when he recovers. Has had HHC in past but it has been years. Discussed with pt that if he is covid + and needs SNF choices are limited. He has been to [facility] in past and would consider again. On o2 here and not at home. DCP following.     ECOC on chart for MD signature

## 2022-07-11 ENCOUNTER — APPOINTMENT (OUTPATIENT)
Dept: GENERAL RADIOLOGY | Age: 82
DRG: 291 | End: 2022-07-11
Payer: MEDICARE

## 2022-07-11 LAB
ANION GAP SERPL CALCULATED.3IONS-SCNC: 12 MMOL/L (ref 3–16)
BASOPHILS ABSOLUTE: 0 K/UL (ref 0–0.2)
BASOPHILS RELATIVE PERCENT: 0.1 %
BUN BLDV-MCNC: 38 MG/DL (ref 7–20)
C-REACTIVE PROTEIN: 111.3 MG/L (ref 0–5.1)
CALCIUM SERPL-MCNC: 9.6 MG/DL (ref 8.3–10.6)
CHLORIDE BLD-SCNC: 97 MMOL/L (ref 99–110)
CO2: 29 MMOL/L (ref 21–32)
CREAT SERPL-MCNC: 1.7 MG/DL (ref 0.8–1.3)
EOSINOPHILS ABSOLUTE: 0 K/UL (ref 0–0.6)
EOSINOPHILS RELATIVE PERCENT: 0.4 %
GFR AFRICAN AMERICAN: 47
GFR NON-AFRICAN AMERICAN: 39
GLUCOSE BLD-MCNC: 101 MG/DL (ref 70–99)
GLUCOSE BLD-MCNC: 102 MG/DL (ref 70–99)
GLUCOSE BLD-MCNC: 108 MG/DL (ref 70–99)
GLUCOSE BLD-MCNC: 121 MG/DL (ref 70–99)
GLUCOSE BLD-MCNC: 132 MG/DL (ref 70–99)
GLUCOSE BLD-MCNC: 156 MG/DL (ref 70–99)
HCT VFR BLD CALC: 37.4 % (ref 40.5–52.5)
HEMOGLOBIN: 12.1 G/DL (ref 13.5–17.5)
INR BLD: 2.16 (ref 0.87–1.14)
LYMPHOCYTES ABSOLUTE: 0.8 K/UL (ref 1–5.1)
LYMPHOCYTES RELATIVE PERCENT: 9.4 %
MCH RBC QN AUTO: 30.2 PG (ref 26–34)
MCHC RBC AUTO-ENTMCNC: 32.3 G/DL (ref 31–36)
MCV RBC AUTO: 93.4 FL (ref 80–100)
MONOCYTES ABSOLUTE: 0.4 K/UL (ref 0–1.3)
MONOCYTES RELATIVE PERCENT: 5 %
NEUTROPHILS ABSOLUTE: 7.5 K/UL (ref 1.7–7.7)
NEUTROPHILS RELATIVE PERCENT: 85.1 %
PDW BLD-RTO: 15.9 % (ref 12.4–15.4)
PERFORMED ON: ABNORMAL
PLATELET # BLD: 178 K/UL (ref 135–450)
PMV BLD AUTO: 10.3 FL (ref 5–10.5)
POTASSIUM REFLEX MAGNESIUM: 4.3 MMOL/L (ref 3.5–5.1)
PROCALCITONIN: 1.08 NG/ML (ref 0–0.15)
PROTHROMBIN TIME: 23.9 SEC (ref 11.7–14.5)
RBC # BLD: 4 M/UL (ref 4.2–5.9)
SARS-COV-2: NOT DETECTED
SODIUM BLD-SCNC: 138 MMOL/L (ref 136–145)
VANCOMYCIN TROUGH: 9.3 UG/ML (ref 10–20)
WBC # BLD: 8.9 K/UL (ref 4–11)

## 2022-07-11 PROCEDURE — 80048 BASIC METABOLIC PNL TOTAL CA: CPT

## 2022-07-11 PROCEDURE — 80202 ASSAY OF VANCOMYCIN: CPT

## 2022-07-11 PROCEDURE — 85025 COMPLETE CBC W/AUTO DIFF WBC: CPT

## 2022-07-11 PROCEDURE — 97110 THERAPEUTIC EXERCISES: CPT

## 2022-07-11 PROCEDURE — 36415 COLL VENOUS BLD VENIPUNCTURE: CPT

## 2022-07-11 PROCEDURE — 2580000003 HC RX 258: Performed by: INTERNAL MEDICINE

## 2022-07-11 PROCEDURE — 6370000000 HC RX 637 (ALT 250 FOR IP): Performed by: HOSPITALIST

## 2022-07-11 PROCEDURE — 84145 PROCALCITONIN (PCT): CPT

## 2022-07-11 PROCEDURE — 2700000000 HC OXYGEN THERAPY PER DAY

## 2022-07-11 PROCEDURE — 6370000000 HC RX 637 (ALT 250 FOR IP): Performed by: PHYSICIAN ASSISTANT

## 2022-07-11 PROCEDURE — 2580000003 HC RX 258: Performed by: HOSPITALIST

## 2022-07-11 PROCEDURE — 85610 PROTHROMBIN TIME: CPT

## 2022-07-11 PROCEDURE — 71046 X-RAY EXAM CHEST 2 VIEWS: CPT

## 2022-07-11 PROCEDURE — 93005 ELECTROCARDIOGRAM TRACING: CPT | Performed by: INTERNAL MEDICINE

## 2022-07-11 PROCEDURE — 94761 N-INVAS EAR/PLS OXIMETRY MLT: CPT

## 2022-07-11 PROCEDURE — 6360000002 HC RX W HCPCS: Performed by: HOSPITALIST

## 2022-07-11 PROCEDURE — 6360000002 HC RX W HCPCS: Performed by: INTERNAL MEDICINE

## 2022-07-11 PROCEDURE — 97530 THERAPEUTIC ACTIVITIES: CPT

## 2022-07-11 PROCEDURE — 86140 C-REACTIVE PROTEIN: CPT

## 2022-07-11 PROCEDURE — 6370000000 HC RX 637 (ALT 250 FOR IP): Performed by: INTERNAL MEDICINE

## 2022-07-11 PROCEDURE — 99223 1ST HOSP IP/OBS HIGH 75: CPT | Performed by: INTERNAL MEDICINE

## 2022-07-11 PROCEDURE — 1200000000 HC SEMI PRIVATE

## 2022-07-11 PROCEDURE — 2500000003 HC RX 250 WO HCPCS: Performed by: HOSPITALIST

## 2022-07-11 RX ORDER — ASPIRIN 81 MG/1
81 TABLET ORAL DAILY
Status: DISCONTINUED | OUTPATIENT
Start: 2022-07-12 | End: 2022-07-13 | Stop reason: HOSPADM

## 2022-07-11 RX ORDER — AMOXICILLIN AND CLAVULANATE POTASSIUM 500; 125 MG/1; MG/1
1 TABLET, FILM COATED ORAL EVERY 12 HOURS SCHEDULED
Status: DISCONTINUED | OUTPATIENT
Start: 2022-07-11 | End: 2022-07-13 | Stop reason: HOSPADM

## 2022-07-11 RX ORDER — TORSEMIDE 20 MG/1
20 TABLET ORAL DAILY
Status: DISCONTINUED | OUTPATIENT
Start: 2022-07-12 | End: 2022-07-13 | Stop reason: HOSPADM

## 2022-07-11 RX ADMIN — CEFEPIME 2000 MG: 2 INJECTION, POWDER, FOR SOLUTION INTRAVENOUS at 10:09

## 2022-07-11 RX ADMIN — SODIUM CHLORIDE, PRESERVATIVE FREE 10 ML: 5 INJECTION INTRAVENOUS at 08:38

## 2022-07-11 RX ADMIN — METRONIDAZOLE 500 MG: 500 INJECTION, SOLUTION INTRAVENOUS at 05:20

## 2022-07-11 RX ADMIN — CARVEDILOL 6.25 MG: 6.25 TABLET, FILM COATED ORAL at 08:38

## 2022-07-11 RX ADMIN — INSULIN GLARGINE 13 UNITS: 100 INJECTION, SOLUTION SUBCUTANEOUS at 20:50

## 2022-07-11 RX ADMIN — AMOXICILLIN AND CLAVULANATE POTASSIUM 1 TABLET: 500; 125 TABLET, FILM COATED ORAL at 20:04

## 2022-07-11 RX ADMIN — SODIUM CHLORIDE, PRESERVATIVE FREE 10 ML: 5 INJECTION INTRAVENOUS at 19:59

## 2022-07-11 RX ADMIN — FERROUS SULFATE TAB 325 MG (65 MG ELEMENTAL FE) 325 MG: 325 (65 FE) TAB at 08:38

## 2022-07-11 RX ADMIN — CITALOPRAM HYDROBROMIDE 40 MG: 20 TABLET ORAL at 08:38

## 2022-07-11 RX ADMIN — ACETAMINOPHEN 650 MG: 325 TABLET ORAL at 19:59

## 2022-07-11 RX ADMIN — INSULIN LISPRO 2 UNITS: 100 INJECTION, SOLUTION INTRAVENOUS; SUBCUTANEOUS at 12:21

## 2022-07-11 RX ADMIN — VANCOMYCIN HYDROCHLORIDE 750 MG: 750 INJECTION, POWDER, LYOPHILIZED, FOR SOLUTION INTRAVENOUS at 10:13

## 2022-07-11 RX ADMIN — ASPIRIN 325 MG: 325 TABLET, COATED ORAL at 08:38

## 2022-07-11 RX ADMIN — LOSARTAN POTASSIUM 25 MG: 25 TABLET, FILM COATED ORAL at 08:38

## 2022-07-11 RX ADMIN — DIGOXIN 125 MCG: 125 TABLET ORAL at 08:38

## 2022-07-11 RX ADMIN — PRAVASTATIN SODIUM 40 MG: 40 TABLET ORAL at 19:59

## 2022-07-11 RX ADMIN — Medication 1000 UNITS: at 08:38

## 2022-07-11 RX ADMIN — PANTOPRAZOLE SODIUM 40 MG: 40 TABLET, DELAYED RELEASE ORAL at 05:18

## 2022-07-11 ASSESSMENT — PAIN SCALES - GENERAL
PAINLEVEL_OUTOF10: 0

## 2022-07-11 NOTE — PROGRESS NOTES
Hospitalist Progress Note      PCP: Nick Johnson MD    Date of Admission: 7/9/2022    Chief Complaint:   Fatigue, fever    Hospital Course:   Dash Sousa is a [de-identified] y.o. male who presented to the ED to be evaluated for a multitude of complaints ongoing for 2 days PTA. He reports that he has had extreme fatigue, nausea and vomiting, and generalized myalgias of unknown cause. Overnight patient developed a fever documented as high as 101.5, treated with Tylenol this AM.  He also endorses increased dyspnea and intermittent cough. Patient is fully vaccinated against COVID-19. He is a non-smoker.     Upon arrival to the ED CXR was obtained revealing cardiomegaly without overt failure. CT scan of the abdomen pelvis noted to be negative for ileus, obstruction, or inflammatory bowel wall thickening; trace ascites below right hepatic lobe appreciated. Influenza A/B and SARS COVID testing performed, with all results noted to be negative. Notable labs include: OLIVIER with BUNs/CR 36/1.  6, hyperglycemia 160, BNP 14,104, troponin 0.06, and procalcitonin 0.18. Patient received a dosage of Rocephin and Zithromax per ED provider, without collection of blood cultures prior to administration.        Subjective:   Patient feels weak. Patient has difficulty with ambulation. No report of fever in last 24 hours. Urine culture negative. Blood culture negative. Chest x-ray repeated today negative. Pending CRP and procalcitonin. Patient will require SNF.       Medications:  Reviewed    Infusion Medications    sodium chloride      dextrose       Scheduled Medications    aspirin  325 mg Oral Daily    carvedilol  6.25 mg Oral BID WC    citalopram  40 mg Oral Daily    digoxin  125 mcg Oral Daily    ferrous sulfate  325 mg Oral Daily with breakfast    losartan  25 mg Oral Daily    pantoprazole  40 mg Oral QAM AC    pravastatin  40 mg Oral Nightly    Vitamin D  1,000 Units Oral Daily    sodium chloride flush  10 mL IntraVENous 2 times per day    cefepime  2,000 mg IntraVENous Q12H    metroNIDAZOLE  500 mg IntraVENous Q8H    insulin lispro  0-12 Units SubCUTAneous TID WC    insulin lispro  0-6 Units SubCUTAneous Nightly    insulin glargine  0.15 Units/kg SubCUTAneous Nightly    vancomycin  750 mg IntraVENous Q18H    warfarin placeholder: dosing by pharmacy   Other RX Placeholder     PRN Meds: perflutren lipid microspheres, sodium chloride flush, sodium chloride, potassium chloride **OR** potassium alternative oral replacement **OR** potassium chloride, magnesium sulfate, senna, acetaminophen **OR** acetaminophen, ondansetron **OR** ondansetron, glucose, dextrose bolus **OR** dextrose bolus, glucagon (rDNA), dextrose      Intake/Output Summary (Last 24 hours) at 7/11/2022 1403  Last data filed at 7/11/2022 0940  Gross per 24 hour   Intake 1582.78 ml   Output 900 ml   Net 682.78 ml       Physical Exam Performed:    /62   Pulse 85   Temp 98.6 °F (37 °C) (Oral)   Resp 20   Ht 6' 2\" (1.88 m)   Wt 194 lb 3.6 oz (88.1 kg)   SpO2 92%   BMI 24.94 kg/m²     General appearance: No apparent distress, appears stated age and cooperative. HEENT: Pupils equal, round, and reactive to light. Conjunctivae/corneas clear. Neck: Supple, with full range of motion. No jugular venous distention. Trachea midline. Respiratory: On 2 L via nasal cannula. Normal respiratory effort. Diminished breath sounds bilaterally  Cardiovascular: Regular rate and rhythm with normal S1/S2 without murmurs, rubs or gallops. Abdomen: Soft, non-tender, non-distended with normal bowel sounds. Musculoskeletal: No clubbing, cyanosis or edema bilaterally. Full range of motion without deformity. Skin: Skin color, texture, turgor normal.  No rashes or lesions. Neurologic:  Neurovascularly intact without any focal sensory/motor deficits.  Cranial nerves: II-XII intact, grossly non-focal.  Psychiatric: Alert and oriented, thought content appropriate, normal insight  Capillary Refill: Brisk,3 seconds, normal   Peripheral Pulses: +2 palpable, equal bilaterally       Labs:   Recent Labs     07/09/22  1107 07/10/22  0623 07/11/22  0624   WBC 8.8 9.5 8.9   HGB 12.7* 11.9* 12.1*   HCT 39.1* 36.7* 37.4*    185 178     Recent Labs     07/09/22  1107 07/10/22  0623 07/11/22  0624    140 138   K 3.9 3.6 4.3   CL 98* 99 97*   CO2 26 30 29   BUN 36* 34* 38*   CREATININE 1.5* 1.6* 1.7*   CALCIUM 10.0 9.5 9.6     Recent Labs     07/09/22  1107 07/10/22  0623   AST 22 27   ALT 6* 7*   BILITOT 1.0 0.5   ALKPHOS 53 46     Recent Labs     07/09/22  2241 07/10/22  0623 07/11/22  0624   INR 1.76* 1.65* 2.16*     Recent Labs     07/10/22  0623 07/10/22  1158 07/10/22  1829   TROPONINI 0.09* 0.10* 0.10*       Urinalysis:      Lab Results   Component Value Date/Time    NITRU Negative 07/09/2022 03:09 PM    WBCUA 0-2 07/09/2022 03:09 PM    BACTERIA 1+ 07/09/2022 03:09 PM    RBCUA None seen 07/09/2022 03:09 PM    BLOODU Negative 07/09/2022 03:09 PM    SPECGRAV 1.020 07/09/2022 03:09 PM    GLUCOSEU Negative 07/09/2022 03:09 PM       Radiology:  XR CHEST (2 VW)   Final Result   No radiographic evidence of acute pulmonary disease. Stable cardiomegaly. CT ABDOMEN PELVIS W IV CONTRAST Additional Contrast? None   Final Result   1. No ileus, obstruction or focal inflammatory bowel wall thickening. 2. Trace ascites seen just below the right hepatic lobe of uncertain   significance. 3. Cholecystectomy. The appendix is not well seen, with provided history of   prior appendectomy. Splenic me noted as well. 4. No urinary tract calculi or obstruction. 5. Atherosclerosis with coronary artery involvement. 6. Cardiomegaly with implanted cardiac device noted. XR CHEST PORTABLE   Final Result   1. Cardiomegaly without overt failure.              Consults  PHARMACY TO DOSE MEDICATION  PHARMACY TO DOSE MEDICATION  IP CONSULT TO CARDIOLOGY      Assessment/Plan:    Active Hospital Problems    Diagnosis     Acute febrile illness [R50.9]      Priority: Medium    HTN (hypertension) [I10]     PAF (paroxysmal atrial fibrillation) ([facility]) [I48.0]     DM2 (diabetes mellitus, type 2) ([facility]) [P77.3]     Systolic CHF ([facility]) [R06.22]      Acute febrile illness  Chest x-ray showed no pneumonia. Pending procalcitonin and CRP. -Crystalloid IVF resuscitation initiated in ED, but not continued due to evidence of decompensated CHF and patient with EF of 25%  -Patient initiated on IV vancomycin, cefepime, and Flagyl empirically. DC antibiotic. Start patient on Augmentin.  -Serial CBC, CMP, lactate, procalcitonin to monitor treatment progression     Decompensated CHF  -Admit to floor for continuous telemetry monitoring and strict I's & O's  -IV Lasix 40 mg x 1 followed by initiation of Lasix GTT at 3 mg/H x12 hours  -Continue home doses of digoxin, Cozaar, Coreg, statin, and ASA  -ECHO scheduled to further assess cardiac structure and function  -2G Na and fluid restriction dietary modifications in place  -Consult placed to Cardiology for further recommendations     Type 2 DM  -A1c 6.2  -Home metformin dosage placed on temporary hold  -Weight-based basal insulin initiated QHS  -PRN Humalog medium dose SSI scheduled before meals and at bedtime based on POC glucose  -Carbohydrate restriction placed on diet    Patient was discussed CODE STATUS. States he has a living will and wife is currently looking for it. He states given his current illness and if he were to stop breathing or his heart were to stop he would not like chest compressions or resuscitative medications. He states he has an ICD and would like defibrillation cardioversion. And he would like to be intubated and placed on the ventilator if necessary. CODE STATUS changed to limited. DVT Prophylaxis: Warfarin  Diet: ADULT DIET; Regular; 4 carb choices (60 gm/meal);  Low Fat/Low Chol/High Fiber/2 gm Na; 1800 ml  Code Status: Limited    PT/OT Eval Status skilled nursing facility placement    Dispo -1 to 2 days pending clinical course    Gavino Mustafa MD

## 2022-07-11 NOTE — PROGRESS NOTES
Pharmacy Note  Warfarin Consult  Dx: afib  Goal INR range 2-3   Home Warfarin dose: 2.5 mg Sat; 5 mg all other days of the week      Date                 INR                  Warfarin  7/9                   1.76                    5 mg   7/10                 1.65                    5 mg   7/11                 2.16                    0 mg     Recommend  to hold warfarin due to big jump in INR . Daily INR ordered. Rx will continue to manage therapy per consult order.   Shanna Hoffmann/Marlee. 7/11/22 11:24 AM EDT

## 2022-07-11 NOTE — CONSULTS
[address] [facility]  [phone]      Attending Physician: My Davies MD  Reason for Consultation/Chief Complaint: Acute heart failure    Subjective   History of Present Illness:  Akosua Low is an [de-identified] y.o. 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 The patient's wife was present at bedside during my encounter and assists with the history. The patient has been feeling short of breath for the last 2 weeks and believes that he has had some mild swelling in his lower legs over this time, but denies any weight gain and says that he has actually been losing weight. He typically notices the shortness of breath when he first gets up in the morning. He denies any associated chest pain, but has had a mild cough. He says that he has been compliant with his diuretic regimen. Three days ago, after eating lunch, he developed aching pain in his bilateral shoulders and felt nauseous, but did not vomit. He took his temperature and it was 101.3 F. After notifying his PCP of his symptoms, he was instructed to go to the ED for further evaluation. Since admission, the patient has had a T-max of 100.6F. He was initially hypoxic at 1 point had an oxygen requirement of 4L, but has since been weaned to 1L supplemental O2 via nasal cannula. EKG showed an AV-dual paced rhythm. His troponin T appears to be chronically elevated and has trended 0.06-->0.09-->0.10-->0. 10. Chest x-ray showed cardiomegaly with no evidence of overt heart failure. Pro-BNP was 14,104.   His WBC count was Historical Provider, MD   losartan (COZAAR) 25 MG tablet TAKE 1 TABLET BY MOUTH  DAILY FOR CHRONIC HEART  FAILURE, HYPERTENSION 11/20/20   Historical Provider, MD   omeprazole (PRILOSEC) 20 MG delayed release capsule TAKE 1 CAPSULE BY MOUTH  DAILY FOR GASTROESOPHAGEAL  REFLUX DISEASE 11/17/20   Historical Provider, MD   pravastatin (PRAVACHOL) 40 MG tablet TAKE 1 TABLET BY MOUTH  DAILY 1/29/21   Historical Provider, MD   warfarin (COUMADIN) 2.5 MG tablet Take as directed by [facility] Current warfarin dose 5 mg Mon/Wed/Sat; 2.5 mg all other days of the week. 4/1/21   Historical Provider, MD   vitamin D (CHOLECALCIFEROL) 25 MCG (1000 UT) TABS tablet Take 1,000 Units by mouth daily    Historical Provider, MD   Omega-3 Fatty Acids (FISH OIL) 1200 MG CPDR Take by mouth    Historical Provider, MD   nitroGLYCERIN (NITROSTAT) 0.4 MG SL tablet Place 1 tablet under the tongue every 5 minutes as needed for Chest pain 7/5/16   Nilesh Townsend MD   citalopram (CELEXA) 40 MG tablet Take 40 mg by mouth daily.     Historical Provider, MD   aspirin 81 MG tablet Take 81 mg by mouth daily     Historical Provider, MD        CURRENT Medications:  perflutren lipid microspheres (DEFINITY) injection 1.65 mg, ONCE PRN  aspirin EC tablet 325 mg, Daily  carvedilol (COREG) tablet 6.25 mg, BID WC  citalopram (CELEXA) tablet 40 mg, Daily  digoxin (LANOXIN) tablet 125 mcg, Daily  ferrous sulfate (IRON 325) tablet 325 mg, Daily with breakfast  losartan (COZAAR) tablet 25 mg, Daily  pantoprazole (PROTONIX) tablet 40 mg, QAM AC  pravastatin (PRAVACHOL) tablet 40 mg, Nightly  vitamin D (CHOLECALCIFEROL) tablet 1,000 Units, Daily  sodium chloride flush 0.9 % injection 10 mL, 2 times per day  sodium chloride flush 0.9 % injection 10 mL, PRN  0.9 % sodium chloride infusion, PRN  potassium chloride (KLOR-CON M) extended release tablet 40 mEq, PRN   Or  potassium bicarb-citric acid (EFFER-K) effervescent tablet 40 mEq, PRN   Or  potassium chloride 10 mEq/100 mL IVPB (Peripheral Line), PRN  magnesium sulfate 2000 mg in 50 mL IVPB premix, PRN  senna (SENOKOT) tablet 8.6 mg, Daily PRN  acetaminophen (TYLENOL) tablet 650 mg, Q6H PRN   Or  acetaminophen (TYLENOL) suppository 650 mg, Q6H PRN  ondansetron (ZOFRAN-ODT) disintegrating tablet 4 mg, Q8H PRN   Or  ondansetron (ZOFRAN) injection 4 mg, Q6H PRN  cefepime (MAXIPIME) 2000 mg IVPB minibag, Q12H  metronidazole (FLAGYL) 500 mg in 0.9% NaCl 100 mL IVPB premix, Q8H  insulin lispro (HUMALOG) injection vial 0-12 Units, TID WC  insulin lispro (HUMALOG) injection vial 0-6 Units, Nightly  glucose chewable tablet 16 g, PRN  dextrose bolus 10% 125 mL, PRN   Or  dextrose bolus 10% 250 mL, PRN  glucagon (rDNA) injection 1 mg, PRN  dextrose 5 % solution, PRN  insulin glargine (LANTUS) injection vial 13 Units, Nightly  vancomycin (VANCOCIN) 750 mg in dextrose 5 % 250 mL IVPB, Q18H  warfarin placeholder: dosing by pharmacy, RX Placeholder        Allergies:  Diclofenac sodium, Kiwi extract, Adhesive tape, Lipitor, Lisinopril, Lorazepam, and Zocor [simvastatin]     Review of Systems:   A 14 point review of symptoms was completed. Pertinent positives were identified in the HPI. All other review of symptoms negative unless otherwise noted below. Objective   PHYSICAL EXAM:    Vitals:    07/11/22 0838   BP: 118/62   Pulse: 85   Resp: 16   Temp: 98F   SpO2: 92%    Weight: 194 lb 3.6 oz (88.1 kg)       General: Elderly male lying in bed in no acute distress. Wearing nasal cannula. HEENT: Normocephalic, atraumatic, non-icteric, hearing intact. Neck: Supple, trachea midline. No adenopathy. No thyromegaly. No JVD. Heart: Regular rate and rhythm. Normal S1 and S2. Grade II/VI holosystolic murmur. No rubs or gallops. Lungs: Normal respiratory effort. Breath sounds mildly diminished bilaterally. No wheezes, rales, or rhonchi. Abdomen: Soft, non-tender. Normoactive bowel sounds. No masses or organomegaly.   Skin: No rashes, wounds, or lesions. Pulses: 2+ and symmetric. Extremities: No clubbing, cyanosis, or edema. Musculoskeletal: Spontaneously moves all four extremities. Psych: Normal mood and affect. Neuro: Alert and oriented to person, place, and time. Labs   CBC:   Lab Results   Component Value Date/Time    WBC 9.5 07/10/2022 06:23 AM    RBC 3.90 07/10/2022 06:23 AM    HGB 11.9 07/10/2022 06:23 AM    HCT 36.7 07/10/2022 06:23 AM    MCV 94.0 07/10/2022 06:23 AM    RDW 15.4 07/10/2022 06:23 AM     07/10/2022 06:23 AM     CMP:  Lab Results   Component Value Date/Time     07/11/2022 06:24 AM    K 4.3 07/11/2022 06:24 AM    CL 97 07/11/2022 06:24 AM    CO2 29 07/11/2022 06:24 AM    BUN 38 07/11/2022 06:24 AM    CREATININE 1.7 07/11/2022 06:24 AM    GFRAA 47 07/11/2022 06:24 AM    AGRATIO 1.3 07/10/2022 06:23 AM    LABGLOM 39 07/11/2022 06:24 AM    GLUCOSE 102 07/11/2022 06:24 AM    PROT 6.7 07/10/2022 06:23 AM    CALCIUM 9.6 07/11/2022 06:24 AM    BILITOT 0.5 07/10/2022 06:23 AM    ALKPHOS 46 07/10/2022 06:23 AM    AST 27 07/10/2022 06:23 AM    ALT 7 07/10/2022 06:23 AM     PT/INR:  No results found for: PTINR  HgBA1c:  Lab Results   Component Value Date    LABA1C 6.2 07/09/2022     Lab Results   Component Value Date    TROPONINI 0.10 (H) 07/10/2022         Cardiac Data     Last EKG: AV-paced rhythm. Echo:  TTE 9/1/21:  Conclusions   Summary   The left ventricular systolic function is moderate to severely reduced with   an ejection fraction of 25-30 %. There is hypokinesis of the apex, apical lateral, apical anterior and mid   anteroseptum walls. Moderate concentric left ventricular hypertrophy. Grade II diastolic dysfunction with elevated filling pressure. Mild mitral and tricuspid regurgitation. The right ventricle is mildly enlarged. Right ventricular systolic function is moderately reduced .    Systolic pulmonic artery pressure (SPAP) is normal estimated at 35 mmHg   (Right atrial pressure of 8 mmHg).   The right atrium is mild dilated. TTE 3/22/22:  Study Conclusions   - Left ventricle: The cavity size is normal. Wall thickness was increased in a pattern of moderate     to severe LVH. Systolic function was severely reduced. The estimated ejection fraction was in the   Cong of 20% to 25%. Diffuse hypokinesis. Doppler parameters are consistent with a reversible     restrictive pattern, indicative of decreased left ventricular diastolic compliance and/or     increased left atrial pressure (grade 3 diastolic dysfunction). - Right ventricle: Systolic function was severely reduced. TAPSE: 1.1cm.   - Mitral valve: Moderate to severe regurgitation.   - Tricuspid valve: Moderate regurgitation.   - Left atrium: The atrium is mildly to moderately dilated. - Pulmonary arteries: Systolic pressure was within the normal range, estimated to be 42mm Hg     assuming that the right atrial pressure was 8 mmHg. - Inferior vena cava: The vessel was normal in size. The respirophasic diameter changes were blunted     (< 50%). - Pericardium, extracardiac: A trivial pericardial effusion is identified. Stress Test:    Cath:   [facility] 7/5/16:  Coronary angiography demonstrates a large left main that has mild ostial  disease, bifurcates into a very large circumflex. The circumflex has a large  1st obtuse marginal branch and then a very large main body segment and a  small AV groove. There is no critical disease really noted within the  circumflex and mild luminal irregularities. The LAD is visualized. The LAD is a fairly complex vessel with a very large  septal  that has 90% stenosis at its origin. The mid LAD has about  50% disease and then there is a large diagonal, the 1st diagonal, which  bifurcates into 2 branches. There is a superior branch that has 90% stenosis  that is small-to-modest in size and then the larger branch that has a  previously placed stent in its proximal segment.   That stent has mild  in-stent restenosis. The remaining LAD is free of any critical disease. The right coronary artery is visualized. The right coronary artery is a  large vessel, probably a dominant vessel, with large posterolateral branch  and minimal PDA. RV marginal branch provides the PDA territory. There is  disease within the right coronary artery that is about 50% in its proximal  segment and midsegment and then luminal irregularities throughout. Access verified above the bifurcation and Mynx device was used for arterial  hemostasis. CONCLUSIONS:  Modest coronary artery disease of the main arterial vessels  with significant branch vessel disease of the 1st septal  and the  1st branch of the 1st diagonal.  Neither of these are amenable to  percutaneous revascularization. Other Studies:   Chest X-ray 7/9/22:  1. Cardiomegaly without overt failure. CT abdomen/pelvis with contrast 7/9/22:      1. No ileus, obstruction or focal inflammatory bowel wall thickening. 2. Trace ascites seen just below the right hepatic lobe of uncertain   significance. 3. Cholecystectomy.  The appendix is not well seen, with provided history of   prior appendectomy.  Splenic me noted as well. 4. No urinary tract calculi or obstruction. 5. Atherosclerosis with coronary artery involvement. 6. Cardiomegaly with implanted cardiac device noted. Chest X-ray 7/11/22:      No radiographic evidence of acute pulmonary disease.       Stable cardiomegaly. Assessment and Plan      1. Shortness of breath - May have been secondary to deconditioning, mild hypervolemia, or possible acute respiratory infection given fever and rising procalcitonin level, although there is currently no radiographic evidence of pneumonia. There was concern by admitting team that patient was hypervolemic on exam and he was diuresed with IV lasix, but now appears to be near a clinically euvolemic state.   His last TTE from  in March noted moderate to severe mitral regurgitation and would recommend obtaining a repeat TTE to re-evaluate his cardiac function and severity of MR.  -Can hold off on additional IV lasix and would plan to transition to PO torsemide 20 mg daily tomorrow  -Antibiotic management and further infectious work-up per primary team  -TTE pending to re-evaluate cardiac function and mitral regurgitation  -He has a history of [place] Filter placement and has been on coumadin for a history of DVT/PE and paroxysmal atrial fibrillation/flutter - His INR was subtherapeutic on admission (now currently therapeutic). If symptoms do not improve, could consider V/Q scan to evaluate for recurrent PE (prefer to avoid giving contrast due to CKD)    2. Chronic troponin T elevation - In setting of CKD. Recent mild increase from previous baseline is likely attributable to demand ischemia in setting of acute hypoxic respiratory failure. Overall, cardiac enzyme trend has been relatively flat and there is no evidence of acute coronary syndrome. -OK to decrease aspirin to 81 mg daily  -Continue pravastatin 40 mg qHS    3. Chronic biventricular systolic heart failure/ischemic cardiomyopathy - LVEF 20-25% on last TTE from  on 3/22/22. RV systolic function noted to be severely reduced. S/p Biotronik BiV-ICD. -Recommend transitioning to PO torsemide 20 mg daily as above  -Continue coreg 6.25 mg BID, losartan 25 mg daily, and digoxin 0.125 mg daily  -Monitor strict I/Os, obtain daily standing weights  -Low sodium diet  -Maintain K>4 and Mg>2  -Device was last interrogated on 7/9/22. 4. CAD - S/p BMS to D1 in 4/2016 and then ELICIA to proximal RCA in 7/2017.  -OK reduce aspirin to 81 mg daily as above  -Continue beta-blocker and statin    5. Paroxysmal atrial fibrillation/flutter  -On coreg 6.25 mg BID and digoxin 0.125 mg daily  -Continue coumadin for goal INR 2-3    6.  Moderate-severe mitral regurgitation  -Can re-evaluate severity and assess potential etiology on repeat TTE    7. H/o DVT/PE  -Has [place] filter  -Continue coumadin for goal INR 2-3    8. Essential hypertension  -BP currently controlled  -Continue coreg 6.25 mg BID and losartan 25 mg daily    9. Hyperlipidemia  -Continue statin    10. CKD  -Creatinine currently near previous baseline    11. Type 2 DM  -Continue current insulin regimen      Thank you for allowing us to participate in the care of Ana Cee. Please call me with any questions [phone]. Navi Hatch DO  [facility]  [phone] [facility]  [phone] [address] [place] [address]  7/11/2022 9:01 AM      I will address the patient's cardiac risk factors and adjusted pharmacologic treatment as needed. In addition, I have reinforced the need for patient directed risk factor modification. All questions and concerns were addressed to the patient/family. Alternatives to my treatment were discussed. The note was completed using EMR. Every effort was made to ensure accuracy; however, inadvertent computerized transcription errors may be present.

## 2022-07-11 NOTE — CONSULTS
Consult placed    Paul:kenneth  Date:7/10/2022,  Time:8:57 PM        Electronically signed by Katherine Gtz on 7/10/2022 at 8:57 PM

## 2022-07-11 NOTE — PROGRESS NOTES
Occupational Therapy  Facility/Department: [facility] C3 TELE/MED SURG/ONC  Daily Treatment Note  NAME: Daysi Marroquin  : 1940  MRN: [id number]    Date of Service: 2022    Discharge Recommendations:  Subacute/Skilled Nursing Facility       AM-PAC score  AM-PAC Inpatient Daily Activity Raw Score: 13 (22 1543)  AM-PAC Inpatient ADL T-Scale Score : 32.03 (22 1543)  ADL Inpatient CMS 0-100% Score: 63.03 (22 1543)  ADL Inpatient CMS G-Code Modifier : CL (22)    Patient Diagnosis(es): The primary encounter diagnosis was Acute on chronic congestive heart failure, unspecified heart failure type ([address] Diagnoses of Acute respiratory failure with hypoxia ([address] and Community acquired pneumonia, unspecified laterality were also pertinent to this visit. Assessment    Assessment: Patient Bill for bed mobility, functional mobility/transfers with RW. Up to chair for BUE exercises, tolerated session well. Will continue to follow. Activity Tolerance: Patient tolerated treatment well;Patient limited by fatigue;Patient limited by endurance  Discharge Recommendations: [address]  Times per Week: 3-5x per week     Restrictions  Restrictions/Precautions  Restrictions/Precautions: Up as Tolerated; Fall Risk  Position Activity Restriction  Other position/activity restrictions: 2L NC, male mahesh,    Subjective   Subjective  Subjective: Pt motivated to work with OT today. \"I need to get up\"  Orientation  Overall Orientation Status: Within Normal Limits  Cognition  Overall Cognitive Status: WFL        Objective    Vitals   Bp 89/56 after mobility, 93/50 after ankle pumps and 1 min.sitting up in chair.  O2 96% on 2L, Hr 73 bpm    Bed Mobility Training  Supine to Sit: Minimum assistance  Sit to Supine: Other (comment) (up to chair at end of sesion)    Transfer Training  Transfer Training: Yes  Sit to Stand: Minimum assistance (up to RW)  Stand to Sit: Minimum assistance  Bed to Chair: Minimum assistance (with RW)  Gait  Overall Level of Assistance: Minimum assistance  Interventions: Safety awareness training;Verbal cues  Distance (ft): 40 Feet  Assistive Device: Walker, rolling        OT Exercises  Exercise Treatment: up in chair   BUE Exercise    AROM [x]        A/AROM[]     PROM[]    Reps: x10[] x 15[x]  x20 []   x30[]      Shoulder:      Flexion/ext [x]  ABD/ADDuction [x]  Shrugs [x]  Protraction/Retraction [x]    Elbow:   Flexion/ext [x]       Wrist:   Flexion/ext []  Pronation/Supination [x]    Digits:  Flexion/ext [x]  ABD/ADDuction []       BLE: ankle pumps, marches x 10 reps       Safety Devices  Type of Devices: Gait belt; All fall risk precautions in place; Patient at risk for falls; Chair alarm in place; Left in chair;Call light within reach;Nurse notified     Patient Education  Education Given To: Patient; Family  Education Provided: Role of Therapy;Transfer Training;Plan of Care;Energy Conservation; Fall Prevention Strategies  Education Provided Comments: importance of OOB activity, role of OT, safety during fx mobility,  Education Method: Demonstration;Verbal  Barriers to Learning: None  Education Outcome: Verbalized understanding;Demonstrated understanding;Continued education needed    Goals  Short Term Goals  Time Frame for Short term goals: 1 week unless otherwise specified by 7/17/22  Short Term Goal 1: Pt will complete toilet transfer with SBA and LRAD  Short Term Goal 2: Pt will complete LBD with SBA  Short Term Goal 3: Pt will improve activity tolerance to complete [id number] with vitals WFL by 7/14  Short Term Goal 4: Pt will complete 2-3 grooming tasks in standing with SBA       Therapy Time   Individual Concurrent Group Co-treatment   Time In 1400         Time Out 1440         Minutes 40         Timed Code Treatment Minutes: 40 Minutes       Gary Canales OTR/L  If pt is unable to be seen after this session, please let this note serve as discharge summary. Please see case management note for discharge disposition. Thank you.

## 2022-07-11 NOTE — FLOWSHEET NOTE
ACP conversation note written on 7/10 verified, pt does not wish to complete documents. Prayer as requested. Encouragement and values conversation. Bonita Cowart  Thank you for consulting Spiritual Care    If you need a  for emotional, spiritual or comfort care,   -or- assistance with [phone] [facility] or Living Will forms,  please dial \"0\" and ask for the "[employer] Song on-call to be paged.     You may also call us directly:  [phone] Camarillo  [phone]) Luke  [phone]) Outpatient

## 2022-07-12 VITALS
RESPIRATION RATE: 16 BRPM | BODY MASS INDEX: 24.7 KG/M2 | DIASTOLIC BLOOD PRESSURE: 70 MMHG | OXYGEN SATURATION: 94 % | HEIGHT: 74 IN | SYSTOLIC BLOOD PRESSURE: 109 MMHG | HEART RATE: 70 BPM | TEMPERATURE: 98 F | WEIGHT: 192.46 LBS

## 2022-07-12 LAB
ANION GAP SERPL CALCULATED.3IONS-SCNC: 11 MMOL/L (ref 3–16)
BASOPHILS ABSOLUTE: 0.1 K/UL (ref 0–0.2)
BASOPHILS RELATIVE PERCENT: 0.8 %
BUN BLDV-MCNC: 40 MG/DL (ref 7–20)
CALCIUM SERPL-MCNC: 9.4 MG/DL (ref 8.3–10.6)
CHLORIDE BLD-SCNC: 96 MMOL/L (ref 99–110)
CO2: 28 MMOL/L (ref 21–32)
CREAT SERPL-MCNC: 1.7 MG/DL (ref 0.8–1.3)
EKG ATRIAL RATE: 220 BPM
EKG ATRIAL RATE: 357 BPM
EKG DIAGNOSIS: NORMAL
EKG DIAGNOSIS: NORMAL
EKG P-R INTERVAL: 150 MS
EKG Q-T INTERVAL: 464 MS
EKG Q-T INTERVAL: 476 MS
EKG QRS DURATION: 174 MS
EKG QRS DURATION: 176 MS
EKG QTC CALCULATION (BAZETT): 501 MS
EKG QTC CALCULATION (BAZETT): 528 MS
EKG R AXIS: 240 DEGREES
EKG R AXIS: 250 DEGREES
EKG T AXIS: 52 DEGREES
EKG T AXIS: 73 DEGREES
EKG VENTRICULAR RATE: 70 BPM
EKG VENTRICULAR RATE: 74 BPM
EOSINOPHILS ABSOLUTE: 0.2 K/UL (ref 0–0.6)
EOSINOPHILS RELATIVE PERCENT: 2.5 %
GFR AFRICAN AMERICAN: 47
GFR NON-AFRICAN AMERICAN: 39
GLUCOSE BLD-MCNC: 105 MG/DL (ref 70–99)
GLUCOSE BLD-MCNC: 142 MG/DL (ref 70–99)
GLUCOSE BLD-MCNC: 193 MG/DL (ref 70–99)
GLUCOSE BLD-MCNC: 91 MG/DL (ref 70–99)
GLUCOSE BLD-MCNC: 94 MG/DL (ref 70–99)
HCT VFR BLD CALC: 37.4 % (ref 40.5–52.5)
HEMOGLOBIN: 12.1 G/DL (ref 13.5–17.5)
INR BLD: 2.16 (ref 0.87–1.14)
LV EF: 28 %
LVEF MODALITY: NORMAL
LYMPHOCYTES ABSOLUTE: 1 K/UL (ref 1–5.1)
LYMPHOCYTES RELATIVE PERCENT: 14.7 %
MCH RBC QN AUTO: 30.3 PG (ref 26–34)
MCHC RBC AUTO-ENTMCNC: 32.3 G/DL (ref 31–36)
MCV RBC AUTO: 93.9 FL (ref 80–100)
MONOCYTES ABSOLUTE: 0.4 K/UL (ref 0–1.3)
MONOCYTES RELATIVE PERCENT: 6.2 %
NEUTROPHILS ABSOLUTE: 5.1 K/UL (ref 1.7–7.7)
NEUTROPHILS RELATIVE PERCENT: 75.8 %
PDW BLD-RTO: 15.6 % (ref 12.4–15.4)
PERFORMED ON: ABNORMAL
PERFORMED ON: NORMAL
PLATELET # BLD: 179 K/UL (ref 135–450)
PMV BLD AUTO: 9.4 FL (ref 5–10.5)
POTASSIUM REFLEX MAGNESIUM: 3.6 MMOL/L (ref 3.5–5.1)
PROTHROMBIN TIME: 23.9 SEC (ref 11.7–14.5)
RBC # BLD: 3.98 M/UL (ref 4.2–5.9)
SODIUM BLD-SCNC: 135 MMOL/L (ref 136–145)
WBC # BLD: 6.7 K/UL (ref 4–11)

## 2022-07-12 PROCEDURE — 6370000000 HC RX 637 (ALT 250 FOR IP): Performed by: INTERNAL MEDICINE

## 2022-07-12 PROCEDURE — 36415 COLL VENOUS BLD VENIPUNCTURE: CPT

## 2022-07-12 PROCEDURE — 6370000000 HC RX 637 (ALT 250 FOR IP): Performed by: HOSPITALIST

## 2022-07-12 PROCEDURE — 97530 THERAPEUTIC ACTIVITIES: CPT

## 2022-07-12 PROCEDURE — 29125 APPL SHORT ARM SPLINT STATIC: CPT

## 2022-07-12 PROCEDURE — 80048 BASIC METABOLIC PNL TOTAL CA: CPT

## 2022-07-12 PROCEDURE — 97110 THERAPEUTIC EXERCISES: CPT

## 2022-07-12 PROCEDURE — 85025 COMPLETE CBC W/AUTO DIFF WBC: CPT

## 2022-07-12 PROCEDURE — 85610 PROTHROMBIN TIME: CPT

## 2022-07-12 PROCEDURE — 93010 ELECTROCARDIOGRAM REPORT: CPT | Performed by: INTERNAL MEDICINE

## 2022-07-12 PROCEDURE — 93306 TTE W/DOPPLER COMPLETE: CPT

## 2022-07-12 PROCEDURE — 94761 N-INVAS EAR/PLS OXIMETRY MLT: CPT

## 2022-07-12 PROCEDURE — 2700000000 HC OXYGEN THERAPY PER DAY

## 2022-07-12 PROCEDURE — 6370000000 HC RX 637 (ALT 250 FOR IP): Performed by: PHYSICIAN ASSISTANT

## 2022-07-12 PROCEDURE — 2580000003 HC RX 258: Performed by: HOSPITALIST

## 2022-07-12 PROCEDURE — 97116 GAIT TRAINING THERAPY: CPT

## 2022-07-12 RX ORDER — AMOXICILLIN AND CLAVULANATE POTASSIUM 500; 125 MG/1; MG/1
1 TABLET, FILM COATED ORAL EVERY 12 HOURS SCHEDULED
Qty: 8 TABLET | Refills: 0 | Status: SHIPPED | OUTPATIENT
Start: 2022-07-12 | End: 2022-07-16

## 2022-07-12 RX ORDER — WARFARIN SODIUM 2.5 MG/1
2.5 TABLET ORAL
Status: COMPLETED | OUTPATIENT
Start: 2022-07-12 | End: 2022-07-12

## 2022-07-12 RX ADMIN — CITALOPRAM HYDROBROMIDE 40 MG: 20 TABLET ORAL at 08:08

## 2022-07-12 RX ADMIN — ASPIRIN 81 MG: 81 TABLET, COATED ORAL at 08:08

## 2022-07-12 RX ADMIN — LOSARTAN POTASSIUM 25 MG: 25 TABLET, FILM COATED ORAL at 08:07

## 2022-07-12 RX ADMIN — CARVEDILOL 6.25 MG: 6.25 TABLET, FILM COATED ORAL at 18:10

## 2022-07-12 RX ADMIN — FERROUS SULFATE TAB 325 MG (65 MG ELEMENTAL FE) 325 MG: 325 (65 FE) TAB at 08:08

## 2022-07-12 RX ADMIN — ACETAMINOPHEN 650 MG: 325 TABLET ORAL at 08:07

## 2022-07-12 RX ADMIN — INSULIN LISPRO 2 UNITS: 100 INJECTION, SOLUTION INTRAVENOUS; SUBCUTANEOUS at 12:32

## 2022-07-12 RX ADMIN — Medication 1000 UNITS: at 08:07

## 2022-07-12 RX ADMIN — TORSEMIDE 20 MG: 20 TABLET ORAL at 08:07

## 2022-07-12 RX ADMIN — CARVEDILOL 6.25 MG: 6.25 TABLET, FILM COATED ORAL at 08:08

## 2022-07-12 RX ADMIN — DIGOXIN 125 MCG: 125 TABLET ORAL at 08:08

## 2022-07-12 RX ADMIN — AMOXICILLIN AND CLAVULANATE POTASSIUM 1 TABLET: 500; 125 TABLET, FILM COATED ORAL at 08:07

## 2022-07-12 RX ADMIN — PANTOPRAZOLE SODIUM 40 MG: 40 TABLET, DELAYED RELEASE ORAL at 06:02

## 2022-07-12 RX ADMIN — SODIUM CHLORIDE, PRESERVATIVE FREE 10 ML: 5 INJECTION INTRAVENOUS at 08:09

## 2022-07-12 RX ADMIN — WARFARIN SODIUM 2.5 MG: 2.5 TABLET ORAL at 18:10

## 2022-07-12 NOTE — PROGRESS NOTES
Physical Therapy  Facility/Department: [facility] C3 TELE/MED SURG/ONC  Daily Treatment Note  NAME: Daysi Marroquin  : 1940  MRN: [id number]    Date of Service: 2022    Discharge Recommendations:  Subacute/Skilled Nursing Facility   PT Equipment Recommendations  Equipment Needed: No  Other: Defer to next level of care  [facility] 6 Clicks Inpatient Mobility:  AM-PAC Mobility Inpatient   How much difficulty turning over in bed?: A Little  How much difficulty sitting down on / standing up from a chair with arms?: A Little  How much difficulty moving from lying on back to sitting on side of bed?: A Little  How much help from another person moving to and from a bed to a chair?: A Little  How much help from another person needed to walk in hospital room?: A Little  How much help from another person for climbing 3-5 steps with a railing?: Total  AM-PAC Inpatient Mobility Raw Score : 16  AM-PAC Inpatient T-Scale Score : 40.78  Mobility Inpatient CMS 0-100% Score: 54.16  Mobility Inpatient CMS G-Code Modifier : CK    Patient Diagnosis(es): The primary encounter diagnosis was Acute on chronic congestive heart failure, unspecified heart failure type ([facility] [address] [facility] Diagnoses of Acute respiratory failure with hypoxia ([facility] [address] [facility] and Community acquired pneumonia, unspecified laterality were also pertinent to this visit. Assessment   Assessment: Patient Bill for all mobility, gait//transfers with RW. Up to chair for BLE exercises, tolerated session well. Will continue to follow.   Activity Tolerance: Patient tolerated treatment well;Patient limited by endurance  Equipment Needed: No  Other: Defer to next level of care     Plan    Plan  Plan: 3-5 times per week  Specific Instructions for Next Treatment: Progress mobility as tolerated  Current Treatment Recommendations: Strengthening;Equipment evaluation, education, & procurement;Balance training;Gait training;Functional mobility training;Home exercise program;Neuromuscular re-education;Transfer training; Safety education & training; Therapeutic activities; Patient/Caregiver education & training; Endurance training     Restrictions  Restrictions/Precautions  Restrictions/Precautions: Up as Tolerated,Fall Risk  Position Activity Restriction  Other position/activity restrictions: 2L NC, male mahesh,     Subjective    Subjective  Subjective: Pt agrees to PT session. Pain: 0/10  Orientation  Overall Orientation Status: Within Normal Limits     Objective   Vitals  Comment: room air 95%  Bed Mobility Training  Bed Mobility Training: Yes  Supine to Sit: Minimum assistance  Balance  Sitting: Impaired  Sitting - Static: Good (unsupported)  Sitting - Dynamic: Fair (occasional)  Standing: Impaired  Standing - Static: Constant support  Standing - Dynamic: Constant support  Transfer Training  Transfer Training: Yes  Sit to Stand: Minimum assistance  Stand to Sit: Minimum assistance  Gait Training  Gait Training: Yes  Gait  Overall Level of Assistance: Minimum assistance  Interventions: Safety awareness training;Verbal cues  Base of Support: Narrowed  Speed/Juliana: Delayed  Stance: Right decreased; Left decreased  Distance (ft):  (25 ft)  Assistive Device: Walker, rolling     PT Exercises  Exercise Treatment: performed BLE TE 15X EACH: AP, GS, HIP ABD, LAQ, MARCHES     Safety Devices  Type of Devices: Gait belt; All fall risk precautions in place; Patient at risk for falls; Chair alarm in place; Left in chair;Call light within reach;Nurse notified       Goals  Short Term Goals  Time Frame for Short term goals: 1 week 7/17/22 (unless otherwise specified)  Short term goal 1: Pt will complete supine to/from sit with S  -7/12 min A  Short term goal 2: Pt will complete sit to/from stand with RW with S   -7/12 min A  Short term goal 3: Pt will ambulate 48' with RW with S without LOB   -7/12 min A rw 90'  Short term goal 4: 7/14/22: Pt will participate in 12-15 reps BLE exercises to increase strength and

## 2022-07-12 NOTE — PROGRESS NOTES
TTE shows LVEF of 25-30% which is similar to previous. Mild regurgitation only appeared to be mild in severity. No further inpatient cardiac evaluation is currently indicated. Can continue aspirin 81 mg daily, pravastatin 40 mg qHS, coreg 6.25 mg BID, losartan 25 mg daily, digoxin 0.125 mg daily, and torsemide 20 mg daily. Recommend follow-up with Dr. Luis Angel Osullivan at [facility] in 2-4 weeks.       Nicho Smith, [address] [facility]

## 2022-07-12 NOTE — PLAN OF CARE
Problem: Skin/Tissue Integrity  Goal: Absence of new skin breakdown  Description: 1. Monitor for areas of redness and/or skin breakdown  2. Assess vascular access sites hourly  3. Every 4-6 hours minimum:  Change oxygen saturation probe site  4. Every 4-6 hours:  If on nasal continuous positive airway pressure, respiratory therapy assess nares and determine need for appliance change or resting period.   Outcome: Adequate for Discharge     Problem: Safety - Adult  Goal: Free from fall injury  Outcome: Adequate for Discharge     Problem: ABCDS Injury Assessment  Goal: Absence of physical injury  Outcome: Adequate for Discharge     Problem: Pain  Goal: Verbalizes/displays adequate comfort level or baseline comfort level  Outcome: Adequate for Discharge     Problem: Chronic Conditions and Co-morbidities  Goal: Patient's chronic conditions and co-morbidity symptoms are monitored and maintained or improved  Outcome: Adequate for Discharge

## 2022-07-12 NOTE — PROGRESS NOTES
Attempted to call report for the 2nd time to [facility] [place]. Spoke with  who took my number for the receiving nurse to call me at their convenience.

## 2022-07-12 NOTE — PROGRESS NOTES
Pharmacy Note  Warfarin Consult  Dx: afib  Goal INR range 2-3   Home Warfarin dose: 2.5 mg Sat; 5 mg all other days of the week      Date                 INR                  Warfarin  7/9                   1.76                    5 mg   7/10                 1.65                    5 mg   7/11                 2.16                    0 mg  7/12                 2.16                    2.5mg      Recommend  warfarin 2.5mg X1 tonight  . Daily INR ordered. Rx will continue to manage therapy per consult order.   Shanna Hoffmann/Marlee. 7/12/22 5:50 PM EDT

## 2022-07-12 NOTE — DISCHARGE SUMMARY
Hospital Medicine Discharge Summary    Patient ID: Devin Leung      Patient's PCP: Leonard Foster MD    Admit Date: 7/9/2022     Discharge Date:   7/12/22    Admitting Provider: Emily Wilde MD     Discharge Provider: Yumi Toth MD     Discharge Diagnoses: Active Hospital Problems    Diagnosis     Acute febrile illness [R50.9]      Priority: Medium    HTN (hypertension) [I10]     PAF (paroxysmal atrial fibrillation) (HCC) [I48.0]     DM2 (diabetes mellitus, type 2) (HCC) [W80.7]     Systolic CHF ([address] [H95.40]        The patient was seen and examined on day of discharge and this discharge summary is in conjunction with any daily progress note from day of discharge. Hospital Course:       [de-identified] y. o. male who presented to the ED to be evaluated for a multitude of complaints ongoing for 2 days PTA. [facility] reports that he has had extreme fatigue, nausea and vomiting, and generalized myalgias of unknown cause.  Overnight patient developed a fever documented as high as 101.5, treated with Tylenol this AM.  He also endorses increased dyspnea and intermittent cough.  Patient is fully vaccinated against COVID-19.  He is a non-smoker.     Upon arrival to the ED CXR was obtained revealing cardiomegaly without overt failure.  CT scan of the abdomen pelvis noted to be negative for ileus, obstruction, or inflammatory bowel wall thickening; trace ascites below right hepatic lobe appreciated.  Influenza A/B and SARS COVID testing performed, with all results noted to be negative.  Notable labs include: OLIVIER with BUNs/CR 36/1.  6, hyperglycemia 160, BNP 14,104, troponin 0.06, and procalcitonin 0. 18.  Patient received a dosage of Rocephin and Zithromax per ED provider, without collection of blood cultures prior to administration.    -Acute febrile illness  Patient had fever of 101.5 upon admission. Chest x-ray showed no pneumonia. Procalcitonin is 1. .   Patient treated with empiric antibiotic vancomycin cefepime and Flagyl. Chest x-ray showed no infiltrates. UA negative. No more fever episodes during hospital admission. Patient will be transitioned to Augmentin. - Acute on chronic systolic heart failure  Echocardiogram showed ejection fraction of 25%. Patient treated with IV diuretics furosemide. Patient has a grade 3 diastolic dysfunction. - Acute on chronic diastolic heart failure  Patient treated with diuretics. Daily weight, input output monitoring.      -Debility  Patient needs inpatient physical therapy program.  Patient is being discharged to skilled nursing facility. Physical Exam Performed:     BP 99/64   Pulse 71   Temp 97.5 °F (36.4 °C) (Oral)   Resp 16   Ht 6' 2\" (1.88 m)   Wt 201 lb 15.1 oz (91.6 kg)   SpO2 94%   BMI 25.93 kg/m²       General appearance:  No apparent distress, appears stated age and cooperative. HEENT:  Normal cephalic, atraumatic without obvious deformity. Pupils equal, round, and reactive to light. Extra ocular muscles intact. Conjunctivae/corneas clear. Neck: Supple, with full range of motion. No jugular venous distention. Trachea midline. Respiratory:  Normal respiratory effort. Clear to auscultation, bilaterally without Rales/Wheezes/Rhonchi. Cardiovascular:  Regular rate and rhythm with normal S1/S2 without murmurs, rubs or gallops. Abdomen: Soft, non-tender, non-distended with normal bowel sounds. Musculoskeletal:  No clubbing, cyanosis or edema bilaterally. Full range of motion without deformity. Skin: Skin color, texture, turgor normal.  No rashes or lesions. Neurologic:  Neurovascularly intact without any focal sensory/motor deficits. Cranial nerves: II-XII intact, grossly non-focal.  Psychiatric:  Alert and oriented, thought content appropriate, normal insight  Capillary Refill: Brisk,< 3 seconds   Peripheral Pulses: +2 palpable, equal bilaterally       Labs:  For convenience and continuity at follow-up the following most recent labs are provided:      CBC:    Lab Results   Component Value Date/Time    WBC 6.7 07/12/2022 05:39 AM    HGB 12.1 07/12/2022 05:39 AM    HCT 37.4 07/12/2022 05:39 AM     07/12/2022 05:39 AM       Renal:    Lab Results   Component Value Date/Time     07/12/2022 05:39 AM    K 3.6 07/12/2022 05:39 AM    CL 96 07/12/2022 05:39 AM    CO2 28 07/12/2022 05:39 AM    BUN 40 07/12/2022 05:39 AM    CREATININE 1.7 07/12/2022 05:39 AM    CALCIUM 9.4 07/12/2022 05:39 AM         Significant Diagnostic Studies    Radiology:   XR CHEST (2 VW)   Final Result   No radiographic evidence of acute pulmonary disease. Stable cardiomegaly. CT ABDOMEN PELVIS W IV CONTRAST Additional Contrast? None   Final Result   1. No ileus, obstruction or focal inflammatory bowel wall thickening. 2. Trace ascites seen just below the right hepatic lobe of uncertain   significance. 3. Cholecystectomy. The appendix is not well seen, with provided history of   prior appendectomy. Splenic me noted as well. 4. No urinary tract calculi or obstruction. 5. Atherosclerosis with coronary artery involvement. 6. Cardiomegaly with implanted cardiac device noted. XR CHEST PORTABLE   Final Result   1. Cardiomegaly without overt failure.                 Consults:     PHARMACY TO DOSE MEDICATION  PHARMACY TO DOSE MEDICATION  IP CONSULT TO CARDIOLOGY    Disposition:  SNF      Condition at Discharge: Stable    Discharge Instructions/Follow-up:  snf    Code Status:  Limited     Activity: activity as tolerated    Diet: cardiac diet      Discharge Medications:     Current Discharge Medication List           Details   torsemide (DEMADEX) 20 MG tablet Take 1 tablet by mouth daily  Qty: 30 tablet, Refills: 3      carvedilol (COREG) 6.25 MG tablet Take 1 tablet by mouth 2 times daily (with meals)  Qty: 60 tablet, Refills: 3      ferrous sulfate (FE TABS 325) 325 (65 Fe) MG EC tablet Take 1 tablet by mouth daily (with breakfast)  Qty: 30 tablet, Refills: 2      digoxin (LANOXIN) 125 MCG tablet TAKE 1 TABLET BY MOUTH EVERY DAY      metFORMIN (GLUCOPHAGE) 1000 MG tablet       losartan (COZAAR) 25 MG tablet TAKE 1 TABLET BY MOUTH  DAILY FOR CHRONIC HEART  FAILURE, HYPERTENSION      omeprazole (PRILOSEC) 20 MG delayed release capsule TAKE 1 CAPSULE BY MOUTH  DAILY FOR GASTROESOPHAGEAL  REFLUX DISEASE      pravastatin (PRAVACHOL) 40 MG tablet TAKE 1 TABLET BY MOUTH  DAILY      warfarin (COUMADIN) 2.5 MG tablet Take as directed by [facility]. Current warfarin dose 5 mg Mon/Wed/Sat; 2.5 mg all other days of the week. vitamin D (CHOLECALCIFEROL) 25 MCG (1000 UT) TABS tablet Take 1,000 Units by mouth daily      Omega-3 Fatty Acids (FISH OIL) 1200 MG CPDR Take by mouth      nitroGLYCERIN (NITROSTAT) 0.4 MG SL tablet Place 1 tablet under the tongue every 5 minutes as needed for Chest pain  Qty: 25 tablet, Refills: 1      citalopram (CELEXA) 40 MG tablet Take 40 mg by mouth daily. aspirin 81 MG tablet Take 81 mg by mouth daily              Time Spent on discharge is more than 30 minutes in the examination, evaluation, counseling and review of medications and discharge plan. Signed: My Davies MD   7/12/2022      Thank you Shahla Layne MD for the opportunity to be involved in this patient's care. If you have any questions or concerns, please feel free to contact me at [phone].

## 2022-07-12 NOTE — PROGRESS NOTES
Attempted to call report to [facility] [place]. Nurse accepting patient did not answer. Will attempt report again before patient leaves this facility.

## 2022-07-13 LAB
BLOOD CULTURE, ROUTINE: NORMAL
CULTURE, BLOOD 2: NORMAL

## 2022-07-14 NOTE — PROGRESS NOTES
Physician Progress Note      PATIENT:               Nova Senate  CSN #:                  [id number]  :                       1940  ADMIT DATE:       2022 10:41 AM  DISCH DATE:        2022 8:30 PM  RESPONDING  PROVIDER #:        Luly Villafana          QUERY TEXT:    Patient admitted \" Acute febrile illness- Exam concerning for possible   aspiration pneumonia following recent episodes of nausea and   vomiting/Decompensated CHF\" per H&P. Discharge summary notes-  \" -Acute   febrile illness Patient had fever of 101.5 upon admission. Chest x-ray showed   no pneumonia. Procalcitonin is 1. \". If possible, please document in   the progress notes and discharge summary if  aspiration pneumonia was: The medical record reflects the following:  Risk Factors: fever, elevated Procalcitonin, N/V, productive cough  Clinical Indicators:  Temp- 101.5   OA, - 100.2, Procalcitonin 1.08 on    - H&P-  \"He also endorses increased dyspnea and intermittent cough-   Respiratory: Positive acute on chronic dyspnea with productive cough  Patient   received a dosage of Rocephin and Zithromax per ED provider, without   collection of blood cultures prior to administration\"  Treatment: Blood, urine cultures ordered STAT by admitting hospitalist   provider  -Patient initiated on IV vancomycin, cefepime, and Flagyl empirically pending   pathogen identification; pharmacy consulted for dosage assistance-Serial CBC,   CMP, lactate, procalcitonin to monitor treatment progression,  Patient will be   transitioned to Augmentin. Thank-You, Sweta Agudelo RN, BSN, CCDS  Options provided:  -- Possible aspiration pneumonia, treated POA  -- Other - I will add my own diagnosis  -- Disagree - Not applicable / Not valid  -- Disagree - Clinically unable to determine / Unknown  -- Refer to Clinical Documentation Reviewer    PROVIDER RESPONSE TEXT:    This patient has Possible aspiration pneumonia, treated POA.     Query created by: Tiana Talamantes on 7/14/2022 10:22 AM      Electronically signed by:   Ruiz Valles 7/14/2022 4:06 PM

## 2023-08-19 ENCOUNTER — APPOINTMENT (OUTPATIENT)
Dept: CT IMAGING | Age: 83
End: 2023-08-19
Payer: MEDICARE

## 2023-08-19 ENCOUNTER — HOSPITAL ENCOUNTER (EMERGENCY)
Age: 83
Discharge: HOME | End: 2023-08-19
Payer: MEDICARE

## 2023-08-19 VITALS
DIASTOLIC BLOOD PRESSURE: 69 MMHG | RESPIRATION RATE: 16 BRPM | HEART RATE: 76 BPM | OXYGEN SATURATION: 98 % | WEIGHT: 191 LBS | BODY MASS INDEX: 24.52 KG/M2 | SYSTOLIC BLOOD PRESSURE: 110 MMHG | TEMPERATURE: 97.8 F

## 2023-08-19 DIAGNOSIS — S09.90XA CLOSED HEAD INJURY, INITIAL ENCOUNTER: Primary | ICD-10-CM

## 2023-08-19 DIAGNOSIS — N18.9 CHRONIC KIDNEY DISEASE, UNSPECIFIED CKD STAGE: ICD-10-CM

## 2023-08-19 DIAGNOSIS — S00.03XA CONTUSION OF SCALP, INITIAL ENCOUNTER: ICD-10-CM

## 2023-08-19 DIAGNOSIS — Z79.01 ANTICOAGULATED ON COUMADIN: ICD-10-CM

## 2023-08-19 LAB
ALBUMIN SERPL-MCNC: 3.5 G/DL (ref 3.4–5)
ALBUMIN/GLOB SERPL: 1 {RATIO} (ref 1.1–2.2)
ALP SERPL-CCNC: 71 U/L (ref 40–129)
ALT SERPL-CCNC: 7 U/L (ref 10–40)
ANION GAP SERPL CALCULATED.3IONS-SCNC: 14 MMOL/L (ref 3–16)
AST SERPL-CCNC: 23 U/L (ref 15–37)
BASOPHILS # BLD: 0 K/UL (ref 0–0.2)
BASOPHILS NFR BLD: 0 %
BILIRUB SERPL-MCNC: 0.7 MG/DL (ref 0–1)
BUN SERPL-MCNC: 65 MG/DL (ref 7–20)
CALCIUM SERPL-MCNC: 9.5 MG/DL (ref 8.3–10.6)
CHLORIDE SERPL-SCNC: 100 MMOL/L (ref 99–110)
CO2 SERPL-SCNC: 25 MMOL/L (ref 21–32)
CREAT SERPL-MCNC: 1.8 MG/DL (ref 0.8–1.3)
DEPRECATED RDW RBC AUTO: 17 % (ref 12.4–15.4)
EOSINOPHIL # BLD: 0.1 K/UL (ref 0–0.6)
EOSINOPHIL NFR BLD: 1.4 %
GFR SERPLBLD CREATININE-BSD FMLA CKD-EPI: 37 ML/MIN/{1.73_M2}
GLUCOSE SERPL-MCNC: 87 MG/DL (ref 70–99)
HCT VFR BLD AUTO: 39.8 % (ref 40.5–52.5)
HGB BLD-MCNC: 12.7 G/DL (ref 13.5–17.5)
INR PPP: 2.08 (ref 0.84–1.16)
LYMPHOCYTES # BLD: 1.1 K/UL (ref 1–5.1)
LYMPHOCYTES NFR BLD: 16 %
MCH RBC QN AUTO: 30 PG (ref 26–34)
MCHC RBC AUTO-ENTMCNC: 31.9 G/DL (ref 31–36)
MCV RBC AUTO: 94 FL (ref 80–100)
MONOCYTES # BLD: 0.3 K/UL (ref 0–1.3)
MONOCYTES NFR BLD: 5.1 %
NEUTROPHILS # BLD: 5.3 K/UL (ref 1.7–7.7)
NEUTROPHILS NFR BLD: 77.5 %
PLATELET # BLD AUTO: 295 K/UL (ref 135–450)
PMV BLD AUTO: 9.2 FL (ref 5–10.5)
POTASSIUM SERPL-SCNC: 4.1 MMOL/L (ref 3.5–5.1)
PROT SERPL-MCNC: 7 G/DL (ref 6.4–8.2)
PROTHROMBIN TIME: 23.3 SEC (ref 11.5–14.8)
RBC # BLD AUTO: 4.23 M/UL (ref 4.2–5.9)
SODIUM SERPL-SCNC: 139 MMOL/L (ref 136–145)
WBC # BLD AUTO: 6.8 K/UL (ref 4–11)

## 2023-08-19 PROCEDURE — 70450 CT HEAD/BRAIN W/O DYE: CPT

## 2023-08-19 PROCEDURE — 99284 EMERGENCY DEPT VISIT MOD MDM: CPT

## 2023-08-19 PROCEDURE — 2580000003 HC RX 258: Performed by: PHYSICIAN ASSISTANT

## 2023-08-19 PROCEDURE — 85610 PROTHROMBIN TIME: CPT

## 2023-08-19 PROCEDURE — 85025 COMPLETE CBC W/AUTO DIFF WBC: CPT

## 2023-08-19 PROCEDURE — 80053 COMPREHEN METABOLIC PANEL: CPT

## 2023-08-19 RX ORDER — 0.9 % SODIUM CHLORIDE 0.9 %
500 INTRAVENOUS SOLUTION INTRAVENOUS ONCE
Status: COMPLETED | OUTPATIENT
Start: 2023-08-19 | End: 2023-08-19

## 2023-08-19 RX ADMIN — SODIUM CHLORIDE 500 ML: 9 INJECTION, SOLUTION INTRAVENOUS at 12:10

## 2023-08-19 ASSESSMENT — PAIN SCALES - GENERAL: PAINLEVEL_OUTOF10: 0

## 2023-08-19 ASSESSMENT — PAIN - FUNCTIONAL ASSESSMENT: PAIN_FUNCTIONAL_ASSESSMENT: 0-10

## 2023-08-19 NOTE — ED PROVIDER NOTES
[address] [place] [address]  ED  EMERGENCY DEPARTMENT ENCOUNTER        Pt Name: Jessica Vences  MRN: [id number]  [facility] 1940  Date of evaluation: 8/19/2023  Provider: Dayana Khan PA-C  PCP: Jama Townsend MD  ED Attending: Marcia LOPEZ. I have evaluated this patient. CHIEF COMPLAINT:     Chief Complaint   Patient presents with    Fall     Coming from [employer]. Patient dropped remote, attempted to lead out of bed to grab it and he fell. States he hit butt first then hit left side of forehead when attempting to get back into bed. No LOC. On Coumadin. HISTORY OF PRESENT ILLNESS:      History provided by the patient. No limitations. Jessica Vences is a 82 y.o. male who arrives to the ED by EMS from [facility] where he is a resident. Patient suffered a fall reportedly out of his bed today. The patient states he was sitting in bed watching TV and dropped the remote. He attempted to lean over his bed to get it and fell out of his bed. He reports landing on that side but in the midst of following, did hit his head. He has a contusion to the left parietal scalp. He is on Coumadin and was sent in for evaluation. The patient denies any other injuries or areas of pain from falling. He is a full code. Nursing Notes were reviewed     REVIEW OF SYSTEMS:     Review of Systems  Positives and pertinent negatives as per HPI.       PAST MEDICAL HISTORY:     Past Medical History:   Diagnosis Date    Acute MI ([address]) 2016    Cancer ([address]) 2002    non-hodgkins lymphoma    CHF (congestive heart failure) (HCC)     Diabetes mellitus (HCC)     GERD (gastroesophageal reflux disease)     Hyperlipidemia     Hypertension     Neuropathy        SURGICAL HISTORY:      Past Surgical History:   Procedure Laterality Date    APPENDECTOMY      CATARACT REMOVAL WITH IMPLANT Left 08/08/2019    CATARACT REMOVAL WITH IMPLANT Right 09/05/2019    PHACO EMULSIFICATION OF CATARACT WITH INTRAOCULAR LENS ABDOMEN: Soft. Nondistended. No tenderness to palpate. /ANORECTAL: Not assessed  MUSKULOSKELETAL: Normal ROM including good range of motion of bilateral shoulders and elbows, hips and knees. No acute deformities. No edema. No bony tenderness to palpate down the C, T or L-spine. [place] SKIN: Warm and dry. No rash. NEUROLOGICAL: Alert and oriented x 3. GCS 15. CN II-XII grossly intact. Strength is 5/5 in all extremities and sensation is intact.    PSYCHIATRIC: Normal affect      DIAGNOSTIC RESULTS:     LABS:    Results for orders placed or performed during the hospital encounter of 08/19/23   CBC with Auto Differential   Result Value Ref Range    WBC 6.8 4.0 - 11.0 K/uL    RBC 4.23 4.20 - 5.90 M/uL    Hemoglobin 12.7 (L) 13.5 - 17.5 g/dL    Hematocrit 39.8 (L) 40.5 - 52.5 %    MCV 94.0 80.0 - 100.0 fL    MCH 30.0 26.0 - 34.0 pg    MCHC 31.9 31.0 - 36.0 g/dL    RDW 17.0 (H) 12.4 - 15.4 %    Platelets 220 886 - 021 K/uL    MPV 9.2 5.0 - 10.5 fL    Neutrophils % 77.5 %    Lymphocytes % 16.0 %    Monocytes % 5.1 %    Eosinophils % 1.4 %    Basophils % 0.0 %    Neutrophils Absolute 5.3 1.7 - 7.7 K/uL    Lymphocytes Absolute 1.1 1.0 - 5.1 K/uL    Monocytes Absolute 0.3 0.0 - 1.3 K/uL    Eosinophils Absolute 0.1 0.0 - 0.6 K/uL    Basophils Absolute 0.0 0.0 - 0.2 K/uL   CMP w/ Reflex to MG   Result Value Ref Range    Sodium 139 136 - 145 mmol/L    Potassium reflex Magnesium 4.1 3.5 - 5.1 mmol/L    Chloride 100 99 - 110 mmol/L    CO2 25 21 - 32 mmol/L    Anion Gap 14 3 - 16    Glucose 87 70 - 99 mg/dL    BUN 65 (H) 7 - 20 mg/dL    Creatinine 1.8 (H) 0.8 - 1.3 mg/dL    Est, Glom Filt Rate 37 (A) >60    Calcium 9.5 8.3 - 10.6 mg/dL    Total Protein 7.0 6.4 - 8.2 g/dL    Albumin 3.5 3.4 - 5.0 g/dL    Albumin/Globulin Ratio 1.0 (L) 1.1 - 2.2    Total Bilirubin 0.7 0.0 - 1.0 mg/dL    Alkaline Phosphatase 71 40 - 129 U/L    ALT 7 (L) 10 - 40 U/L    AST 23 15 - 37 U/L   Protime-INR   Result Value Ref Range    Protime 23.3 (H) 11.5 -

## (undated) DEVICE — GAUZE,SPONGE,4"X4",8PLY,STRL,LF,10/TRAY: Brand: MEDLINE

## (undated) DEVICE — CANNULA NSL 13FT TUBE AD ETCO2 DIV SAMP M

## (undated) DEVICE — 1010 S-DRAPE TOWEL DRAPE 10/BX: Brand: STERI-DRAPE™

## (undated) DEVICE — GLOVE ORANGE PI 8   MSG9080

## (undated) DEVICE — SOLUTION IV IRRIG WATER 1000ML POUR BRL 2F7114

## (undated) DEVICE — NEEDLE HYPO 25GA L1.5IN BLU POLYPR HUB S STL REG BVL STR

## (undated) DEVICE — SURGICAL PROCEDURE PACK EYE CLERMONT

## (undated) DEVICE — PREP SOL PVP IODINE 4%  4 OZ/BTL

## (undated) DEVICE — Device

## (undated) DEVICE — 6 ML SYRINGE LUER-LOCK TIP: Brand: MONOJECT